# Patient Record
Sex: FEMALE | Race: WHITE | Employment: PART TIME | URBAN - METROPOLITAN AREA
[De-identification: names, ages, dates, MRNs, and addresses within clinical notes are randomized per-mention and may not be internally consistent; named-entity substitution may affect disease eponyms.]

---

## 2017-01-29 ENCOUNTER — APPOINTMENT (OUTPATIENT)
Dept: GENERAL RADIOLOGY | Age: 62
End: 2017-01-29
Attending: EMERGENCY MEDICINE
Payer: MEDICAID

## 2017-01-29 ENCOUNTER — HOSPITAL ENCOUNTER (EMERGENCY)
Age: 62
Discharge: HOME | End: 2017-01-29
Attending: EMERGENCY MEDICINE
Payer: MEDICAID

## 2017-01-29 VITALS
HEART RATE: 77 BPM | TEMPERATURE: 97.8 F | OXYGEN SATURATION: 96 % | BODY MASS INDEX: 19.37 KG/M2 | WEIGHT: 120 LBS | DIASTOLIC BLOOD PRESSURE: 65 MMHG | RESPIRATION RATE: 13 BRPM | SYSTOLIC BLOOD PRESSURE: 124 MMHG

## 2017-01-29 DIAGNOSIS — T78.40XA ALLERGIC REACTION, INITIAL ENCOUNTER: Primary | ICD-10-CM

## 2017-01-29 DIAGNOSIS — R07.9 CHEST PAIN, UNSPECIFIED TYPE: ICD-10-CM

## 2017-01-29 LAB
ANION GAP BLD CALC-SCNC: 9 MMOL/L (ref 3–18)
BASOPHILS # BLD AUTO: 0 K/UL (ref 0–0.06)
BASOPHILS # BLD: 0 % (ref 0–2)
BUN SERPL-MCNC: 20 MG/DL (ref 7–18)
BUN/CREAT SERPL: 20 (ref 12–20)
CALCIUM SERPL-MCNC: 9.2 MG/DL (ref 8.5–10.1)
CHLORIDE SERPL-SCNC: 102 MMOL/L (ref 100–108)
CK MB CFR SERPL CALC: 1.3 % (ref 0–4)
CK MB CFR SERPL CALC: 1.3 % (ref 0–4)
CK MB SERPL-MCNC: 0.7 NG/ML (ref 0.5–3.6)
CK MB SERPL-MCNC: 0.9 NG/ML (ref 0.5–3.6)
CK SERPL-CCNC: 55 U/L (ref 26–192)
CK SERPL-CCNC: 71 U/L (ref 26–192)
CO2 SERPL-SCNC: 26 MMOL/L (ref 21–32)
CREAT SERPL-MCNC: 1.01 MG/DL (ref 0.6–1.3)
DIFFERENTIAL METHOD BLD: ABNORMAL
EOSINOPHIL # BLD: 0.1 K/UL (ref 0–0.4)
EOSINOPHIL NFR BLD: 2 % (ref 0–5)
ERYTHROCYTE [DISTWIDTH] IN BLOOD BY AUTOMATED COUNT: 13.2 % (ref 11.6–14.5)
GLUCOSE SERPL-MCNC: 181 MG/DL (ref 74–99)
HCT VFR BLD AUTO: 46.5 % (ref 35–45)
HGB BLD-MCNC: 15.5 G/DL (ref 12–16)
LYMPHOCYTES # BLD AUTO: 42 % (ref 21–52)
LYMPHOCYTES # BLD: 2.9 K/UL (ref 0.9–3.6)
MCH RBC QN AUTO: 30.5 PG (ref 24–34)
MCHC RBC AUTO-ENTMCNC: 33.3 G/DL (ref 31–37)
MCV RBC AUTO: 91.4 FL (ref 74–97)
MONOCYTES # BLD: 0.4 K/UL (ref 0.05–1.2)
MONOCYTES NFR BLD AUTO: 5 % (ref 3–10)
NEUTS SEG # BLD: 3.6 K/UL (ref 1.8–8)
NEUTS SEG NFR BLD AUTO: 51 % (ref 40–73)
PLATELET # BLD AUTO: 306 K/UL (ref 135–420)
PMV BLD AUTO: 11.2 FL (ref 9.2–11.8)
POTASSIUM SERPL-SCNC: 3.6 MMOL/L (ref 3.5–5.5)
RBC # BLD AUTO: 5.09 M/UL (ref 4.2–5.3)
SODIUM SERPL-SCNC: 137 MMOL/L (ref 136–145)
TROPONIN I SERPL-MCNC: 0.02 NG/ML (ref 0–0.06)
TROPONIN I SERPL-MCNC: <0.02 NG/ML (ref 0–0.06)
WBC # BLD AUTO: 7 K/UL (ref 4.6–13.2)

## 2017-01-29 PROCEDURE — 74011000250 HC RX REV CODE- 250: Performed by: EMERGENCY MEDICINE

## 2017-01-29 PROCEDURE — 99285 EMERGENCY DEPT VISIT HI MDM: CPT

## 2017-01-29 PROCEDURE — 80048 BASIC METABOLIC PNL TOTAL CA: CPT | Performed by: EMERGENCY MEDICINE

## 2017-01-29 PROCEDURE — 74011250636 HC RX REV CODE- 250/636: Performed by: EMERGENCY MEDICINE

## 2017-01-29 PROCEDURE — 94762 N-INVAS EAR/PLS OXIMTRY CONT: CPT

## 2017-01-29 PROCEDURE — 96374 THER/PROPH/DIAG INJ IV PUSH: CPT

## 2017-01-29 PROCEDURE — 85025 COMPLETE CBC W/AUTO DIFF WBC: CPT | Performed by: EMERGENCY MEDICINE

## 2017-01-29 PROCEDURE — 82550 ASSAY OF CK (CPK): CPT | Performed by: EMERGENCY MEDICINE

## 2017-01-29 PROCEDURE — 96361 HYDRATE IV INFUSION ADD-ON: CPT

## 2017-01-29 PROCEDURE — 96375 TX/PRO/DX INJ NEW DRUG ADDON: CPT

## 2017-01-29 PROCEDURE — 93005 ELECTROCARDIOGRAM TRACING: CPT

## 2017-01-29 PROCEDURE — 71010 XR CHEST PORT: CPT

## 2017-01-29 RX ORDER — FAMOTIDINE 20 MG/1
20 TABLET, FILM COATED ORAL DAILY
Qty: 10 TAB | Refills: 0 | Status: SHIPPED | OUTPATIENT
Start: 2017-01-29 | End: 2017-02-08

## 2017-01-29 RX ORDER — ONDANSETRON 4 MG/1
4 TABLET, ORALLY DISINTEGRATING ORAL
Qty: 14 TAB | Refills: 0 | Status: SHIPPED | OUTPATIENT
Start: 2017-01-29 | End: 2017-03-09

## 2017-01-29 RX ORDER — ONDANSETRON 2 MG/ML
4 INJECTION INTRAMUSCULAR; INTRAVENOUS
Status: COMPLETED | OUTPATIENT
Start: 2017-01-29 | End: 2017-01-29

## 2017-01-29 RX ORDER — FAMOTIDINE 10 MG/ML
20 INJECTION INTRAVENOUS
Status: COMPLETED | OUTPATIENT
Start: 2017-01-29 | End: 2017-01-29

## 2017-01-29 RX ORDER — PREDNISONE 20 MG/1
60 TABLET ORAL DAILY
Qty: 12 TAB | Refills: 0 | Status: SHIPPED | OUTPATIENT
Start: 2017-01-29 | End: 2017-02-02

## 2017-01-29 RX ORDER — SODIUM CHLORIDE 9 MG/ML
1000 INJECTION, SOLUTION INTRAVENOUS ONCE
Status: COMPLETED | OUTPATIENT
Start: 2017-01-29 | End: 2017-01-29

## 2017-01-29 RX ADMIN — SODIUM CHLORIDE 1000 ML: 900 INJECTION, SOLUTION INTRAVENOUS at 19:53

## 2017-01-29 RX ADMIN — FAMOTIDINE 20 MG: 10 INJECTION, SOLUTION INTRAVENOUS at 19:53

## 2017-01-29 RX ADMIN — METHYLPREDNISOLONE SODIUM SUCCINATE 125 MG: 125 INJECTION, POWDER, FOR SOLUTION INTRAMUSCULAR; INTRAVENOUS at 19:54

## 2017-01-29 RX ADMIN — ONDANSETRON 4 MG: 2 INJECTION INTRAMUSCULAR; INTRAVENOUS at 19:54

## 2017-01-30 LAB
ATRIAL RATE: 73 BPM
CALCULATED P AXIS, ECG09: 60 DEGREES
CALCULATED R AXIS, ECG10: 80 DEGREES
CALCULATED T AXIS, ECG11: 63 DEGREES
DIAGNOSIS, 93000: NORMAL
P-R INTERVAL, ECG05: 142 MS
Q-T INTERVAL, ECG07: 394 MS
QRS DURATION, ECG06: 80 MS
QTC CALCULATION (BEZET), ECG08: 434 MS
VENTRICULAR RATE, ECG03: 73 BPM

## 2017-01-30 NOTE — DISCHARGE INSTRUCTIONS
Return for pain, any fever, any difficulty swallowing, shortness of breath, vomiting, decreased fluid intake, weakness, numbness, dizziness, or any change or concerns. Take benadryl over the counter as directed for the next 24 hours as we discussed, as needed afterwards. Allergic Reaction: Care Instructions  Your Care Instructions  An allergic reaction is an excessive response from your immune system to a medicine, chemical, food, insect bite, or other substance. A reaction can range from mild to life-threatening. Some people have a mild rash, hives, and itching or stomach cramps. In severe reactions, swelling of your tongue and throat can close up your airway so that you cannot breathe. Follow-up care is a key part of your treatment and safety. Be sure to make and go to all appointments, and call your doctor if you are having problems. It's also a good idea to know your test results and keep a list of the medicines you take. How can you care for yourself at home? · If you know what caused your allergic reaction, be sure to avoid it. Your allergy may become more severe each time you have a reaction. · Take an over-the-counter antihistamine, such as cetirizine (Zyrtec) or loratadine (Claritin), to treat mild symptoms. Read and follow directions on the label. Some antihistamines can make you feel sleepy. Do not give antihistamines to a child unless you have checked with your doctor first. Mild symptoms include sneezing or an itchy or runny nose; an itchy mouth; a few hives or mild itching; and mild nausea or stomach discomfort. · Do not scratch hives or a rash. Put a cold, moist towel on them or take cool baths to relieve itching. Put ice packs on hives, swelling, or insect stings for 10 to 15 minutes at a time. Put a thin cloth between the ice pack and your skin. Do not take hot baths or showers. They will make the itching worse.   · Your doctor may prescribe a shot of epinephrine to carry with you in case you have a severe reaction. Learn how to give yourself the shot and keep it with you at all times. Make sure it is not . · Go to the emergency room every time you have a severe reaction, even if you have used your shot of epinephrine and are feeling better. Symptoms can come back after a shot. · Wear medical alert jewelry that lists your allergies. You can buy this at most drugstores. · If your child has a severe allergy, make sure that his or her teachers, babysitters, coaches, and other caregivers know about the allergy. They should have an epinephrine shot, know how and when to give it, and have a plan to take your child to the hospital.  When should you call for help? Give an epinephrine shot if:  · You think you are having a severe allergic reaction. · You have symptoms in more than one body area, such as mild nausea and an itchy mouth. After giving an epinephrine shot call 911, even if you feel better. Call 911 if:  · You have symptoms of a severe allergic reaction. These may include:  ¨ Sudden raised, red areas (hives) all over your body. ¨ Swelling of the throat, mouth, lips, or tongue. ¨ Trouble breathing. ¨ Passing out (losing consciousness). Or you may feel very lightheaded or suddenly feel weak, confused, or restless. · You have been given an epinephrine shot, even if you feel better. Call your doctor now or seek immediate medical care if:  · You have symptoms of an allergic reaction, such as:  ¨ A rash or hives (raised, red areas on the skin). ¨ Itching. ¨ Swelling. ¨ Belly pain, nausea, or vomiting. Watch closely for changes in your health, and be sure to contact your doctor if:  · You do not get better as expected. Where can you learn more? Go to http://carolyne-fabiana.info/. Enter W675 in the search box to learn more about \"Allergic Reaction: Care Instructions. \"  Current as of: 2016  Content Version: 11.1  © 6905-5262 Healthwise, Incorporated. Care instructions adapted under license by [employer] (which disclaims liability or warranty for this information). If you have questions about a medical condition or this instruction, always ask your healthcare professional. [address] any warranty or liability for your use of this information. Chest Pain: Care Instructions  Your Care Instructions  There are many things that can cause chest pain. Some are not serious and will get better on their own in a few days. But some kinds of chest pain need more testing and treatment. Your doctor may have recommended a follow-up visit in the next 8 to 12 hours. If you are not getting better, you may need more tests or treatment. Even though your doctor has released you, you still need to watch for any problems. The doctor carefully checked you, but sometimes problems can develop later. If you have new symptoms or if your symptoms do not get better, get medical care right away. If you have worse or different chest pain or pressure that lasts more than 5 minutes or you passed out (lost consciousness), call 911 or seek other emergency help right away. A medical visit is only one step in your treatment. Even if you feel better, you still need to do what your doctor recommends, such as going to all suggested follow-up appointments and taking medicines exactly as directed. This will help you recover and help prevent future problems. How can you care for yourself at home? · Rest until you feel better. · Take your medicine exactly as prescribed. Call your doctor if you think you are having a problem with your medicine. · Do not drive after taking a prescription pain medicine. When should you call for help? Call 911 if:  · You passed out (lost consciousness). · You have severe difficulty breathing. · You have symptoms of a heart attack.  These may include:  ¨ Chest pain or pressure, or a strange feeling in your chest.  ¨ Sweating. ¨ Shortness of breath. ¨ Nausea or vomiting. ¨ Pain, pressure, or a strange feeling in your back, neck, jaw, or upper belly or in one or both shoulders or arms. ¨ Lightheadedness or sudden weakness. ¨ A fast or irregular heartbeat. After you call 911, the  may tell you to chew 1 adult-strength or 2 to 4 low-dose aspirin. Wait for an ambulance. Do not try to drive yourself. Call your doctor today if:  · You have any trouble breathing. · Your chest pain gets worse. · You are dizzy or lightheaded, or you feel like you may faint. · You are not getting better as expected. · You are having new or different chest pain. Where can you learn more? Go to http://carolyne-fabiana.info/. Enter A120 in the search box to learn more about \"Chest Pain: Care Instructions. \"  Current as of: May 27, 2016  Content Version: 11.1  © 9958-8865 Healthwise, Incorporated. Care instructions adapted under license by [employer] (which disclaims liability or warranty for this information). If you have questions about a medical condition or this instruction, always ask your healthcare professional. [place] [address] any warranty or liability for your use of this information.

## 2017-01-30 NOTE — ED NOTES
Patient resting well. No complaints at this time. Patient provided with additional warm blanket for comfort. Will continue to monitor.

## 2017-01-30 NOTE — ED PROVIDER NOTES
HPI Comments: 7:40 PM Caitlin Shen is a 64 y.o. female with a h/o HTN, and alcoholism presents to the ED with c/o allergic reaction onset this evening. Pt states she has taken 2 Benadryl tablets PTA. Pt reports hives, mild increasing chest tightness, itching to her arms, abd, and feet. Pt denies any new medication, trouble swallowing, SOB, abd pain, nausea, vomiting, diarrhea, chest pain, or cough. Pt noted that she takes Lisinopril. All other sx denied. No other complaints at this time. Deane Gottron, MD      Patient is a 64 y.o. female presenting with allergic reaction. The history is provided by the patient. Allergic Reaction    Pertinent negatives include no nausea, no vomiting and no shortness of breath. Past Medical History:   Diagnosis Date    Depressive disorder, not elsewhere classified     Esophagitis, unspecified     Essential hypertension, benign     Generalized osteoarthrosis, involving multiple sites     Mixed hyperlipidemia     Personal history of alcoholism ([facility] [address]        Past Surgical History:   Procedure Laterality Date    Hx cholecystectomy      Hx tubal ligation Bilateral     Hx tonsil and adenoidectomy      Hx colonoscopy  9/2010     (Jaun Lesches) diverticuli         Family History:   Problem Relation Age of Onset    Arthritis-osteo Mother     Ulcerative Colitis Mother     Breast Cancer Mother     Heart Disease Father        Social History     Social History    Marital status:      Spouse name: N/A    Number of children: N/A    Years of education: N/A     Occupational History    Not on file. Social History Main Topics    Smoking status: Current Every Day Smoker    Smokeless tobacco: Not on file    Alcohol use No    Drug use: No    Sexual activity: Not on file     Other Topics Concern    Not on file     Social History Narrative         ALLERGIES: Celebrex [celecoxib]    Review of Systems   Constitutional: Negative for fever.    HENT: Negative for congestion and trouble swallowing. Respiratory: Positive for chest tightness. Negative for cough and shortness of breath. Cardiovascular: Negative for chest pain. Gastrointestinal: Negative for abdominal pain, diarrhea, nausea and vomiting. Musculoskeletal: Negative for back pain. Skin: Positive for rash. Neurological: Negative for light-headedness. All other systems reviewed and are negative. Vitals:    01/29/17 2145 01/29/17 2200 01/29/17 2245 01/29/17 2255   BP: 121/59 114/62 124/65    Pulse: 81 84 79 77   Resp: 17 17 19 13   Temp:       SpO2: 93% 93% 93% 96%   Weight:                Physical Exam   Constitutional: She is oriented to person, place, and time. She appears well-developed. HENT:   Head: Normocephalic. Mouth/Throat: Oropharynx is clear and moist.   Eyes: Pupils are equal, round, and reactive to light. Neck: Normal range of motion. Cardiovascular: Normal rate and normal heart sounds. No murmur heard. Pulmonary/Chest: Effort normal. She has no wheezes. She has no rales. Abdominal: Soft. There is no tenderness. Musculoskeletal: Normal range of motion. She exhibits no edema. Neurological: She is alert and oriented to person, place, and time. Skin: Skin is warm and dry. Rash noted. Blanching erythematous rash scattered to torso and extremities. No petechiae, no warmth, and no induration. Nursing note and vitals reviewed. [facility]  ED Course       Procedures  Vitals:  No data found.         Medications ordered:   Medications   0.9% sodium chloride infusion 1,000 mL (0 mL IntraVENous IV Completed 1/29/17 2113)   ondansetron (ZOFRAN) injection 4 mg (4 mg IntraVENous Given 1/29/17 1954)   methylPREDNISolone (PF) (SOLU-MEDROL) injection 125 mg (125 mg IntraVENous Given 1/29/17 1954)   famotidine (PF) (PEPCID) injection 20 mg (20 mg IntraVENous Given 1/29/17 1953)         Lab findings:  No results found for this or any previous visit (from the past 15 hour(s)). X-Ray, CT or other radiology findings or impressions:  XR CHEST PORT   Final Result          Progress notes, Consult notes or additional Procedure notes:   8:05 PM  Pt is feeling better. Decreased rash on the chin.     8:52 PM  Sx's resolved. Pt feels much better. No emc, stable for dc and close f/u. Det ret inst given. Disposition:  Diagnosis:   1. Allergic reaction, initial encounter    2. Chest pain, unspecified type        Disposition: discharge    Follow-up Information     Follow up With Details Comments Kaushik French MD Schedule an appointment as soon as possible for a visit in 1 day  [address] [place] [address] [facility] ([place], [place]) [phone]             Discharge Medication List as of 1/29/2017 11:18 PM      START taking these medications    Details   ondansetron (ZOFRAN ODT) 4 mg disintegrating tablet Take 1 Tab by mouth every eight (8) hours as needed for Nausea. , Print, Disp-14 Tab, R-0      famotidine (PEPCID) 20 mg tablet Take 1 Tab by mouth daily for 10 days. , Print, Disp-10 Tab, R-0      predniSONE (DELTASONE) 20 mg tablet Take 3 Tabs by mouth daily for 4 days. , Print, Disp-12 Tab, R-0         CONTINUE these medications which have NOT CHANGED    Details   DIPHENHYDRAMINE HCL (BENADRYL PO) Take  by mouth., Historical Med      IBUPROFEN (MOTRIN PO) Take  by mouth., Historical Med      lisinopril-hydrochlorothiazide (PRINZIDE, ZESTORETIC) 20-12.5 mg per tablet 1 qd, Normal, Disp-90 Tab, R-3      Omeprazole delayed release (PRILOSEC D/R) 20 mg tablet Take 20 mg by mouth daily as needed., Historical Med      traMADol (ULTRAM) 50 mg tablet 1 or 2 po tid prn pain, Print, Disp-30 Tab, R-1           Scribe Attestation  Jan Awad scribing for and in the presence of Aissatou Guardado MD 7:39 PM, 02/01/17.     Physician Attestation  I personally performed the services described in the documentation, reviewed the documentation, as recorded by the scribe in my presence, and it accurately and completely records my words and actions.     Bhanu Hu MD 7:39 PM 02/01/17        Signed by : Sheree Marsh, 02/01/17 at 7:39 PM

## 2017-01-30 NOTE — ED NOTES
Patient c/o itching and shaking PTA. Patient reports that she has not started any new medication, and does not know of anything she was exposed to that she could have had an allergic reaction to. Patient reports that she took a benadryl PTA without relief. Patient understands plan of care. Will continue to monitor.

## 2017-01-30 NOTE — ED NOTES
Patient states that itching and shaking has resolved. Patient reports that she \"feels much better\". Patient understands plan of care. Will continue to monitor.

## 2017-03-09 ENCOUNTER — OFFICE VISIT (OUTPATIENT)
Dept: INTERNAL MEDICINE CLINIC | Age: 62
End: 2017-03-09

## 2017-03-09 VITALS
BODY MASS INDEX: 20.25 KG/M2 | WEIGHT: 126 LBS | RESPIRATION RATE: 16 BRPM | DIASTOLIC BLOOD PRESSURE: 80 MMHG | HEART RATE: 71 BPM | SYSTOLIC BLOOD PRESSURE: 130 MMHG | HEIGHT: 66 IN | OXYGEN SATURATION: 99 % | TEMPERATURE: 98 F

## 2017-03-09 DIAGNOSIS — I10 ESSENTIAL HYPERTENSION, BENIGN: Primary | ICD-10-CM

## 2017-03-09 DIAGNOSIS — E78.2 MIXED HYPERLIPIDEMIA: ICD-10-CM

## 2017-03-09 RX ORDER — LISINOPRIL AND HYDROCHLOROTHIAZIDE 12.5; 2 MG/1; MG/1
TABLET ORAL
Qty: 90 TAB | Refills: 3 | Status: SHIPPED | OUTPATIENT
Start: 2017-03-09 | End: 2019-03-26 | Stop reason: SDUPTHER

## 2017-03-09 NOTE — MR AVS SNAPSHOT
Visit Information Date & Time Provider Department Dept. Phone Encounter #  
 3/9/2017  9:00 AM Maria Victoria Coles MD [facility] [place] [phone] [id number] Follow-up Instructions Return if symptoms worsen or fail to improve. Follow-up and Disposition History Your Appointments 9/8/2017  9:00 AM  
Follow Up with Maria Victoria Coles MD  
[facility] [place] [facility] Appt Note: 6 mo f/u  
 [address], [address]  
  
   
 [address], [address] [place] [address] Upcoming Health Maintenance Date Due Hepatitis C Screening 1955 Pneumococcal 19-64 Medium Risk (1 of 1 - PPSV23) 4/15/1974 FOBT Q 1 YEAR AGE 50-75 4/15/2005 DTaP/Tdap/Td series (1 - Tdap) 8/16/2006 PAP AKA CERVICAL CYTOLOGY 8/26/2014 ZOSTER VACCINE AGE 60> 4/15/2015 BREAST CANCER SCRN MAMMOGRAM 12/29/2018 Allergies as of 3/9/2017  Review Complete On: 3/9/2017 By: Maria Victoria Coles MD  
  
 Severity Noted Reaction Type Reactions Celebrex [Celecoxib]    Rash Current Immunizations  Never Reviewed Name Date Influenza Vaccine (Quad) PF 9/9/2016 Td 8/15/2006 Not reviewed this visit You Were Diagnosed With   
  
 Codes Comments Essential hypertension, benign    -  Primary ICD-10-CM: I10 
ICD-9-CM: 401.1 Mixed hyperlipidemia     ICD-10-CM: E78.2 ICD-9-CM: 272.2 Vitals BP Pulse Temp Resp Height(growth percentile) Weight(growth percentile) 130/80 (BP 1 Location: Left arm, BP Patient Position: Sitting) 71 98 °F (36.7 °C) (Tympanic) 16 5' 6\" (1.676 m) 126 lb (57.2 kg) SpO2 BMI OB Status Smoking Status 99% 20.34 kg/m2 Postmenopausal Former Smoker Vitals History BMI and BSA Data Body Mass Index Body Surface Area  
 20.34 kg/m 2 1.63 m 2 Preferred Pharmacy Pharmacy Name Phone [facility] [address] [place], [place] [address] Your Updated Medication List  
  
   
This list is accurate as of: 3/9/17  9:21 AM.  Always use your most recent med list.  
  
  
  
  
 lisinopril-hydroCHLOROthiazide 20-12.5 mg per tablet Commonly known as:  PRINZIDE, ZESTORETIC  
1 qd MOTRIN PO Take  by mouth. Omeprazole delayed release 20 mg tablet Commonly known as:  PRILOSEC D/R Take 20 mg by mouth daily as needed. traMADol 50 mg tablet Commonly known as:  ULTRAM  
1 or 2 po tid prn pain Prescriptions Sent to Pharmacy Refills  
 lisinopril-hydroCHLOROthiazide (PRINZIDE, ZESTORETIC) 20-12.5 mg per tablet 3 Si qd Class: Normal  
 Pharmacy: [id number] [place] [address], [address] [place] [address] #: [phone] Follow-up Instructions Return if symptoms worsen or fail to improve. Patient Instructions High Cholesterol: Care Instructions Your Care Instructions Cholesterol is a type of fat in your blood. It is needed for many body functions, such as making new cells. Cholesterol is made by your body. It also comes from food you eat. High cholesterol means that you have too much of the fat in your blood. This raises your risk of a heart attack and stroke. LDL and HDL are part of your total cholesterol. LDL is the \"bad\" cholesterol. High LDL can raise your risk for heart disease, heart attack, and stroke. HDL is the \"good\" cholesterol. It helps clear bad cholesterol from the body. High HDL is linked with a lower risk of heart disease, heart attack, and stroke. Your cholesterol levels help your doctor find out your risk for having a heart attack or stroke. You and your doctor can talk about whether you need to lower your risk and what treatment is best for you.  
A heart-healthy lifestyle along with medicines can help lower your cholesterol and your risk. The way you choose to lower your risk will depend on how high your risk is for heart attack and stroke. It will also depend on how you feel about taking medicines. Follow-up care is a key part of your treatment and safety. Be sure to make and go to all appointments, and call your doctor if you are having problems. It's also a good idea to know your test results and keep a list of the medicines you take. How can you care for yourself at home? · Eat a variety of foods every day. Good choices include fruits, vegetables, whole grains (like oatmeal), dried beans and peas, nuts and seeds, soy products (like tofu), and fat-free or low-fat dairy products. · Replace butter, margarine, and hydrogenated or partially hydrogenated oils with olive and canola oils. (Canola oil margarine without trans fat is fine.) · Replace red meat with fish, poultry, and soy protein (like tofu). · Limit processed and packaged foods like chips, crackers, and cookies. · Bake, broil, or steam foods. Don't gil them. · Be physically active. Get at least 30 minutes of exercise on most days of the week. Walking is a good choice. You also may want to do other activities, such as running, swimming, cycling, or playing tennis or team sports. · Stay at a healthy weight or lose weight by making the changes in eating and physical activity listed above. Losing just a small amount of weight, even 5 to 10 pounds, can reduce your risk for having a heart attack or stroke. · Do not smoke. When should you call for help? Watch closely for changes in your health, and be sure to contact your doctor if: 
· You need help making lifestyle changes. · You have questions about your medicine. Where can you learn more? Go to http://carolyne-fabiana.info/. Enter C241 in the search box to learn more about \"High Cholesterol: Care Instructions. \" Current as of: January 27, 2016 Content Version: 11.1 © 6279-6655 Healthwise, Incorporated. Care instructions adapted under license by [employer] (which disclaims liability or warranty for this information). If you have questions about a medical condition or this instruction, always ask your healthcare professional. [address] any warranty or liability for your use of this information. Introducing [facility]! Manjula Conde introduces [employer] patient portal. Now you can access parts of your medical record, email your doctor's office, and request medication refills online. 1. In your internet browser, go to [email or web address] 2. Click on the First Time User? Click Here link in the Sign In box. You will see the New Member Sign Up page. 3. Enter your [employer] Access Code exactly as it appears below. You will not need to use this code after youve completed the sign-up process. If you do not sign up before the expiration date, you must request a new code. · [employer] Access Code: [email or web address] Expires: 3/19/2017  3:53 PM 
 
4. Enter the last four digits of your Social Security Number (xxxx) and Date of Birth (mm/dd/yyyy) as indicated and click Submit. You will be taken to the next sign-up page. 5. Create a [employer] ID. This will be your [employer] login ID and cannot be changed, so think of one that is secure and easy to remember. 6. Create a [employer] password. You can change your password at any time. 7. Enter your Password Reset Question and Answer. This can be used at a later time if you forget your password. 8. Enter your e-mail address. You will receive e-mail notification when new information is available in [address] 9. Click Sign Up. You can now view and download portions of your medical record. 10. Click the Download Summary menu link to download a portable copy of your medical information.  
 
If you have questions, please visit the Frequently Asked Questions section of the [employer]. Remember, [employer] is NOT to be used for urgent needs. For medical emergencies, dial 911. Now available from your iPhone and Android! Please provide this summary of care documentation to your next provider. Your primary care clinician is listed as Paulette Arias. If you have any questions after today's visit, please call [phone].

## 2017-03-09 NOTE — PROGRESS NOTES
HPI:   Routine f/u of HTN, hyperlipidemia (chol 248 in Sept 2016)  ER visit 1/2017 for hives; resolved in 24 hrs with benadryl (no obvious inciting cause)  w/o chest pain/abd. discomfort; no dyspnea, cough or pedal edema; denies constitutional complaints of fever, night sweats or wt loss; no evidence of GI/ hemorrhage; no polyuria/polydipsia. Activity is age appropriate. ROS is otherwise negative. Past Medical History:   Diagnosis Date    Depressive disorder, not elsewhere classified     Esophagitis, unspecified     Essential hypertension, benign     Generalized osteoarthrosis, involving multiple sites     Mixed hyperlipidemia     Personal history of alcoholism ([facility] [address]        Past Surgical History:   Procedure Laterality Date    HX CHOLECYSTECTOMY      HX COLONOSCOPY  9/2010    (Ariane Kumar) diverticuli    HX TONSIL AND ADENOIDECTOMY      HX TUBAL LIGATION Bilateral        Social History     Social History    Marital status:      Spouse name: N/A    Number of children: N/A    Years of education: N/A     Occupational History    Not on file. Social History Main Topics    Smoking status: Current Every Day Smoker    Smokeless tobacco: Not on file    Alcohol use No    Drug use: No    Sexual activity: Not on file     Other Topics Concern    Not on file     Social History Narrative       Allergies   Allergen Reactions    Celebrex [Celecoxib] Rash       Family History   Problem Relation Age of Onset    Arthritis-osteo Mother     Ulcerative Colitis Mother     Breast Cancer Mother     Heart Disease Father        Current Outpatient Prescriptions   Medication Sig Dispense Refill    IBUPROFEN (MOTRIN PO) Take  by mouth.  lisinopril-hydrochlorothiazide (PRINZIDE, ZESTORETIC) 20-12.5 mg per tablet 1 qd 90 Tab 3    Omeprazole delayed release (PRILOSEC D/R) 20 mg tablet Take 20 mg by mouth daily as needed.       traMADol (ULTRAM) 50 mg tablet 1 or 2 po tid prn pain 30 Tab 1 Visit Vitals    /80 (BP 1 Location: Left arm, BP Patient Position: Sitting)    Pulse 71    Temp 98 °F (36.7 °C) (Tympanic)    Resp 16    Ht 5' 6\" (1.676 m)    Wt 126 lb (57.2 kg)    SpO2 99%    BMI 20.34 kg/m2       PE  Well nourished in NAD  HEENT: PERRLA, EOMI;  OP: clear. Neck: supple w/o mass or bruits. Chest: clear. CV: RRR w/o m,r,g; pulses intact. Abd: soft, NT, w/o HSM or mass. Ext: w/o edema. Neuro: NF. Assessment and Plan    Encounter Diagnoses   Name Primary?     Essential hypertension, benign Yes    Mixed hyperlipidemia    HTN - controlled  Hyperlipidemia - continue dietary/exercise efforts  No change in rx  OV 6 mos or prn  I have explained plan to patient and the patient verbalizes understanding

## 2017-03-09 NOTE — PROGRESS NOTES
1. Have you been to the ER, urgent care clinic since your last visit? Hospitalized since your last visit? No    2. Have you seen or consulted any other health care providers outside of the [address] [place] [address] since your last visit? Include any pap smears or colon screening.  No

## 2017-03-09 NOTE — PATIENT INSTRUCTIONS

## 2017-03-17 ENCOUNTER — TELEPHONE (OUTPATIENT)
Dept: INTERNAL MEDICINE CLINIC | Age: 62
End: 2017-03-17

## 2017-03-20 NOTE — TELEPHONE ENCOUNTER
Returned call to patient and notified her that a bone scan is not recommended until age 72.  Patient voices understanding

## 2017-09-18 ENCOUNTER — OFFICE VISIT (OUTPATIENT)
Dept: INTERNAL MEDICINE CLINIC | Age: 62
End: 2017-09-18

## 2017-09-18 VITALS
WEIGHT: 125 LBS | BODY MASS INDEX: 24.54 KG/M2 | TEMPERATURE: 97.8 F | SYSTOLIC BLOOD PRESSURE: 124 MMHG | RESPIRATION RATE: 16 BRPM | HEIGHT: 60 IN | OXYGEN SATURATION: 96 % | HEART RATE: 75 BPM | DIASTOLIC BLOOD PRESSURE: 78 MMHG

## 2017-09-18 DIAGNOSIS — E78.2 MIXED HYPERLIPIDEMIA: ICD-10-CM

## 2017-09-18 DIAGNOSIS — Z23 ENCOUNTER FOR IMMUNIZATION: ICD-10-CM

## 2017-09-18 DIAGNOSIS — Z00.00 ROUTINE GENERAL MEDICAL EXAMINATION AT A HEALTH CARE FACILITY: Primary | ICD-10-CM

## 2017-09-18 DIAGNOSIS — I10 ESSENTIAL HYPERTENSION, BENIGN: ICD-10-CM

## 2017-09-18 NOTE — PROGRESS NOTES
HPI:   Routine check up  HX is notable for HTN, hyperlipidemia  w/o chest pain/abd. discomfort; no dyspnea, cough or pedal edema; denies constitutional complaints of fever, night sweats or wt loss; no evidence of GI/ hemorrhage; no polyuria/polydipsia. Activity is age appropriate. ROS is otherwise negative. Past Medical History:   Diagnosis Date    Depressive disorder, not elsewhere classified     Esophagitis, unspecified     Essential hypertension, benign     Generalized osteoarthrosis, involving multiple sites     Mixed hyperlipidemia     Personal history of alcoholism ([facility] [address]        Past Surgical History:   Procedure Laterality Date    HX CHOLECYSTECTOMY      HX COLONOSCOPY  9/2010    (Jesus Almonte) diverticuli    HX TONSIL AND ADENOIDECTOMY      HX TUBAL LIGATION Bilateral        Social History     Social History    Marital status:      Spouse name: N/A    Number of children: N/A    Years of education: N/A     Occupational History    Not on file. Social History Main Topics    Smoking status: Former Smoker    Smokeless tobacco: Former User     Quit date: 1/9/2016    Alcohol use No    Drug use: No    Sexual activity: Not on file     Other Topics Concern    Not on file     Social History Narrative       Allergies   Allergen Reactions    Celebrex [Celecoxib] Rash       Family History   Problem Relation Age of Onset    Arthritis-osteo Mother     Ulcerative Colitis Mother     Breast Cancer Mother     Heart Disease Father        Current Outpatient Prescriptions   Medication Sig Dispense Refill    lisinopril-hydroCHLOROthiazide (PRINZIDE, ZESTORETIC) 20-12.5 mg per tablet 1 qd 90 Tab 3    IBUPROFEN (MOTRIN PO) Take  by mouth.  Omeprazole delayed release (PRILOSEC D/R) 20 mg tablet Take 20 mg by mouth daily as needed.       traMADol (ULTRAM) 50 mg tablet 1 or 2 po tid prn pain 30 Tab 1           Visit Vitals    /78 (BP 1 Location: Right arm, BP Patient Position: Sitting)    Pulse 75    Temp 97.8 °F (36.6 °C) (Tympanic)    Resp 16    Ht 5' (1.524 m)    Wt 125 lb (56.7 kg)    SpO2 96%    BMI 24.41 kg/m2       PE  Well nourished in NAD  HEENT:  OP: clear. Neck: supple w/o mass or bruits. Chest: clear. CV: RRR w/o m,r,g; pulses intact. Abd: soft, NT, w/o HSM or mass. Ext: w/o edema. Neuro: NF. Assessment and Plan    Encounter Diagnoses   Name Primary?     Routine general medical examination at a health care facility Yes    Essential hypertension, benign     Mixed hyperlipidemia    HTN - controlled  Hyperlipidemia - labs soon to assess  Continue dietary/exercise efforts  Flu/Tdap given  Mammogram neg 12/2016; colo 9/2010 (diverticula)  No change in rx  OV 6 mos or prn  I have explained plan to patient and the patient verbalizes understanding

## 2017-09-18 NOTE — PATIENT INSTRUCTIONS

## 2017-09-21 LAB
BUN SERPL-MCNC: 15 MG/DL (ref 8–27)
BUN/CREAT SERPL: 24 (ref 12–28)
CALCIUM SERPL-MCNC: 9.2 MG/DL (ref 8.7–10.3)
CHLORIDE SERPL-SCNC: 101 MMOL/L (ref 96–106)
CHOLEST SERPL-MCNC: 224 MG/DL (ref 100–199)
CO2 SERPL-SCNC: 23 MMOL/L (ref 18–29)
CREAT SERPL-MCNC: 0.62 MG/DL (ref 0.57–1)
GLUCOSE SERPL-MCNC: 101 MG/DL (ref 65–99)
HDLC SERPL-MCNC: 43 MG/DL
LDLC SERPL CALC-MCNC: 160 MG/DL (ref 0–99)
POTASSIUM SERPL-SCNC: 4.2 MMOL/L (ref 3.5–5.2)
SODIUM SERPL-SCNC: 143 MMOL/L (ref 134–144)
TRIGL SERPL-MCNC: 106 MG/DL (ref 0–149)
VLDLC SERPL CALC-MCNC: 21 MG/DL (ref 5–40)

## 2018-01-23 ENCOUNTER — OFFICE VISIT (OUTPATIENT)
Dept: INTERNAL MEDICINE CLINIC | Age: 63
End: 2018-01-23

## 2018-01-23 VITALS
TEMPERATURE: 97.8 F | HEIGHT: 60 IN | OXYGEN SATURATION: 97 % | WEIGHT: 123 LBS | RESPIRATION RATE: 16 BRPM | DIASTOLIC BLOOD PRESSURE: 78 MMHG | HEART RATE: 70 BPM | SYSTOLIC BLOOD PRESSURE: 120 MMHG | BODY MASS INDEX: 24.15 KG/M2

## 2018-01-23 DIAGNOSIS — J06.9 ACUTE URI: Primary | ICD-10-CM

## 2018-01-23 DIAGNOSIS — I10 ESSENTIAL HYPERTENSION, BENIGN: ICD-10-CM

## 2018-01-23 RX ORDER — AMOXICILLIN 500 MG/1
CAPSULE ORAL
Qty: 30 CAP | Refills: 0 | Status: SHIPPED | OUTPATIENT
Start: 2018-01-23 | End: 2018-11-08

## 2018-01-23 NOTE — PROGRESS NOTES
1. Have you been to the ER, urgent care clinic since your last visit? Hospitalized since your last visit? No    2. Have you seen or consulted any other health care providers outside of the [address] [place] [address] since your last visit? Include any pap smears or colon screening.  No

## 2018-01-23 NOTE — PATIENT INSTRUCTIONS

## 2018-01-23 NOTE — PROGRESS NOTES
HPI:   14 days of head congestion, facial discomfort, NP cough w/o fever, dyspnea, CP, or N  Hx is notable for HTN    ROS is otherwise negative. Past Medical History:   Diagnosis Date    Depressive disorder, not elsewhere classified     Esophagitis, unspecified     Essential hypertension, benign     Generalized osteoarthrosis, involving multiple sites     Mixed hyperlipidemia     Personal history of alcoholism ([facility] [address]        Past Surgical History:   Procedure Laterality Date    HX CHOLECYSTECTOMY      HX COLONOSCOPY  9/2010    (Laloamisha Rico) diverticuli    HX TONSIL AND ADENOIDECTOMY      HX TUBAL LIGATION Bilateral        Social History     Social History    Marital status:      Spouse name: N/A    Number of children: N/A    Years of education: N/A     Occupational History    Not on file. Social History Main Topics    Smoking status: Former Smoker    Smokeless tobacco: Former User     Quit date: 1/9/2016    Alcohol use No    Drug use: No    Sexual activity: Not on file     Other Topics Concern    Not on file     Social History Narrative       Allergies   Allergen Reactions    Celebrex [Celecoxib] Rash       Family History   Problem Relation Age of Onset    Arthritis-osteo Mother     Ulcerative Colitis Mother     Breast Cancer Mother     Heart Disease Father        Current Outpatient Prescriptions   Medication Sig Dispense Refill    lisinopril-hydroCHLOROthiazide (PRINZIDE, ZESTORETIC) 20-12.5 mg per tablet 1 qd 90 Tab 3    Omeprazole delayed release (PRILOSEC D/R) 20 mg tablet Take 20 mg by mouth daily as needed. Visit Vitals    /78 (BP 1 Location: Right arm, BP Patient Position: Sitting)    Pulse 70    Temp 97.8 °F (36.6 °C) (Tympanic)    Resp 16    Ht 5' (1.524 m)    Wt 123 lb (55.8 kg)    SpO2 97%    BMI 24.02 kg/m2       PE  Well nourished in NAD  HEENT:  OP: clear. TMS: clear  Neck: supple w/o mass or bruits. Chest: clear.   CV: RRR w/o m,r,g; pulses intact. Abd: soft, NT, w/o HSM or mass. Ext: w/o edema. Neuro: NF. Assessment and Plan    Encounter Diagnoses   Name Primary?     Acute URI Yes    Essential hypertension, benign    amoxicillin 500 mg tid for 10 days  Claritin/robitussin DM prn  F/U worsening or unimproved 7 days  HTN - controlled  OV 6 mos or prn  I have explained plan to patient and the patient verbalizes understanding

## 2018-02-15 ENCOUNTER — HOSPITAL ENCOUNTER (OUTPATIENT)
Dept: MAMMOGRAPHY | Age: 63
Discharge: HOME | End: 2018-02-15
Attending: INTERNAL MEDICINE
Payer: COMMERCIAL

## 2018-02-15 DIAGNOSIS — Z12.31 VISIT FOR SCREENING MAMMOGRAM: ICD-10-CM

## 2018-02-15 PROCEDURE — 77063 BREAST TOMOSYNTHESIS BI: CPT

## 2018-11-07 ENCOUNTER — DOCUMENTATION ONLY (OUTPATIENT)
Dept: INTERNAL MEDICINE CLINIC | Age: 63
End: 2018-11-07

## 2018-11-07 NOTE — PROGRESS NOTES
Pharmacy Note: Immunization Update    Patient: Kylie Crowley (2 y.o., 1955)     Patient's immunization history was updated to reflect information contained in the [place] and/or outside immunization/pharmacy records were reconciled within [facility]. Health Maintenance schedule updated when appropriate.     Current immunizations now reflect:       Immunization History   Administered Date(s) Administered    Influenza Vaccine (Quad) PF 09/09/2016, 09/18/2017    Td 08/15/2006    Tdap 09/18/2017    Zoster Vaccine, Live 10/02/2017       Annabella Covington, PharmD, BCACP

## 2018-11-08 ENCOUNTER — OFFICE VISIT (OUTPATIENT)
Dept: INTERNAL MEDICINE CLINIC | Age: 63
End: 2018-11-08

## 2018-11-08 VITALS
TEMPERATURE: 98.4 F | BODY MASS INDEX: 23.95 KG/M2 | OXYGEN SATURATION: 98 % | HEART RATE: 79 BPM | DIASTOLIC BLOOD PRESSURE: 72 MMHG | SYSTOLIC BLOOD PRESSURE: 122 MMHG | HEIGHT: 60 IN | WEIGHT: 122 LBS

## 2018-11-08 DIAGNOSIS — I10 ESSENTIAL HYPERTENSION, BENIGN: ICD-10-CM

## 2018-11-08 DIAGNOSIS — M25.552 PAIN OF BOTH HIP JOINTS: ICD-10-CM

## 2018-11-08 DIAGNOSIS — G89.29 CHRONIC BILATERAL LOW BACK PAIN WITHOUT SCIATICA: Primary | ICD-10-CM

## 2018-11-08 DIAGNOSIS — M54.50 CHRONIC BILATERAL LOW BACK PAIN WITHOUT SCIATICA: Primary | ICD-10-CM

## 2018-11-08 DIAGNOSIS — Z23 ENCOUNTER FOR IMMUNIZATION: ICD-10-CM

## 2018-11-08 DIAGNOSIS — M25.551 PAIN OF BOTH HIP JOINTS: ICD-10-CM

## 2018-11-08 RX ORDER — BUDESONIDE 3 MG/1
3 CAPSULE, COATED PELLETS ORAL
COMMUNITY
End: 2019-10-08 | Stop reason: ALTCHOICE

## 2018-11-08 RX ORDER — GABAPENTIN 300 MG/1
CAPSULE ORAL
Qty: 30 CAP | Refills: 5 | Status: SHIPPED | OUTPATIENT
Start: 2018-11-08 | End: 2019-03-26 | Stop reason: SDUPTHER

## 2018-11-08 NOTE — PROGRESS NOTES
No chief complaint on file. Depression Screening:  No flowsheet data found. Learning Assessment:  Learning Assessment 9/24/2015   PRIMARY LEARNER Patient   HIGHEST LEVEL OF EDUCATION - PRIMARY LEARNER  SOME COLLEGE   BARRIERS PRIMARY LEARNER NONE   CO-LEARNER CAREGIVER No   PRIMARY LANGUAGE ENGLISH   LEARNER PREFERENCE PRIMARY DEMONSTRATION   ANSWERED BY patient   RELATIONSHIP SELF       Abuse Screening:  Abuse Screening Questionnaire 9/18/2017   Do you ever feel afraid of your partner? N   Are you in a relationship with someone who physically or mentally threatens you? N   Is it safe for you to go home? Y       Fall Risk  Fall Risk Assessment, last 12 mths 9/18/2017   Able to walk? Yes   Fall in past 12 months? No           1. Have you been to the ER, urgent care clinic since your last visit? Hospitalized since your last visit? No    2. Have you seen or consulted any other health care providers outside of the [address] [place] [address] since your last visit? Include any pap smears or colon screening.  No

## 2018-11-08 NOTE — PROGRESS NOTES
HPI:   Pt with HTN presents post eval by ortho to discuss having months of intermittent (B) hip pain with mild back pain; no radiculopathy  Ortho did XRAYs showing mild OA changes of hips  Unable to sleep well d/t pain  Pain increased as began exercise program 4/2018 at [address] is otherwise negative.     Past Medical History:   Diagnosis Date    Depressive disorder, not elsewhere classified     Esophagitis, unspecified     Essential hypertension, benign     Generalized osteoarthrosis, involving multiple sites     Mixed hyperlipidemia     Personal history of [facility]ism ([facility] [address]        Past Surgical History:   Procedure Laterality Date    HX CHOLECYSTECTOMY      HX COLONOSCOPY  9/2010; 4/2018    (Jeanette Walters) diverticuli; 2nd colo showed polyp/lymphocytic colitis    HX TONSIL AND ADENOIDECTOMY      HX TUBAL LIGATION Bilateral        Social History     Socioeconomic History    Marital status:      Spouse name: Not on file    Number of children: Not on file    Years of education: Not on file    Highest education level: Not on file   Social Needs    Financial resource strain: Not on file    Food insecurity - worry: Not on file    Food insecurity - inability: Not on file   A4 Data needs - medical: Not on file   A4 Data needs - non-medical: Not on file   Occupational History    Not on file   Tobacco Use    Smoking status: Former Smoker    Smokeless tobacco: Former User     Quit date: 1/9/2016   Substance and Sexual Activity    Alcohol use: No     Alcohol/week: 0.0 oz    Drug use: No    Sexual activity: Not on file   Other Topics Concern    Not on file   Social History Narrative    Not on file       Allergies   Allergen Reactions    Celebrex [Celecoxib] Rash       Family History   Problem Relation Age of Onset    Arthritis-osteo Mother     Ulcerative Colitis Mother     Breast Cancer Mother     Heart Disease Father        Current Outpatient Medications   Medication Sig Dispense Refill    budesonide (ENTOCORT EC) 3 mg capsule Take 3 mg by mouth every morning.  lisinopril-hydroCHLOROthiazide (PRINZIDE, ZESTORETIC) 20-12.5 mg per tablet 1 qd 90 Tab 3    Omeprazole delayed release (PRILOSEC D/R) 20 mg tablet Take 20 mg by mouth daily as needed. Visit Vitals  /72 (BP 1 Location: Left arm, BP Patient Position: Sitting)   Pulse 79   Temp 98.4 °F (36.9 °C) (Tympanic)   Ht 5' (1.524 m)   Wt 122 lb (55.3 kg)   SpO2 98%   BMI 23.83 kg/m²       PE  Well nourished in NAD  HEENT:  OP: clear. Neck: supple w/o mass or bruits. Chest: clear. CV: RRR w/o m,r,g; pulses intact. Abd: soft, NT, w/o HSM or mass. Ext: w/o edema. MSK: no lumbar tenderness; neg SLR; FROM of hips with minimal pain  Neuro: NF. Assessment and Plan    Encounter Diagnoses   Name Primary?     Chronic bilateral low back pain without sciatica Yes    Pain of both hip joints     Essential hypertension, benign     Encounter for immunization        MSK  pain primarily hips- has recently seen ortho; advised conservative rx measures with prn OTC NSAIDS  With trouble sleeping - trial of neurontin 300 mg qhs  HTN - controlled  Flu vaccine given  OV 6 mos or prn  I have explained plan to patient and the patient verbalizes understanding

## 2018-11-08 NOTE — PATIENT INSTRUCTIONS
Hip Pain: Care Instructions  Your Care Instructions    Hip pain may be caused by many things, including overuse, a fall, or a twisting movement. Another cause of hip pain is arthritis. Your pain may increase when you stand up, walk, or squat. The pain may come and go or may be constant. Home treatment can help relieve hip pain, swelling, and stiffness. If your pain is ongoing, you may need more tests and treatment. Follow-up care is a key part of your treatment and safety. Be sure to make and go to all appointments, and call your doctor if you are having problems. It's also a good idea to know your test results and keep a list of the medicines you take. How can you care for yourself at home? · Take pain medicines exactly as directed. ? If the doctor gave you a prescription medicine for pain, take it as prescribed. ? If you are not taking a prescription pain medicine, ask your doctor if you can take an over-the-counter medicine. · Rest and protect your hip. Take a break from any activity, including standing or walking, that may cause pain. · Put ice or a cold pack against your hip for 10 to 20 minutes at a time. Try to do this every 1 to 2 hours for the next 3 days (when you are awake) or until the swelling goes down. Put a thin cloth between the ice and your skin. · Sleep on your healthy side with a pillow between your knees, or sleep on your back with pillows under your knees. · If there is no swelling, you can put moist heat, a heating pad, or a warm cloth on your hip. Do gentle stretching exercises to help keep your hip flexible. · Learn how to prevent falls. Have your vision and hearing checked regularly. Wear slippers or shoes with a nonskid sole. · Stay at a healthy weight. · Wear comfortable shoes. When should you call for help? Call 911 anytime you think you may need emergency care.  For example, call if:    · You have sudden chest pain and shortness of breath, or you cough up blood.     · You are not able to stand or walk or bear weight.     · Your buttocks, legs, or feet feel numb or tingly.     · Your leg or foot is cool or pale or changes color.     · You have severe pain.    Call your doctor now or seek immediate medical care if:    · You have signs of infection, such as:  ? Increased pain, swelling, warmth, or redness in the hip area. ? Red streaks leading from the hip area. ? Pus draining from the hip area. ? A fever.     · You have signs of a blood clot, such as:  ? Pain in your calf, back of the knee, thigh, or groin. ? Redness and swelling in your leg or groin.     · You are not able to bend, straighten, or move your leg normally.     · You have trouble urinating or having bowel movements.    Watch closely for changes in your health, and be sure to contact your doctor if:    · You do not get better as expected. Where can you learn more? Go to http://carolyne-fabiana.info/. Enter Z233 in the search box to learn more about \"Hip Pain: Care Instructions. \"  Current as of: November 20, 2017  Content Version: 11.8  © 7548-0639 [employer]. Care instructions adapted under license by [employer] (which disclaims liability or warranty for this information). If you have questions about a medical condition or this instruction, always ask your healthcare professional. [place] [address] any warranty or liability for your use of this information.

## 2019-03-26 ENCOUNTER — HOSPITAL ENCOUNTER (OUTPATIENT)
Dept: LAB | Age: 64
Discharge: HOME | End: 2019-03-26
Payer: COMMERCIAL

## 2019-03-26 ENCOUNTER — HOSPITAL ENCOUNTER (OUTPATIENT)
Dept: GENERAL RADIOLOGY | Age: 64
Discharge: HOME | End: 2019-03-26
Payer: COMMERCIAL

## 2019-03-26 ENCOUNTER — OFFICE VISIT (OUTPATIENT)
Dept: INTERNAL MEDICINE CLINIC | Age: 64
End: 2019-03-26

## 2019-03-26 VITALS
WEIGHT: 120 LBS | HEART RATE: 87 BPM | TEMPERATURE: 98.3 F | SYSTOLIC BLOOD PRESSURE: 140 MMHG | BODY MASS INDEX: 23.56 KG/M2 | DIASTOLIC BLOOD PRESSURE: 70 MMHG | RESPIRATION RATE: 16 BRPM | OXYGEN SATURATION: 97 % | HEIGHT: 60 IN

## 2019-03-26 DIAGNOSIS — M79.2 CHRONIC PERIPHERAL NEUROPATHIC PAIN: ICD-10-CM

## 2019-03-26 DIAGNOSIS — Z11.59 NEED FOR HEPATITIS C SCREENING TEST: ICD-10-CM

## 2019-03-26 DIAGNOSIS — G89.29 CHRONIC PERIPHERAL NEUROPATHIC PAIN: ICD-10-CM

## 2019-03-26 DIAGNOSIS — Z91.89 AT RISK FOR DECREASED BONE DENSITY: ICD-10-CM

## 2019-03-26 DIAGNOSIS — I10 ESSENTIAL HYPERTENSION, BENIGN: ICD-10-CM

## 2019-03-26 DIAGNOSIS — M15.9 GENERALIZED OSTEOARTHROSIS, INVOLVING MULTIPLE SITES: ICD-10-CM

## 2019-03-26 DIAGNOSIS — Z76.89 ENCOUNTER TO ESTABLISH CARE: ICD-10-CM

## 2019-03-26 DIAGNOSIS — Z00.00 ROUTINE GENERAL MEDICAL EXAMINATION AT HEALTH CARE FACILITY: ICD-10-CM

## 2019-03-26 DIAGNOSIS — Z12.39 SCREENING BREAST EXAMINATION: ICD-10-CM

## 2019-03-26 DIAGNOSIS — E78.2 MIXED HYPERLIPIDEMIA: Primary | ICD-10-CM

## 2019-03-26 DIAGNOSIS — E78.2 MIXED HYPERLIPIDEMIA: ICD-10-CM

## 2019-03-26 DIAGNOSIS — Z23 NEED FOR SHINGLES VACCINE: ICD-10-CM

## 2019-03-26 LAB
ALBUMIN SERPL-MCNC: 4.2 G/DL (ref 3.4–5)
ALBUMIN/GLOB SERPL: 1.1 {RATIO} (ref 0.8–1.7)
ALP SERPL-CCNC: 107 U/L (ref 45–117)
ALT SERPL-CCNC: 24 U/L (ref 13–56)
ANION GAP SERPL CALC-SCNC: 8 MMOL/L (ref 3–18)
APPEARANCE UR: CLEAR
AST SERPL-CCNC: 22 U/L (ref 15–37)
BACTERIA URNS QL MICRO: NEGATIVE /HPF
BASOPHILS # BLD: 0 K/UL (ref 0–0.1)
BASOPHILS NFR BLD: 0 % (ref 0–2)
BILIRUB SERPL-MCNC: 0.3 MG/DL (ref 0.2–1)
BILIRUB UR QL: NEGATIVE
BUN SERPL-MCNC: 15 MG/DL (ref 7–18)
BUN/CREAT SERPL: 22 (ref 12–20)
CALCIUM SERPL-MCNC: 8.8 MG/DL (ref 8.5–10.1)
CHLORIDE SERPL-SCNC: 104 MMOL/L (ref 100–108)
CHOLEST SERPL-MCNC: 230 MG/DL
CO2 SERPL-SCNC: 27 MMOL/L (ref 21–32)
COLOR UR: YELLOW
CREAT SERPL-MCNC: 0.68 MG/DL (ref 0.6–1.3)
DIFFERENTIAL METHOD BLD: ABNORMAL
EOSINOPHIL # BLD: 0.1 K/UL (ref 0–0.4)
EOSINOPHIL NFR BLD: 1 % (ref 0–5)
EPITH CASTS URNS QL MICRO: NORMAL /LPF (ref 0–5)
ERYTHROCYTE [DISTWIDTH] IN BLOOD BY AUTOMATED COUNT: 13.8 % (ref 11.6–14.5)
EST. AVERAGE GLUCOSE BLD GHB EST-MCNC: 128 MG/DL
GLOBULIN SER CALC-MCNC: 3.7 G/DL (ref 2–4)
GLUCOSE SERPL-MCNC: 92 MG/DL (ref 74–99)
GLUCOSE UR STRIP.AUTO-MCNC: NEGATIVE MG/DL
HBA1C MFR BLD: 6.1 % (ref 4.2–5.6)
HCT VFR BLD AUTO: 45.6 % (ref 35–45)
HDLC SERPL-MCNC: 42 MG/DL (ref 40–60)
HDLC SERPL: 5.5 {RATIO} (ref 0–5)
HGB BLD-MCNC: 15 G/DL (ref 12–16)
HGB UR QL STRIP: NEGATIVE
KETONES UR QL STRIP.AUTO: NEGATIVE MG/DL
LDLC SERPL CALC-MCNC: 156.8 MG/DL (ref 0–100)
LEUKOCYTE ESTERASE UR QL STRIP.AUTO: ABNORMAL
LIPID PROFILE,FLP: ABNORMAL
LYMPHOCYTES # BLD: 1.8 K/UL (ref 0.9–3.6)
LYMPHOCYTES NFR BLD: 24 % (ref 21–52)
MAGNESIUM SERPL-MCNC: 2 MG/DL (ref 1.6–2.6)
MCH RBC QN AUTO: 30.9 PG (ref 24–34)
MCHC RBC AUTO-ENTMCNC: 32.9 G/DL (ref 31–37)
MCV RBC AUTO: 93.8 FL (ref 74–97)
MONOCYTES # BLD: 0.3 K/UL (ref 0.05–1.2)
MONOCYTES NFR BLD: 4 % (ref 3–10)
NEUTS SEG # BLD: 5.6 K/UL (ref 1.8–8)
NEUTS SEG NFR BLD: 71 % (ref 40–73)
NITRITE UR QL STRIP.AUTO: NEGATIVE
PH UR STRIP: 6.5 [PH] (ref 5–8)
PLATELET # BLD AUTO: 324 K/UL (ref 135–420)
PMV BLD AUTO: 12 FL (ref 9.2–11.8)
POTASSIUM SERPL-SCNC: 4.2 MMOL/L (ref 3.5–5.5)
PROT SERPL-MCNC: 7.9 G/DL (ref 6.4–8.2)
PROT UR STRIP-MCNC: NEGATIVE MG/DL
RBC # BLD AUTO: 4.86 M/UL (ref 4.2–5.3)
RBC #/AREA URNS HPF: 0 /HPF (ref 0–5)
SODIUM SERPL-SCNC: 139 MMOL/L (ref 136–145)
SP GR UR REFRACTOMETRY: 1.01 (ref 1–1.03)
T4 FREE SERPL-MCNC: 1 NG/DL (ref 0.7–1.5)
TRIGL SERPL-MCNC: 156 MG/DL (ref ?–150)
TSH SERPL DL<=0.05 MIU/L-ACNC: 1.5 UIU/ML (ref 0.36–3.74)
UROBILINOGEN UR QL STRIP.AUTO: 0.2 EU/DL (ref 0.2–1)
VIT B12 SERPL-MCNC: 501 PG/ML (ref 211–911)
VLDLC SERPL CALC-MCNC: 31.2 MG/DL
WBC # BLD AUTO: 7.8 K/UL (ref 4.6–13.2)
WBC URNS QL MICRO: NORMAL /HPF (ref 0–4)

## 2019-03-26 PROCEDURE — 86617 LYME DISEASE ANTIBODY: CPT

## 2019-03-26 PROCEDURE — 84443 ASSAY THYROID STIM HORMONE: CPT

## 2019-03-26 PROCEDURE — 86038 ANTINUCLEAR ANTIBODIES: CPT

## 2019-03-26 PROCEDURE — 84165 PROTEIN E-PHORESIS SERUM: CPT

## 2019-03-26 PROCEDURE — 73502 X-RAY EXAM HIP UNI 2-3 VIEWS: CPT

## 2019-03-26 PROCEDURE — 82306 VITAMIN D 25 HYDROXY: CPT

## 2019-03-26 PROCEDURE — 72100 X-RAY EXAM L-S SPINE 2/3 VWS: CPT

## 2019-03-26 PROCEDURE — 83735 ASSAY OF MAGNESIUM: CPT

## 2019-03-26 PROCEDURE — 82607 VITAMIN B-12: CPT

## 2019-03-26 PROCEDURE — 86803 HEPATITIS C AB TEST: CPT

## 2019-03-26 PROCEDURE — 80053 COMPREHEN METABOLIC PANEL: CPT

## 2019-03-26 PROCEDURE — 85025 COMPLETE CBC W/AUTO DIFF WBC: CPT

## 2019-03-26 PROCEDURE — 81001 URINALYSIS AUTO W/SCOPE: CPT

## 2019-03-26 PROCEDURE — 83036 HEMOGLOBIN GLYCOSYLATED A1C: CPT

## 2019-03-26 PROCEDURE — 84439 ASSAY OF FREE THYROXINE: CPT

## 2019-03-26 PROCEDURE — 80061 LIPID PANEL: CPT

## 2019-03-26 RX ORDER — MELATONIN 10 MG
10 CAPSULE ORAL
COMMUNITY

## 2019-03-26 RX ORDER — LISINOPRIL AND HYDROCHLOROTHIAZIDE 12.5; 2 MG/1; MG/1
TABLET ORAL
Qty: 90 TAB | Refills: 3 | Status: SHIPPED | OUTPATIENT
Start: 2019-03-26 | End: 2019-04-10 | Stop reason: DRUGHIGH

## 2019-03-26 RX ORDER — GABAPENTIN 300 MG/1
CAPSULE ORAL
Qty: 60 CAP | Refills: 3 | Status: SHIPPED | OUTPATIENT
Start: 2019-03-26 | End: 2019-08-06

## 2019-03-26 NOTE — PROGRESS NOTES
Dana Ch is a 61 y.o.  female and presents with    Chief Complaint   Patient presents with    New Patient    Hypertension    Medication Refill    Back Pain     Chronic low back pain. Patient seen at [facility]        Subjective:  HPI  Mrs. Chandra presents to establish care. Mrs. Christina Little is single, living in family home. She is anticipating a possible move, unsure her next employment. 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     Mrs. Christina Little has a history of alcoholism 1988, esophagitis, hyperlipidemia, hypertension, depression, and osteoarthrosis of multiple sites, peripheral neuropathy. She is a former smoker of cigarettes and use of smokeless tobacco quit 2016. +[facility] breast cancer-mother    She is with hypertension and hyperlipidemia. She is controlled on Lisinopril-HCTZ 20 -12.5mg.     BP is elevated in office. She has a history of GERD, not needing Prilosec currently. She reports started smoking cigarettes again started last jan 2018. She quit prior to this, quit \"cold turkey\", failed Chantix, Nicotine patches rash, Wellbutrin \"made me crazy\". She is with history of OA at multiple sites. Chronic bilateral low back pain with sciatica, bilateral hip pain. Xray showed OA. She was seen by [facility] 11/2018 and recommended conservative treatment with PrN followup. She reports using Ibuprofen 300 mg BID, ice/heat, yoga 3 times a week x 3 months. She reports purchased a foam topper for bed. Knee to waist if lays on her sides she gets an ache. If lays on her back she reports a pain \"deep inside her butt\". She reports can localize the pain, she can pin point. Joined the [employer]. Pain worse if walking uphill, if walks from one side of [facility] to the other starts to exacerbate. 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 She was lifting her mother. Took care of their yard. She reports they have passed away. She reports increased stress. She has a history of alcoholism and depression and insomnia. She was given script to trial Gabapentin 300 mg QHS which she reported failed x 6 month. She is using  Melatonin for insomnia. She was using Budesonide for microscopic colitis. This medication is PRN use only and has not need x 2 months. She is followed by Dr. Evelyn Martinez. Mammogram completed 2/2018 negative for malignancy. Pap overdue. Colonoscopy up to date. Tdap up to date. Last Pap 2018, normal, with Dr. Nica Penn. Additional Concerns: none     ROS   Review of Systems   Constitutional: Negative. HENT: Negative. Eyes: Negative. Respiratory: Negative. Cardiovascular: Negative. Gastrointestinal: Negative. Genitourinary: Negative. Musculoskeletal: Positive for back pain and joint pain. Skin: Negative. Neurological: Positive for tingling. Hx of peripheral neuropathy   Endo/Heme/Allergies: Negative. Psychiatric/Behavioral: Negative. All other systems reviewed and are negative. Allergies   Allergen Reactions    Celebrex [Celecoxib] Rash       Current Outpatient Medications   Medication Sig Dispense Refill    varicella-zoster recombinant, PF, (SHINGRIX, PF,) 50 mcg/0.5 mL susr injection Inject first dose IM then second dose 4 to 6 months from the first injection 0.5 mL 1    melatonin 10 mg cap Take 10 mg by mouth.  lisinopril-hydroCHLOROthiazide (PRINZIDE, ZESTORETIC) 20-12.5 mg per tablet 1 qd 90 Tab 3    gabapentin (NEURONTIN) 300 mg capsule Take 2 tablets by mouth at bedtime as needed for pain 60 Cap 3    budesonide (ENTOCORT EC) 3 mg capsule Take 3 mg by mouth every morning.          Social History     Socioeconomic History    Marital status:      Spouse name: Not on file    Number of children: Not on file    Years of education: Not on file    Highest education level: Not on file   Occupational History    Not on file   Social Needs    Financial resource strain: Not on file   Griselda Food insecurity:     Worry: Not on file     Inability: Not on file    Transportation needs:     Medical: Not on file     Non-medical: Not on file   Tobacco Use    Smoking status: Former Smoker    Smokeless tobacco: Former User     Quit date: 1/9/2016   Substance and Sexual Activity    Alcohol use: No     Alcohol/week: 0.0 oz     Comment: quit 1988    Drug use: No    Sexual activity: Not Currently     Birth control/protection: Surgical     Comment: tubal ligation   Lifestyle    Physical activity:     Days per week: Not on file     Minutes per session: Not on file    Stress: Not on file   Relationships    Social connections:     Talks on phone: Not on file     Gets together: Not on file     Attends Advent service: Not on file     Active member of club or organization: Not on file     Attends meetings of clubs or organizations: Not on file     Relationship status: Not on file    Intimate partner violence:     Fear of current or ex partner: Not on file     Emotionally abused: Not on file     Physically abused: Not on file     Forced sexual activity: Not on file   Other Topics Concern    Not on file   Social History Narrative    Not on file       Past Medical History:   Diagnosis Date    Depressive disorder, not elsewhere classified     Esophagitis, unspecified     Essential hypertension, benign     Generalized osteoarthrosis, involving multiple sites     Mixed hyperlipidemia     Personal history of alcoholism ([facility] [address]        Past Surgical History:   Procedure Laterality Date    HX CHOLECYSTECTOMY      HX COLONOSCOPY  9/2010; 4/2018    ([facility]) diverticuli; 2nd colo showed polyp/lymphocytic colitis    HX TONSIL AND ADENOIDECTOMY      HX TUBAL LIGATION Bilateral        Family History   Problem Relation Age of Onset    Arthritis-osteo Mother     Ulcerative Colitis Mother     Breast Cancer Mother         breast, age70s    Heart Disease Father         chf    Hypertension Brother     Diabetes Brother     Elevated Lipids Brother     No Known Problems Brother     No Known Problems Brother        Objective:  Vitals:    03/26/19 1041   BP: 140/70   Pulse: 87   Resp: 16   Temp: 98.3 °F (36.8 °C)   TempSrc: Oral   SpO2: 97%   Weight: 120 lb (54.4 kg)   Height: 5' (1.524 m)   PainSc:   8   PainLoc: Back       LABS   Results for orders placed or performed in visit on 16/35/83   METABOLIC PANEL, BASIC   Result Value Ref Range    Glucose 101 (H) 65 - 99 mg/dL    BUN 15 8 - 27 mg/dL    Creatinine 0.62 0.57 - 1.00 mg/dL    GFR est non-AA 97 >59 mL/min/1.73    GFR est  >59 mL/min/1.73    BUN/Creatinine ratio 24 12 - 28    Sodium 143 134 - 144 mmol/L    Potassium 4.2 3.5 - 5.2 mmol/L    Chloride 101 96 - 106 mmol/L    CO2 23 18 - 29 mmol/L    Calcium 9.2 8.7 - 10.3 mg/dL   LIPID PANEL   Result Value Ref Range    Cholesterol, total 224 (H) 100 - 199 mg/dL    Triglyceride 106 0 - 149 mg/dL    HDL Cholesterol 43 >39 mg/dL    VLDL, calculated 21 5 - 40 mg/dL    LDL, calculated 160 (H) 0 - 99 mg/dL       TESTS  [facility] Results (most recent):  Results from Hospital Encounter encounter on 02/15/18   [facility] 3D [place] W MAMMO BI SCREENING INCL CAD    Narrative Digital Screening Mammogram with Tomosynthesis    History: Screening. Comparison: 2012, 2013, 2015, 2016    Technique: Digital mammography (2D and 3D Tomosynthesis) with CAD was performed. Routine views obtained. Findings: There are scattered, morphologically benign calcifications bilaterally which  demonstrate multiyear stability. No developing masses or suspicious  calcifications are seen. Impression Impression:    No evidence for malignancy. Suggest routine follow-up with annual mammographic screening. BIRADS 2 : Benign    The breasts are heterogenously dense, which may obscure small masses. Thank you for this referral.            PE  Physical Exam   Constitutional: She is oriented to person, place, and time.  She appears well-developed and well-nourished. No distress. HENT:   Head: Normocephalic and atraumatic. Right Ear: External ear normal.   Left Ear: External ear normal.   Nose: Nose normal.   Mouth/Throat: Oropharynx is clear and moist. No oropharyngeal exudate. Eyes: Pupils are equal, round, and reactive to light. Conjunctivae and EOM are normal. Right eye exhibits no discharge. Left eye exhibits no discharge. Neck: Normal range of motion. Cardiovascular: Normal rate, regular rhythm, normal heart sounds and intact distal pulses. Pulmonary/Chest: Effort normal and breath sounds normal.   Abdominal: Soft. Bowel sounds are normal. She exhibits no distension. There is no tenderness. Musculoskeletal: Normal range of motion. Lymphadenopathy:     She has no cervical adenopathy. Neurological: She is alert and oriented to person, place, and time. She has normal strength. No cranial nerve deficit or sensory deficit. Coordination normal.   Reflex Scores:       Patellar reflexes are 3+ on the right side and 2+ on the left side. Left Foot:   Visual Exam: normal    Pulse DP: 2+ (normal)   Filament test: normal sensation    Vibratory sensation: normal      Right Foot:   Visual Exam: normal    Pulse DP: 2+ (normal)   Filament test: normal sensation    Vibratory sensation: normal    Hearing and vision not checked   Skin: Skin is warm and dry. She is not diaphoretic. Psychiatric: She has a normal mood and affect. Her behavior is normal. Judgment and thought content normal.   Vitals reviewed. Assessment/Plan:    1. Establish care- CPE without pap today, labs ordered. I need records for pap with Dr. Sherry Ferreira and she thinks they also performed the bone scan. 2. Chronic peripheral neuropathy BLE to feet > 20 years- labs ordered. She is unsure if had EMG in the past. Possible relationship to back pain which is also chronic. Increase Gabapentin to 600 mg QHS.      3. Chronic low back pain midline with sciatica that is reproducible, Bilateral mid gluteal pain that is reproducible and localized with radiation to sciatica-  Xrays ordered, labs ordered to include lymes as she is a hiker with hx of multiple tick bites. I feel the mid gluteal pain is Piriformis Syndrome, handouts provided, encouraged to continue Yoga, water therapy- she attends the [employer], [employer], [employer] etc, massage, ice. Increased Gabapentin to 600 mg QHS, plan of care to adjust depending on results. Lab review: orders written for new lab studies as appropriate; see orders    Today's Visit:   Diagnoses and all orders for this visit:    1. Mixed hyperlipidemia  -     CBC WITH AUTOMATED DIFF; Future  -     METABOLIC PANEL, COMPREHENSIVE; Future  -     LIPID PANEL; Future  -     HEMOGLOBIN A1C WITH EAG; Future  -     URINALYSIS W/ RFLX MICROSCOPIC; Future    2. Need for shingles vaccine  -     varicella-zoster recombinant, PF, (SHINGRIX, PF,) 50 mcg/0.5 mL susr injection; Inject first dose IM then second dose 4 to 6 months from the first injection    3. Screening breast examination  -     LATRICIA MAMMO BI SCREENING INCL CAD; Future    4. Need for hepatitis C screening test  -     HEPATITIS C AB; Future    5. At risk for decreased bone density  -     VITAMIN D, 25 HYDROXY; Future    6. Generalized osteoarthrosis, involving multiple sites  -     VITAMIN D, 25 HYDROXY; Future    7. Essential hypertension, benign  -     CBC WITH AUTOMATED DIFF; Future  -     METABOLIC PANEL, COMPREHENSIVE; Future  -     LIPID PANEL; Future  -     HEMOGLOBIN A1C WITH EAG; Future  -     URINALYSIS W/ RFLX MICROSCOPIC; Future    8. Chronic peripheral neuropathic pain  -     METABOLIC PANEL, COMPREHENSIVE; Future  -     HEMOGLOBIN A1C WITH EAG; Future  -     VITAMIN B12; Future  -     MAGNESIUM; Future  -     TSH 3RD GENERATION; Future  -     T4, FREE;  Future  -     XR SPINE LUMB 2 OR 3 V; Future  -     XR HIP RT W OR WO PELV 2-3 VWS; Future  -     XR HIP LT W OR WO PELV 2-3 VWS; Future  -     gabapentin (NEURONTIN) 300 mg capsule; Take 2 tablets by mouth at bedtime as needed for pain  -     LYME AB, IGG & IGM BY WB; Future  -     ANTINUCLEAR ANTIBODIES, IFA; Future  -     PROTEIN ELECTROPHORESIS; Future  -     SED RATE (ESR); Future    9. Routine general medical examination at health care facility    10. Encounter to establish care    Other orders  -     lisinopril-hydroCHLOROthiazide (PRINZIDE, ZESTORETIC) 20-12.5 mg per tablet; 1 qd      Health Maintenance: Mammogram ordered. Shingrix script sent. [place] up to date. Pap up to date. I have discussed the diagnosis with the patient and the intended plan as seen in the above orders. The patient has received an after-visit summary and questions were answered concerning future plans. I have discussed medication side effects and warnings with the patient as well. I have reviewed the plan of care with the patient, accepted their input and they are in agreement with the treatment goals. Follow-up and Dispositions    · Return in about 2 weeks (around 4/9/2019) for blood pressure, neuropathy. More than 1/2 of this 60 minute visit was spent in counseling and coordination of care, as described above. SAGAR Caldwell  Internist of [address], [address] [place] [address].  Phone: [phone]  Fax: [phone]        -------------------------------------------------------------------------------------------------------------------------------------------------  Vinod Perales is a 61 y.o.  female and presents for a preventive health care visit   Subjective:  Health Maintenance History  Immunizations reviewed, Shingrix overdue. Mammogram :up to date, dexascan: up to date- need records,pap smear : up to date need records,colonoscopy: up to date, Eye exam: up to date,  Chest CT:N/A,    ROS   Negative except for back/joint pain/muscle pain.     General: negative for - chills, fatigue, fever, weight change  Psych: negative for - anxiety, depression, irritability or mood swings  ENT: negative for - headaches, hearing change, nasal congestion, oral lesions, sneezing or sore throat  Heme/ Lymph: negative for - bleeding problems, bruising, pallor or swollen lymph nodes  Endo: negative for - hot flashes, polydipsia/polyuria or temperature intolerance  Resp: negative for - cough, shortness of breath or wheezing  CV: negative for - chest pain, edema or palpitations  GI: negative for - abdominal pain, change in bowel habits, constipation, diarrhea or nausea/vomiting  : negative for - dysuria, hematuria, incontinence, pelvic pain or vulvar/vaginal symptoms  MSK: negative for -  joint swelling   Neuro: negative for - confusion, headaches, seizures or weakness  Derm: negative for - dry skin, hair changes, rash or skin lesion changes      Objective:  Vitals:    03/26/19 1041   BP: 140/70   Pulse: 87   Resp: 16   Temp: 98.3 °F (36.8 °C)   TempSrc: Oral   SpO2: 97%   Weight: 120 lb (54.4 kg)   Height: 5' (1.524 m)   PainSc:   8   PainLoc: Back       alert, well appearing, and in no distress, oriented to person, place, and time and normal appearing weight    Visit Vitals  /70   Pulse 87   Temp 98.3 °F (36.8 °C) (Oral)   Resp 16   Ht 5' (1.524 m)   Wt 120 lb (54.4 kg)   SpO2 97%   BMI 23.44 kg/m²       General appearance  alert, cooperative, no distress, appears stated age   Head  Normocephalic, without obvious abnormality, atraumatic   Eyes  conjunctivae/corneas clear. PERRL, EOM's intact. Fundi benign   Ears  normal TM's and external ear canals AU   Nose Nares normal. Septum midline. Mucosa normal. No drainage or sinus tenderness. Throat Lips, mucosa, and tongue normal. Teeth and gums normal   Neck supple, symmetrical, trachea midline, no adenopathy, thyroid: not enlarged, symmetric, no tenderness/mass/nodules, no carotid bruit and no JVD   Back   symmetric, no curvature.  ROM normal. No CVA tenderness   Lungs   clear to auscultation bilaterally   Breasts  no masses, tenderness   Heart  regular rate and rhythm, S1, S2 normal, no murmur, click, rub or gallop   Abdomen   soft, non-tender. Bowel sounds normal. No masses,  No organomegaly   Pelvic Deferred   Extremities extremities normal, atraumatic, no cyanosis or edema   Pulses 2+ and symmetric   Skin Skin color, texture, turgor normal. No rashes or lesions   Lymph nodes Cervical, supraclavicular, and axillary nodes normal.   Neurologic Normal         LABS  none  TESTS  none    Assessment/Plan:    Health Maintenance up to date. Recommend f/u physical 1 year. Routine screening labs/tests recommended prior to next physical.      I have discussed the diagnosis with the patient and the intended plan as seen in the above orders. The patient has received an after-visit summary and questions were answered concerning future plans. I have discussed medication side effects and warnings with the patient as well. I have reviewed the plan of care with the patient, accepted their input and they are in agreement with the treatment goals. Follow-up and Dispositions    · Return in about 2 weeks (around 4/9/2019) for blood pressure, neuropathy.

## 2019-03-26 NOTE — PROGRESS NOTES
Angel Milton presents today for   Chief Complaint   Patient presents with    New Patient    Hypertension    Medication Refill    Back Pain     Chronic low back pain. Patient seen at [facility]                 Depression Screening:  3 most recent [address] 3/26/2019   Little interest or pleasure in doing things Not at all   Feeling down, depressed, irritable, or hopeless Not at all   Total Score PHQ 2 0       Learning Assessment:  Learning Assessment 9/24/2015   PRIMARY LEARNER Patient   HIGHEST LEVEL OF EDUCATION - PRIMARY LEARNER  SOME COLLEGE   BARRIERS PRIMARY LEARNER NONE   CO-LEARNER CAREGIVER No   PRIMARY LANGUAGE ENGLISH   LEARNER PREFERENCE PRIMARY DEMONSTRATION   ANSWERED BY patient   RELATIONSHIP SELF       Abuse Screening:  Abuse Screening Questionnaire 3/26/2019   Do you ever feel afraid of your partner? N   Are you in a relationship with someone who physically or mentally threatens you? N   Is it safe for you to go home? Y       Fall Risk  Fall Risk Assessment, last 12 mths 3/26/2019   Able to walk? Yes   Fall in past 12 months? No           Coordination of Care:  1. Have you been to the ER, urgent care clinic since your last visit? Hospitalized since your last visit? New patient    2. Have you seen or consulted any other health care providers outside of the [address] [place] [address] since your last visit? Include any pap smears or colon screening.  yes

## 2019-03-26 NOTE — PATIENT INSTRUCTIONS
Piriformis Syndrome: Exercises  Your Care Instructions  Here are some examples of typical rehabilitation exercises for your condition. Start each exercise slowly. Ease off the exercise if you start to have pain. Your doctor or physical therapist will tell you when you can start these exercises and which ones will work best for you. How to do the exercises  Hip rotator stretch    1. Lie on your back with both knees bent and your feet flat on the floor. 2. Put the ankle of your affected leg on your opposite thigh near your knee. 3. Use your hand to gently push your knee (on your affected leg) away from your body until you feel a gentle stretch around your hip. 4. Hold the stretch for 15 to 30 seconds. 5. Repeat 2 to 4 times. 6. Switch legs and repeat steps 1 through 5. Piriformis stretch    1. Lie on your back with your legs straight. 2. Lift your affected leg and bend your knee. With your opposite hand, reach across your body, and then gently pull your knee toward your opposite shoulder. 3. Hold the stretch for 15 to 30 seconds. 4. Repeat with your other leg. 5. Repeat 2 to 4 times on each side. Lower abdominal strengthening    1. Lie on your back with your knees bent and your feet flat on the floor. 2. Tighten your belly muscles by pulling your belly button in toward your spine. 3. Lift one foot off the floor and bring your knee toward your chest, so that your knee is straight above your hip and your leg is bent like the letter \"L. \"  4. Lift the other knee up to the same position. 5. Lower one leg at a time to the starting position. 6. Keep alternating legs until you have lifted each leg 8 to 12 times. 7. Be sure to keep your belly muscles tight and your back still as you are moving your legs. Be sure to breathe normally. Follow-up care is a key part of your treatment and safety. Be sure to make and go to all appointments, and call your doctor if you are having problems.  It's also a good idea to know your test results and keep a list of the medicines you take. Current as of: September 20, 2018  Content Version: 11.9  © 2006-2018 [employer]. Care instructions adapted under license by [employer] (which disclaims liability or warranty for this information). If you have questions about a medical condition or this instruction, always ask your healthcare professional. [address] any warranty or liability for your use of this information. Learning About Peripheral Neuropathy  What is peripheral neuropathy? Peripheral neuropathy is a problem that affects the peripheral nerves. These nerves lead from the spinal cord to other parts of the body. They control your sense of touch, how you feel pain and temperature, and your muscle strength. Most of the time the problem starts in the fingers and toes. As it gets worse, it moves into the limbs. It can cause pain and loss of feeling in the feet, legs, and hands. What causes it? There are several causes:  · Diabetes. This is the most common cause. If your blood sugar is too high for too long, it can damage the nerves. · Kidney problems. These can lead to toxic substances in the blood that damage nerves. · Low levels of thyroid hormone (hypothyroidism). This may cause swelling of the tissues around the nerves, which can put pressure on them. · Infectious or inflammatory diseases. Examples are HIV and Guillain-Barré syndrome. These diseases can damage the nerves. · Peripheral nerve injury. A physical injury can damage the nerves. Injuries can be from things like falls, car crashes, or playing sports. · Overusing alcohol and not eating a healthy diet. These can lead to your body not having enough of certain vitamins, such as vitamin B-12. This can damage nerves. · Being exposed to toxic substances. These include lead, mercury, and certain medicines, such as those used for chemotherapy.   Sometimes the cause isn't known. What are the symptoms? Symptoms of peripheral neuropathy can occur slowly over time. The most common ones are:  · Numbness, tightness, and tingling, especially in the legs, hands, and feet. · Loss of feeling. · Burning, shooting, or stabbing pain in the legs, hands, and feet. Often the pain is worse at night. · Weakness and loss of balance. What can happen if you have it? If peripheral neuropathy gets worse, it can lead to a complete lack of feeling in your hands or feet. This can make you more likely to injure them. It may lead to calluses and blisters. It can also lead to bone and joint problems, infection, and ulcers. For instance, small, repeated injuries to the foot may lead to bigger problems. This can happen because you can't feel the injuries. Reduced feeling in the feet can also change your step, leading to bone or joint problems. If untreated, foot problems can become so severe that the foot or lower leg may have to be amputated. But treatment can slow down peripheral neuropathy. And it's a good idea to take care to avoid injury. How is it diagnosed? To diagnose peripheral neuropathy, your doctor will ask you about:  · Your symptoms. · Your medical history. This may include your use of alcohol, risk of HIV infection, or exposure to toxic substances. · Your family's medical history, including nerve disease. Your doctor may test how well you can feel touch, temperature, and pain. You may also have blood tests. These tests will help the doctor find out if you have conditions that can cause neuropathy. Examples are diabetes, vitamin deficiencies, thyroid disease, and kidney problems. How is it treated? Treatment for peripheral neuropathy can relieve symptoms. This is done by treating the health problem that's causing it. For example, if you have diabetes, keeping your blood sugar within your target range may help.  Or maybe your body lacks certain vitamins caused by drinking too much alcohol. In that case, treatment may include eating a healthy diet, taking vitamins, and stopping alcohol use. You may have physical therapy. This can increase muscle strength and help build muscle control. Over-the-counter medicine can relieve mild nerve pain. Your doctor may also prescribe medicine to help with severe pain, numbness, tingling, and weakness. If you have neuropathy in your feet, it's a good idea to have them checked during each office visit. This can help prevent problems. Some people find that physical therapy, acupuncture, or transcutaneous electrical nerve stimulation (TENS) helps relieve pain. Follow-up care is a key part of your treatment and safety. Be sure to make and go to all appointments, and call your doctor if you are having problems. It's also a good idea to know your test results and keep a list of the medicines you take. Where can you learn more? Go to http://carolyne[employer] Service[employer]iana.info/. Enter P130 in the search box to learn more about \"Learning About Peripheral Neuropathy. \"  Current as of: July 25, 2018  Content Version: 11.9  © 0932-2332 [employer]. Care instructions adapted under license by [employer] (which disclaims liability or warranty for this information). If you have questions about a medical condition or this instruction, always ask your healthcare professional. [address] any warranty or liability for your use of this information. Learning About Relief for Back Pain  What is back tension and strain? Back strain happens when you overstretch, or pull, a muscle in your back. You may hurt your back in an accident or when you exercise or lift something. Most back pain will get better with rest and time. You can take care of yourself at home to help your back heal.  What can you do first to relieve back pain? When you first feel back pain, try these steps:  · Walk.  Take a short walk (10 to 20 minutes) on a level surface (no slopes, hills, or stairs) every 2 to 3 hours. Walk only distances you can manage without pain, especially leg pain. · Relax. Find a comfortable position for rest. Some people are comfortable on the floor or a medium-firm bed with a small pillow under their head and another under their knees. Some people prefer to lie on their side with a pillow between their knees. Don't stay in one position for too long. · Try heat or ice. Try using a heating pad on a low or medium setting, or take a warm shower, for 15 to 20 minutes every 2 to 3 hours. Or you can buy single-use heat wraps that last up to 8 hours. You can also try an ice pack for 10 to 15 minutes every 2 to 3 hours. You can use an ice pack or a bag of frozen vegetables wrapped in a thin towel. There is not strong evidence that either heat or ice will help, but you can try them to see if they help. You may also want to try switching between heat and cold. · Take pain medicine exactly as directed. ? If the doctor gave you a prescription medicine for pain, take it as prescribed. ? If you are not taking a prescription pain medicine, ask your doctor if you can take an over-the-counter medicine. What else can you do? · Stretch and exercise. Exercises that increase flexibility may relieve your pain and make it easier for your muscles to keep your spine in a good, neutral position. And don't forget to keep walking. · Do self-massage. You can use self-massage to unwind after work or school or to energize yourself in the morning. You can easily massage your feet, hands, or neck. Self-massage works best if you are in comfortable clothes and are sitting or lying in a comfortable position. Use oil or lotion to massage bare skin. · Reduce stress. Back pain can lead to a vicious [place]: Distress about the pain tenses the muscles in your back, which in turn causes more pain.  Learn how to relax your mind and your muscles to lower your stress. Where can you learn more? Go to http://carolyne-fabiana.info/. Enter U226 in the search box to learn more about \"Learning About Relief for Back Pain. \"  Current as of: September 20, 2018  Content Version: 11.9  © 4220-3179 [employer]. Care instructions adapted under license by [employer] (which disclaims liability or warranty for this information). If you have questions about a medical condition or this instruction, always ask your healthcare professional. [place] [address] any warranty or liability for your use of this information. Low Back Pain: Exercises  Your Care Instructions  Here are some examples of typical rehabilitation exercises for your condition. Start each exercise slowly. Ease off the exercise if you start to have pain. Your doctor or physical therapist will tell you when you can start these exercises and which ones will work best for you. How to do the exercises  Press-up    1. Lie on your stomach, supporting your body with your forearms. 2. Press your elbows down into the floor to raise your upper back. As you do this, relax your stomach muscles and allow your back to arch without using your back muscles. As your press up, do not let your hips or pelvis come off the floor. 3. Hold for 15 to 30 seconds, then relax. 4. Repeat 2 to 4 times. Alternate arm and leg (bird dog) exercise    1. Start on the floor, on your hands and knees. 2. Tighten your belly muscles. 3. Raise one leg off the floor, and hold it straight out behind you. Be careful not to let your hip drop down, because that will twist your trunk. 4. Hold for about 6 seconds, then lower your leg and switch to the other leg. 5. Repeat 8 to 12 times on each leg. 6. Over time, work up to holding for 10 to 30 seconds each time.   7. If you feel stable and secure with your leg raised, try raising the opposite arm straight out in front of you at the same time.    Knee-to-chest exercise    1. Lie on your back with your knees bent and your feet flat on the floor. 2. Bring one knee to your chest, keeping the other foot flat on the floor (or keeping the other leg straight, whichever feels better on your lower back). 3. Keep your lower back pressed to the floor. Hold for at least 15 to 30 seconds. 4. Relax, and lower the knee to the starting position. 5. Repeat with the other leg. Repeat 2 to 4 times with each leg. 6. To get more stretch, put your other leg flat on the floor while pulling your knee to your chest.    Curl-ups    1. Lie on the floor on your back with your knees bent at a 90-degree angle. Your feet should be flat on the floor, about 12 inches from your buttocks. 2. Cross your arms over your chest. If this bothers your neck, try putting your hands behind your neck (not your head), with your elbows spread apart. 3. Slowly tighten your belly muscles and raise your shoulder blades off the floor. 4. Keep your head in line with your body, and do not press your chin to your chest.  5. Hold this position for 1 or 2 seconds, then slowly lower yourself back down to the floor. 6. Repeat 8 to 12 times. Pelvic tilt exercise    1. Lie on your back with your knees bent. 2. \"Brace\" your stomach. This means to tighten your muscles by pulling in and imagining your belly button moving toward your spine. You should feel like your back is pressing to the floor and your hips and pelvis are rocking back. 3. Hold for about 6 seconds while you breathe smoothly. 4. Repeat 8 to 12 times. Heel dig bridging    1. Lie on your back with both knees bent and your ankles bent so that only your heels are digging into the floor. Your knees should be bent about 90 degrees. 2. Then push your heels into the floor, squeeze your buttocks, and lift your hips off the floor until your shoulders, hips, and knees are all in a straight line.   3. Hold for about 6 seconds as you continue to breathe normally, and then slowly lower your hips back down to the floor and rest for up to 10 seconds. 4. Do 8 to 12 repetitions. Hamstring stretch in doorway    1. Lie on your back in a doorway, with one leg through the open door. 2. Slide your leg up the wall to straighten your knee. You should feel a gentle stretch down the back of your leg. 3. Hold the stretch for at least 15 to 30 seconds. Do not arch your back, point your toes, or bend either knee. Keep one heel touching the floor and the other heel touching the wall. 4. Repeat with your other leg. 5. Do 2 to 4 times for each leg. Hip flexor stretch    1. Kneel on the floor with one knee bent and one leg behind you. Place your forward knee over your foot. Keep your other knee touching the floor. 2. Slowly push your hips forward until you feel a stretch in the upper thigh of your rear leg. 3. Hold the stretch for at least 15 to 30 seconds. Repeat with your other leg. 4. Do 2 to 4 times on each side. Wall sit    1. Stand with your back 10 to 12 inches away from a wall. 2. Lean into the wall until your back is flat against it. 3. Slowly slide down until your knees are slightly bent, pressing your lower back into the wall. 4. Hold for about 6 seconds, then slide back up the wall. 5. Repeat 8 to 12 times. Follow-up care is a key part of your treatment and safety. Be sure to make and go to all appointments, and call your doctor if you are having problems. It's also a good idea to know your test results and keep a list of the medicines you take. Where can you learn more? Go to http://carolyne-fabiana.info/. Enter V920 in the search box to learn more about \"Low Back Pain: Exercises. \"  Current as of: September 20, 2018  Content Version: 11.9  © 4859-7491 [employer]. Care instructions adapted under license by [employer] (which disclaims liability or warranty for this information).  If you have questions about a medical condition or this instruction, always ask your healthcare professional. [address] any warranty or liability for your use of this information. Therapeutic Ball: Back Exercises  Your Care Instructions  Here are some examples of typical exercises for your condition. Start each exercise slowly. Ease off the exercise if you start to have pain. Your doctor or physical therapist will tell you when you can start these exercises and which ones will work best for you. To prepare, make sure that your ball is the right size for you. When inflated and firm, it should allow you to sit with your hips and knees bent at about a 90-degree angle (like the letter L). How to do the exercises  Seated position on ball    1. Use this exercise to get used to moving on the ball and to find your best sitting position. 2. Sit comfortably on the ball with your feet about hip-width apart. If you feel unsteady, rest your hands on the ball near your hips. 3. As you do this exercise, try to keep your shoulders and upper body relaxed and still. 4. Using your stomach and back muscles to move your pelvis, roll the ball forward. This will round your back. 5. Still using your stomach and back muscles, roll the ball back. You will arch your back. 6. Repeat this rounding-arching motion a few times. 7. Stop in between the two positions, where your back is not rounded or arched. This is called your neutral position. Pelvic rotation    1. Sit tall on the ball. 2. Slowly rotate your hips in a [place]well pattern. Keep the movement focused at your hips. 3. Repeat, but [place] in the other direction. 4. Repeat 8 to 12 times. Postural sitting    1. Use this position to find a stable, relaxed posture on the ball. You can use this position as your starting point for other ball exercises.  If you feel unsteady on the ball, start on a chair first.  2. Sit on a ball or chair, with your feet planted straight in front of you. 3. Imagine that a string at the top of your head is pulling you straight up. Think of yourself as 2 inches taller than you are.  4. Slightly tuck your chin. 5. Keep your shoulders back and relaxed. Knee extension    1. Sit tall on the ball with your feet planted in front of you, hip-width apart. As you do this exercise, avoid slumping your shoulders and arching your back. 2. Rest your hands on the ball near your hip or a steady object next to you. (If you feel very stable on the ball, rest your hands in your lap or at your side.)  3. Slowly straighten one leg at the knee. Slowly lower it back down. Repeat with the other leg. 4. Repeat this exercise 8 to 12 times. Roll-ups    1. Lie on your back with your knees bent, feet resting on the floor. 2. Lay the ball on your thighs. Rest your hands up high on the ball. 3. Raising your head and shoulder blades, roll the ball up your thighs. Exhale as you roll up. 4. If this is hard on your neck, gently support your lower head and upper neck with one hand. Don't use that hand to pull your head up. 5. Repeat 8 to 12 times. Ball curls    1. Lie on your back with your ankles resting on the ball, knees straight. 2. Use your legs to roll the exercise ball toward you. Allow your knees to bend and move closer to your chest.  3. Pause briefly, and then roll the ball to the starting position. Try to keep the ball rolling straight. You will feel the muscles in your lower belly working. 4. Repeat 8 to 12 times. Bridge with ball under legs    1. Lie on your back with your legs up, calves resting on the ball. For more challenge, rest your heels on the ball. 2. Look up at the ceiling, and keep your chin relaxed. You can place a small pillow under your head or neck for comfort. 3. With your arms by your side, press your hands onto the floor for stability.   4. Tighten your belly muscles by pulling in your belly button toward your spine.  5. Push your heels down toward the floor, squeeze your buttocks, and lift your hips off the floor until your shoulders, hips, and knees are all in a straight line. 6. Try to keep the ball steady. Hold for about 6 seconds as you continue to breathe normally. 7. Slowly lower your hips back down to the floor. 8. Repeat 8 to 12 times. Ball curls with bridge    1. Start flat on your back with your ankles resting on the ball. 2. Look up at the ceiling, and keep your chin relaxed. You can place a small pillow under your head or neck for comfort. 3. With your arms by your side, press your hands onto the floor for stability. 4. Tighten your belly muscles by pulling in your belly button toward your spine. 5. Push your heels down toward the floor, squeeze your buttocks, and lift your hips off the floor until your shoulders, hips, and knees are all in a straight line. 6. While holding the bridge position, roll the ball toward you with your heels. Keep your hips as level as you can. 7. Pause briefly, and then roll the ball back out. Try to keep the ball rolling straight. You will feel the muscles in your lower belly working as you straighten your legs. 8. Lower your hips, and return to your starting position. 9. Repeat 8 to 12 times. 10. When you can keep your body and the ball steady throughout this exercise, you're ready for more challenge. Try keeping your hips raised while rolling the ball out, holding the bridge, and rolling back, a few times in a row. Praying cherrie    1. Kneel upright with the ball in front of you. 2. To start, clasp your hands together. Rest them on the ball in front of you. 3. As you do this exercise, keep your back and hips straight and tighten your belly and buttocks muscles. Keep your knees in place. 4. Press on the ball with your arms. Lean forward from the knees. This rolls the ball forward. You will bear most of your weight on your arms.   5. If your back starts to ache, you've gone too far. Pull back a bit. 6. Roll back to the start position. 7. Repeat 8 to 12 times. Walk-out plank on ball    1. Kneel over the ball. Place your hands on the floor in front of you. 2. Walk your hands forward until your legs are straight on the ball. This is the plank position. 3. When in plank position, hold your body straight and tighten your belly and buttocks muscles. Keep your chin slightly tucked. 4. Roll as far forward as you can without losing your balance or letting your hips drop. You may stop with the ball under your thighs, or even under your knees or shins. 5. Hold a few seconds, then walk your hands back and return to the start position. 6. Repeat 8 to 12 times. Push-up with thighs on ball    1. Kneel over the ball. Place your hands on the floor in front of you. 2. Walk your hands forward until your legs are straight on the ball. This is the plank position. 3. When in plank position, hold your body straight and tighten your belly and buttocks muscles. Keep your chin slightly tucked. 4. Roll as far forward as you can without losing your balance or letting your hips drop. You may stop with the ball under your thighs, or even under your knees or shins. 5. Bend your elbows. Slowly lower your body toward the ground as far as you can without losing your balance. 6. If your wrists hurt, try moving your hands a little farther apart so they're not right under your shoulders. 7. Slowly straighten your arms. 8. Do 8 to 12 of these push-ups. Wall squat with ball    1. Stand facing away from a wall. Place your feet about shoulder-width apart. 2. Place the ball between your middle back and the wall. Move your feet out in front of you so they are about a foot in front of your hips. 3. Keep your arms at your sides, or put your hands on your hips. 4. Slowly squat down as if you are going to sit in a chair, rolling your back over the ball as you squat.  The ball should move with you but stay pressed into the wall. 5. Be sure that your knees do not go in front of your toes as you squat. 6. Hold for 6 seconds. 7. Slowly rise to your standing position. 8. Repeat 8 to 12 times. Child's pose with ball    1. Kneeling upright with your back straight, rest your hands on the ball in front of you. 2. Breathe out as you bend at the hips, and roll the ball forward. Lower your chest toward the ground, and drop your hips back toward your heels. 3. To stretch your upper back and shoulders, hold this position for 15 to 30 seconds. 4. Repeat 2 to 4 times. Follow-up care is a key part of your treatment and safety. Be sure to make and go to all appointments, and call your doctor if you are having problems. It's also a good idea to know your test results and keep a list of the medicines you take. Where can you learn more? Go to http://carolyne-fabiana.info/. Enter E110 in the search box to learn more about \"Therapeutic Ball: Back Exercises. \"  Current as of: September 20, 2018  Content Version: 11.9  © 0255-5133 [employer]. Care instructions adapted under license by [employer] (which disclaims liability or warranty for this information). If you have questions about a medical condition or this instruction, always ask your healthcare professional. [address] any warranty or liability for your use of this information.         Recombinant Zoster (Shingles) Vaccine, RZV: What you need to know  Why get vaccinated? Shingles (also called herpes zoster, or just zoster) is a painful skin rash, often with blisters. Shingles is caused by the varicella zoster virus, the same virus that causes chickenpox. After you have chickenpox, the virus stays in your body and can cause shingles later in life. You can't catch shingles from another person.  However, a person who has never had chickenpox (or chickenpox vaccine) could get chickenpox from someone with shingles. A shingles rash usually appears on one side of the face or body and heals within 2 to 4 weeks. Its main symptom is pain, which can be severe. Other symptoms can include fever, headache, chills, and upset stomach. Very rarely, a shingles infection can lead to pneumonia, hearing problems, blindness, brain inflammation (encephalitis), or death. For about 1 person in 5, severe pain can continue even long after the rash has cleared up. This long-lasting pain is called post-herpetic neuralgia (PHN). Shingles is far more common in people 48years of age and older than in younger people, and the risk increases with age. It is also more common in people whose immune system is weakened because of a disease such as cancer, or by drugs such as steroids or chemotherapy. At least 1 million people a year in the [facility] get shingles. Shingles vaccine (recombinant)  Recombinant shingles vaccine was approved by FDA in 2017 for the prevention of shingles. In clinical trials, it was more than 90% effective in preventing shingles. It can also reduce the likelihood of PHN. Two doses, 2 to 6 months apart, are recommended for adults 48 and older. This vaccine is also recommended for people who have already gotten the live shingles vaccine (Zostavax). There is no live virus in this vaccine. Some people should not get this vaccine  Tell your vaccine provider if you:  · Have any severe, life-threatening allergies. A person who has ever had a life-threatening allergic reaction after a dose of recombinant shingles vaccine, or has a severe allergy to any component of this vaccine, may be advised not to be vaccinated. Ask your health care provider if you want information about vaccine components. · Are pregnant or breastfeeding. There is not much information about use of recombinant shingles vaccine in pregnant or nursing women. Your healthcare provider might recommend delaying vaccination.   · Are not feeling well. If you have a mild illness, such as a cold, you can probably get the vaccine today. If you are moderately or severely ill, you should probably wait until you recover. Your doctor can advise you. Risks of a vaccine reaction  With any medicine, including vaccines, there is a chance of reactions. After recombinant shingles vaccination, a person might experience:  · Pain, redness, soreness, or swelling at the site of the injection  · Headache, muscle aches, fever, shivering, fatigue  In clinical trials, most people got a sore arm with mild or moderate pain after vaccination, and some also had redness and swelling where they got the shot. Some people felt tired, had muscle pain, a headache, shivering, fever, stomach pain, or nausea. About 1 out of 6 people who got recombinant zoster vaccine experienced side effects that prevented them from doing regular activities. Symptoms went away on their own in about 2 to 3 days. Side effects were more common in younger people. You should still get the second dose of recombinant zoster vaccine even if you had one of these reactions after the first dose. Other things that could happen after this vaccine:  · People sometimes faint after medical procedures, including vaccination. Sitting or lying down for about 15 minutes can help prevent fainting and injuries caused by a fall. Tell your provider if you feel dizzy or have vision changes or ringing in the ears. · Some people get shoulder pain that can be more severe and longer-lasting than routine soreness that can follow injections. This happens very rarely. · Any medication can cause a severe allergic reaction. Such reactions to a vaccine are estimated at about 1 in a million doses, and would happen within a few minutes to a few hours after the vaccination. As with any medicine, there is a very remote chance of a vaccine causing a serious injury or death. The safety of vaccines is always being monitored.  For more information, visit: www.cdc.gov/vaccinesafety/  What if there is a serious problem? What should I look for? · Look for anything that concerns you, such as signs of a severe allergic reaction, very high fever, or unusual behavior. Signs of a severe allergic reaction can include hives, swelling of the face and throat, difficulty breathing, a fast heartbeat, dizziness, and weakness. These would usually start a few minutes to a few hours after the vaccination. What should I do? · If you think it is a severe allergic reaction or other emergency that can't wait, call 9-1-1 or get to the nearest hospital. Otherwise, call your health care provider. · Afterward, the reaction should be reported to the Vaccine Adverse Event Reporting System (VAERS). Your doctor should file this report, or you can do it yourself through the VAERS website at www.vaers. [employer].gov, or by calling 2-773.477.9327. VAERS does not give medical advice. .  How can I learn more? · Ask your health care provider. He or she can give you the vaccine package insert or suggest other sources of information. · Call your local or state health department. · Contact the Centers for Disease Control and Prevention (CDC):  ? Call 6-566.414.2739 (1-800-CDC-INFO) or  ? Visit CDC's website at www.cdc.gov/vaccines  Vaccine Information Statement (Interim)  Recombinant Zoster Vaccine  2/12/2018  [facility] and [facility] for Disease Control and Prevention  Many Vaccine Information Statements are available in Yoruba and other languages. See www.immunize.org/vis. Hojas de Información Sobre Vacunas están disponibles en Español y en muchos otros idiomas. Visite Rosi.si   Care instructions adapted under license by [employer] (which disclaims liability or warranty for this information).  If you have questions about a medical condition or this instruction, always ask your healthcare professional. Ania Smith disclaims any warranty or liability for your use of this information.

## 2019-03-27 LAB
25(OH)D3 SERPL-MCNC: 29.6 NG/ML (ref 30–100)
ALBUMIN SERPL ELPH-MCNC: 3.9 G/DL (ref 2.9–4.4)
ALBUMIN/GLOB SERPL: 1 {RATIO} (ref 0.7–1.7)
ALPHA1 GLOB SERPL ELPH-MCNC: 0.2 G/DL (ref 0–0.4)
ALPHA2 GLOB SERPL ELPH-MCNC: 0.9 G/DL (ref 0.4–1)
B-GLOBULIN SERPL ELPH-MCNC: 1.5 G/DL (ref 0.7–1.3)
GAMMA GLOB SERPL ELPH-MCNC: 1.1 G/DL (ref 0.4–1.8)
GLOBULIN SER CALC-MCNC: 3.8 G/DL (ref 2.2–3.9)
HCV AB SER IA-ACNC: <0.02 INDEX
HCV AB SERPL QL IA: NEGATIVE
HCV COMMENT,HCGAC: NORMAL
M PROTEIN SERPL ELPH-MCNC: ABNORMAL G/DL
PROT SERPL-MCNC: 7.7 G/DL (ref 6–8.5)

## 2019-03-29 DIAGNOSIS — M85.89 OSTEOPENIA OF MULTIPLE SITES: Primary | ICD-10-CM

## 2019-03-29 PROBLEM — M85.80 OSTEOPENIA: Status: ACTIVE | Noted: 2019-03-29

## 2019-03-29 LAB
ANA TITR SER IF: NEGATIVE {TITER}
B BURGDOR IGG PATRN SER IB-IMP: NEGATIVE
B BURGDOR IGM PATRN SER IB-IMP: NEGATIVE
B BURGDOR18KD IGG SER QL IB: NORMAL
B BURGDOR23KD IGG SER QL IB: NORMAL
B BURGDOR23KD IGM SER QL IB: NORMAL
B BURGDOR28KD IGG SER QL IB: NORMAL
B BURGDOR30KD IGG SER QL IB: NORMAL
B BURGDOR39KD IGG SER QL IB: NORMAL
B BURGDOR39KD IGM SER QL IB: NORMAL
B BURGDOR41KD IGG SER QL IB: NORMAL
B BURGDOR41KD IGM SER QL IB: NORMAL
B BURGDOR45KD IGG SER QL IB: NORMAL
B BURGDOR58KD IGG SER QL IB: NORMAL
B BURGDOR66KD IGG SER QL IB: NORMAL
B BURGDOR93KD IGG SER QL IB: NORMAL

## 2019-03-29 RX ORDER — PSYLLIUM HUSK 0.4 G
1 CAPSULE ORAL DAILY
Qty: 90 TAB | Refills: 3 | Status: SHIPPED | OUTPATIENT
Start: 2019-03-29

## 2019-03-29 NOTE — PROGRESS NOTES
Xray of hips normal, xray spine shows osteopenia, ordering DEXA scan and need to start on Calcium and Vitamin d supplementation, prescribed and sent into pharmacy, we will check these labs in future as well.

## 2019-03-31 ENCOUNTER — TELEPHONE (OUTPATIENT)
Dept: INTERNAL MEDICINE CLINIC | Age: 64
End: 2019-03-31

## 2019-03-31 DIAGNOSIS — M79.2 CHRONIC PERIPHERAL NEUROPATHIC PAIN: Primary | ICD-10-CM

## 2019-03-31 DIAGNOSIS — G89.29 CHRONIC PERIPHERAL NEUROPATHIC PAIN: Primary | ICD-10-CM

## 2019-03-31 DIAGNOSIS — R77.8 ABNORMAL SERUM PROTEIN ELECTROPHORESIS: ICD-10-CM

## 2019-03-31 DIAGNOSIS — M15.9 GENERALIZED OSTEOARTHROSIS, INVOLVING MULTIPLE SITES: ICD-10-CM

## 2019-03-31 DIAGNOSIS — M85.80 OSTEOPENIA AFTER MENOPAUSE: Primary | ICD-10-CM

## 2019-03-31 DIAGNOSIS — Z78.0 OSTEOPENIA AFTER MENOPAUSE: Primary | ICD-10-CM

## 2019-03-31 NOTE — TELEPHONE ENCOUNTER
I received prior records and looks like a dexa scan was completed 7/2017 showing osteopenia- I do not remember us talking about her taking calcium and vitamin D supplementation so if she is not already then I recommend taking at least 1200 of calcium and 800 vitamin d daily.

## 2019-04-01 NOTE — TELEPHONE ENCOUNTER
Patient contacted, patient identified with two identifiers (Name & ). Patient aware of results per NP.MOISES and verbalizes understanding to start taking the recommended vitamins.

## 2019-04-01 NOTE — TELEPHONE ENCOUNTER
Labs negative for lyme, lupus, thyroid disease. B12, magnesium good. Vitamin d mild decrease take OtC therapy of 400 units daily, labs do show prediabetes, and elevated bad cholesterol and triglycerides so limit carbs, sweets. I was investigating your peripheral neuropathy and there is some elevation in one to the labs in the panel but all others are negative so I will refer to heme/onc for further investigation for a complete assessment.

## 2019-04-01 NOTE — TELEPHONE ENCOUNTER
Spoke with patient, she was given message below. Bone density ordered, it was not ordered prior to call.   She verbalized understanding

## 2019-04-01 NOTE — TELEPHONE ENCOUNTER
Patient contacted, patient identified with two identifiers (Name & ). Patient aware of results per NP.MOISES and verbalizes understanding.

## 2019-04-01 NOTE — TELEPHONE ENCOUNTER
----- Message from Roger Vega NP sent at 3/29/2019  5:03 PM EDT -----  Xray of hips normal, xray spine shows osteopenia, ordering DEXA scan and need to start on Calcium and Vitamin d supplementation, prescribed and sent into pharmacy, we will check these labs in future as well.

## 2019-04-10 ENCOUNTER — HOSPITAL ENCOUNTER (OUTPATIENT)
Dept: LAB | Age: 64
Discharge: HOME | End: 2019-04-10
Payer: COMMERCIAL

## 2019-04-10 ENCOUNTER — OFFICE VISIT (OUTPATIENT)
Dept: INTERNAL MEDICINE CLINIC | Age: 64
End: 2019-04-10

## 2019-04-10 VITALS
HEART RATE: 70 BPM | OXYGEN SATURATION: 99 % | SYSTOLIC BLOOD PRESSURE: 147 MMHG | RESPIRATION RATE: 16 BRPM | TEMPERATURE: 98.4 F | WEIGHT: 119 LBS | DIASTOLIC BLOOD PRESSURE: 73 MMHG | BODY MASS INDEX: 23.36 KG/M2 | HEIGHT: 60 IN

## 2019-04-10 DIAGNOSIS — M79.2 CHRONIC PERIPHERAL NEUROPATHIC PAIN: ICD-10-CM

## 2019-04-10 DIAGNOSIS — G89.29 CHRONIC PERIPHERAL NEUROPATHIC PAIN: ICD-10-CM

## 2019-04-10 DIAGNOSIS — R73.03 PREDIABETES: ICD-10-CM

## 2019-04-10 DIAGNOSIS — E78.5 HYPERLIPIDEMIA LDL GOAL <100: Primary | ICD-10-CM

## 2019-04-10 DIAGNOSIS — M25.50 POLYARTHRALGIA: ICD-10-CM

## 2019-04-10 LAB — ERYTHROCYTE [SEDIMENTATION RATE] IN BLOOD: 40 MM/HR (ref 0–30)

## 2019-04-10 PROCEDURE — 86141 C-REACTIVE PROTEIN HS: CPT

## 2019-04-10 PROCEDURE — 85652 RBC SED RATE AUTOMATED: CPT

## 2019-04-10 RX ORDER — ATORVASTATIN CALCIUM 20 MG/1
20 TABLET, FILM COATED ORAL DAILY
Qty: 90 TAB | Refills: 3 | Status: SHIPPED | OUTPATIENT
Start: 2019-04-10 | End: 2019-08-01

## 2019-04-10 RX ORDER — LISINOPRIL AND HYDROCHLOROTHIAZIDE 20; 25 MG/1; MG/1
1 TABLET ORAL DAILY
Qty: 90 TAB | Refills: 3 | Status: SHIPPED | OUTPATIENT
Start: 2019-04-10 | End: 2019-05-21

## 2019-04-10 NOTE — PROGRESS NOTES
German Raygoza is a 61 y.o.  female and presents with    Chief Complaint   Patient presents with    Follow-up     2 week f/u    Hypertension    Neuropathy     Patient reports a pain level today a 5/10       Subjective:  HPI   Ms. Chandra is here to follow up to recent lab results and chronic back pain with sciatica and peripheral neuropathy. We increased the Gabapentin last visit. She reports is sleeping, denies pain waking her, but upon waking she feels pain. Hyperlipidemia unchanged since 9/2017, start on Lipitor. Elevated BP in office, increase HCTZ to 25 mg. Reviewed imaging and labs with patient today. DEXA ordered, Heme/onc scheduled to see tomorrow, and mammogram tomorrow. Ms. Cherri Gamez is single, living in family home. She is anticipating a possible move, unsure her next employment. 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     Mrs. Cherri Gamez has a history of alcoholism 1988, esophagitis, hyperlipidemia, hypertension, situational depression(loss of parents, late  committed suicide), and osteoarthrosis of multiple sites, peripheral neuropathy. She is a former smoker of cigarettes and use of smokeless tobacco quit 2016. +[facility]-mother    She is with hypertension and hyperlipidemia. She is controlled on Lisinopril-HCTZ 20 -12.5mg. She is with prediabetes, 3/2019 A1C 6.1. Hyperlipidemia .8, HDL 42, Trig 156. ASCVD risk is 18.5%. Started on Lipitor 20 mg. She has a history of GERD, not needing Prilosec currently. She reports started smoking cigarettes again started last jan 2018. She quit prior to this, quit \"cold turkey\", failed Chantix, Nicotine patches rash, Wellbutrin \"made me crazy\". She is with history of OA at multiple sites. Chronic bilateral low back pain with sciatica, bilateral hip pain. Xray showed OA. She was seen by [facility] 11/2018 and recommended conservative treatment with PrN followup.  She reports using Ibuprofen 300 mg BID, ice/heat, yoga 3 times a week x 3 months. Prior PCP notes received and shows Dexa Scan complete 7/2017 showing osteopenia. She is not taking Calcium and Vitamin D. 3/2019 Vitamin D 29.6. She is with a history of chronic peripheral neuropathy to BLE. 3/2019- lyme panel negative, Protein electrophoresis normal except for elevation in Beta Globulin level, sent to hem/onc. EMMA negative. Thyroid negative, B12 normal 501, Magnesium normal, A1C 6.1. Hepatitis C negative. Knee to waist if lays on her sides she gets an ache. If lays on her back she reports a pain \"deep inside her butt\". She reports can localize the pain, she can pin point. Joined the [employer]. Pain worse if walking uphill, if walks from one side of [facility] to the other starts to exacerbate. She reports purchased a foam topper for bed. 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 She was lifting her mother. Took care of their yard. She reports they have passed away. She reports increased stress. She has a history of alcoholism and situational depression and insomnia. She has attended AA x 31 years. She was given script to trial Gabapentin 300 mg QHS which she reported failed x 6 month. She is using Melatonin for insomnia. She was using Budesonide for microscopic colitis. This medication is PRN use only and has not need x 2 months. She is followed by Dr. Giuseppe Noel. Mammogram completed 2/2018 negative for malignancy. Pap overdue. Colonoscopy up to date. Tdap up to date. Last Pap 2018, normal, with Dr. Shannan Brown. Additional Concerns: none     ROS   Review of Systems   All other systems reviewed and are negative. Allergies   Allergen Reactions    Celebrex [Celecoxib] Rash       Current Outpatient Medications   Medication Sig Dispense Refill    lisinopril-hydroCHLOROthiazide (PRINZIDE, ZESTORETIC) 20-25 mg per tablet Take 1 Tab by mouth daily. 90 Tab 3    atorvastatin (LIPITOR) 20 mg tablet Take 1 Tab by mouth daily.  90 Tab 3    gabapentin (NEURONTIN) 300 mg capsule Take 2 tablets by mouth at bedtime as needed for pain 60 Cap 3    calcium carbonate-vitamin D3 1,000 mg(2,500 mg)-800 unit tab Take 1 Tab by mouth daily. 90 Tab 3    varicella-zoster recombinant, PF, (SHINGRIX, PF,) 50 mcg/0.5 mL susr injection Inject first dose IM then second dose 4 to 6 months from the first injection 0.5 mL 1    melatonin 10 mg cap Take 10 mg by mouth.  budesonide (ENTOCORT EC) 3 mg capsule Take 3 mg by mouth every morning.          Social History     Socioeconomic History    Marital status:      Spouse name: Not on file    Number of children: Not on file    Years of education: Not on file    Highest education level: Not on file   Occupational History    Not on file   Social Needs    Financial resource strain: Not on file    Food insecurity:     Worry: Not on file     Inability: Not on file    Transportation needs:     Medical: Not on file     Non-medical: Not on file   Tobacco Use    Smoking status: Former Smoker    Smokeless tobacco: Former User     Quit date: 1/9/2016   Substance and Sexual Activity    Alcohol use: No     Alcohol/week: 0.0 oz     Comment: quit 1988    Drug use: No    Sexual activity: Not Currently     Birth control/protection: Surgical     Comment: tubal ligation   Lifestyle    Physical activity:     Days per week: Not on file     Minutes per session: Not on file    Stress: Not on file   Relationships    Social connections:     Talks on phone: Not on file     Gets together: Not on file     Attends Shinto service: Not on file     Active member of club or organization: Not on file     Attends meetings of clubs or organizations: Not on file     Relationship status: Not on file    Intimate partner violence:     Fear of current or ex partner: Not on file     Emotionally abused: Not on file     Physically abused: Not on file     Forced sexual activity: Not on file   Other Topics Concern    Not on file Social History Narrative    Not on file       Past Medical History:   Diagnosis Date    Depressive disorder, not elsewhere classified     Esophagitis, unspecified     Essential hypertension, benign     Generalized osteoarthrosis, involving multiple sites     Mixed hyperlipidemia     Personal history of alcoholism ([facility] [address]        Past Surgical History:   Procedure Laterality Date    HX CHOLECYSTECTOMY      HX COLONOSCOPY  9/2010; 4/2018    (Claudine Garcianolan) diverticuli; 2nd colo showed polyp/lymphocytic colitis    HX TONSIL AND ADENOIDECTOMY      HX TUBAL LIGATION Bilateral        Family History   Problem Relation Age of Onset    Arthritis-osteo Mother     Ulcerative Colitis Mother     Breast Cancer Mother         breast, age70s    Heart Disease Father         chf    Hypertension Brother     Diabetes Brother     Elevated Lipids Brother     No Known Problems Brother     No Known Problems Brother        Objective:  Vitals:    04/10/19 0956   BP: 147/73   Pulse: 70   Resp: 16   Temp: 98.4 °F (36.9 °C)   TempSrc: Oral   SpO2: 99%   Weight: 119 lb (54 kg)   Height: 5' (1.524 m)   PainSc:   5   PainLoc: Generalized       LABS   Results for orders placed or performed during the hospital encounter of 03/26/19   CBC WITH AUTOMATED DIFF   Result Value Ref Range    WBC 7.8 4.6 - 13.2 K/uL    RBC 4.86 4.20 - 5.30 M/uL    HGB 15.0 12.0 - 16.0 g/dL    HCT 45.6 (H) 35.0 - 45.0 %    MCV 93.8 74.0 - 97.0 FL    MCH 30.9 24.0 - 34.0 PG    MCHC 32.9 31.0 - 37.0 g/dL    RDW 13.8 11.6 - 14.5 %    PLATELET 139 995 - 572 K/uL    MPV 12.0 (H) 9.2 - 11.8 FL    NEUTROPHILS 71 40 - 73 %    LYMPHOCYTES 24 21 - 52 %    MONOCYTES 4 3 - 10 %    EOSINOPHILS 1 0 - 5 %    BASOPHILS 0 0 - 2 %    ABS. NEUTROPHILS 5.6 1.8 - 8.0 K/UL    ABS. LYMPHOCYTES 1.8 0.9 - 3.6 K/UL    ABS. MONOCYTES 0.3 0.05 - 1.2 K/UL    ABS. EOSINOPHILS 0.1 0.0 - 0.4 K/UL    ABS.  BASOPHILS 0.0 0.0 - 0.1 K/UL    DF AUTOMATED     METABOLIC PANEL, COMPREHENSIVE   Result Value Ref Range    Sodium 139 136 - 145 mmol/L    Potassium 4.2 3.5 - 5.5 mmol/L    Chloride 104 100 - 108 mmol/L    CO2 27 21 - 32 mmol/L    Anion gap 8 3.0 - 18 mmol/L    Glucose 92 74 - 99 mg/dL    BUN 15 7.0 - 18 MG/DL    Creatinine 0.68 0.6 - 1.3 MG/DL    BUN/Creatinine ratio 22 (H) 12 - 20      GFR est AA >60 >60 ml/min/1.73m2    GFR est non-AA >60 >60 ml/min/1.73m2    Calcium 8.8 8.5 - 10.1 MG/DL    Bilirubin, total 0.3 0.2 - 1.0 MG/DL    ALT (SGPT) 24 13 - 56 U/L    AST (SGOT) 22 15 - 37 U/L    Alk. phosphatase 107 45 - 117 U/L    Protein, total 7.9 6.4 - 8.2 g/dL    Albumin 4.2 3.4 - 5.0 g/dL    Globulin 3.7 2.0 - 4.0 g/dL    A-G Ratio 1.1 0.8 - 1.7     LIPID PANEL   Result Value Ref Range    LIPID PROFILE          Cholesterol, total 230 (H) <200 MG/DL    Triglyceride 156 (H) <150 MG/DL    HDL Cholesterol 42 40 - 60 MG/DL    LDL, calculated 156.8 (H) 0 - 100 MG/DL    VLDL, calculated 31.2 MG/DL    CHOL/HDL Ratio 5.5 (H) 0 - 5.0     HEMOGLOBIN A1C WITH EAG   Result Value Ref Range    Hemoglobin A1c 6.1 (H) 4.2 - 5.6 %    Est. average glucose 128 mg/dL   URINALYSIS W/ RFLX MICROSCOPIC   Result Value Ref Range    Color YELLOW      Appearance CLEAR      Specific gravity 1.009 1.005 - 1.030      pH (UA) 6.5 5.0 - 8.0      Protein NEGATIVE  NEG mg/dL    Glucose NEGATIVE  NEG mg/dL    Ketone NEGATIVE  NEG mg/dL    Bilirubin NEGATIVE  NEG      Blood NEGATIVE  NEG      Urobilinogen 0.2 0.2 - 1.0 EU/dL    Nitrites NEGATIVE  NEG      Leukocyte Esterase SMALL (A) NEG     HEPATITIS C AB   Result Value Ref Range    Hepatitis C virus Ab <0.02 <0.80 Index    Hep C  virus Ab Interp.  NEGATIVE  NEG      Hep C  virus Ab comment         VITAMIN D, 25 HYDROXY   Result Value Ref Range    Vitamin D 25-Hydroxy 29.6 (L) 30 - 100 ng/mL   VITAMIN B12   Result Value Ref Range    Vitamin B12 501 211 - 911 pg/mL   MAGNESIUM   Result Value Ref Range    Magnesium 2.0 1.6 - 2.6 mg/dL   TSH 3RD GENERATION   Result Value Ref Range    TSH 1.50 0.36 - 3.74 uIU/mL   T4, FREE   Result Value Ref Range    T4, Free 1.0 0.7 - 1.5 NG/DL   LYME AB, IGG & IGM BY WB   Result Value Ref Range    IgG P93 Ab Absent     IgG P66 Ab Absent     IgG P58 Ab Absent     IgG P45 Ab Absent     IgG P41 Ab Absent     IgG P39 Ab Absent     IgG P30 Ab Absent     IgG P28 Ab Absent     IgG P23 Ab Absent     IgG P18 Ab Absent     Lyme Ab, IgG WB Interp. NEGATIVE       IgM P41 Ab Absent     IgM P39 Ab Absent     IgM P23 Ab Absent     Lyme Ab, IgM WB Interp. NEGATIVE      ANTINUCLEAR ANTIBODIES, IFA   Result Value Ref Range    Antinuclear Abs, IFA NEGATIVE      PROTEIN ELECTROPHORESIS   Result Value Ref Range    Protein, total 7.7 6.0 - 8.5 g/dL    Albumin 3.9 2.9 - 4.4 g/dL    Alpha-1-globulin 0.2 0.0 - 0.4 g/dL    ALPHA-2 GLOBULIN 0.9 0.4 - 1.0 g/dL    Beta globulin 1.5 (H) 0.7 - 1.3 g/dL    Gamma globulin 1.1 0.4 - 1.8 g/dL    M-Marcell Not Observed Not Observed g/dL    Globulin, total 3.8 2.2 - 3.9 g/dL    A/G ratio 1.0 0.7 - 1.7     URINE MICROSCOPIC ONLY   Result Value Ref Range    WBC 3 to 5 0 - 4 /hpf    RBC 0 0 - 5 /hpf    Epithelial cells FEW 0 - 5 /lpf    Bacteria NEGATIVE  NEG /hpf       TESTS  LATRICIA Results (most recent):  Results from Hospital Encounter encounter on 02/15/18   [facility] 3D [place] W MAMMO BI SCREENING INCL CAD    Narrative Digital Screening Mammogram with Tomosynthesis    History: Screening. Comparison: 2012, 2013, 2015, 2016    Technique: Digital mammography (2D and 3D Tomosynthesis) with CAD was performed. Routine views obtained. Findings: There are scattered, morphologically benign calcifications bilaterally which  demonstrate multiyear stability. No developing masses or suspicious  calcifications are seen. Impression Impression:    No evidence for malignancy. Suggest routine follow-up with annual mammographic screening. BIRADS 2 : Benign    The breasts are heterogenously dense, which may obscure small masses.     Thank you for this referral. PE  Physical Exam   Constitutional: She is oriented to person, place, and time. She appears well-developed and well-nourished. No distress. HENT:   Head: Normocephalic and atraumatic. Cardiovascular: Normal rate, regular rhythm, normal heart sounds and intact distal pulses. Pulmonary/Chest: Effort normal and breath sounds normal.   Musculoskeletal: Normal range of motion. Neurological: She is alert and oriented to person, place, and time. Skin: Skin is warm and dry. She is not diaphoretic. Psychiatric: She has a normal mood and affect. Her behavior is normal. Judgment and thought content normal.   Vitals reviewed. Assessment/Plan:    1. Chronic peripheral neuropathy BLE to feet > 20 years- labs ordered. She is unsure if had EMG in the past. Possible relationship to back pain which is also chronic. Increase Gabapentin to 600 mg QHS-providing improvement. Protein electrophoresis with elevated beta globulin- heme/onc ordered, sched for tomorrow. 2. Chronic low back pain midline with sciatica that is reproducible, Bilateral mid gluteal pain that is reproducible and localized with radiation to sciatica-  Xrays ordered, labs ordered to include lymes as she is a hiker with hx of multiple tick bites. I feel the mid gluteal pain is Piriformis Syndrome, handouts provided, encouraged to continue Yoga, water therapy- she attends the [facility], [facility], [facility] etc, massage, ice. Increased Gabapentin to 600 mg QHS, plan of care to adjust depending on results. DEXA ordered. ESR did not result, CRP ordered, she is worried about PMR- I have low suspicion. She question starting Tramadol however I would prefer to complete workup prior to starting. 3. Hyperlipidemia- start Lipitor 20 mg daily. F/u 3 months. 4. Hypertension- Increase HCTZ to 25 mg, Lisinopril 20 mg/HCTZ 25 mg. F/u 3 months.      Lab review: orders written for new lab studies as appropriate; see orders    Today's Visit:   Diagnoses and all orders for this visit:    1. Hyperlipidemia LDL goal <100    2. Prediabetes    3. Chronic peripheral neuropathic pain  -     CRP, HIGH SENSITIVITY; Future    4. Polyarthralgia  -     CRP, HIGH SENSITIVITY; Future    Other orders  -     lisinopril-hydroCHLOROthiazide (PRINZIDE, ZESTORETIC) 20-25 mg per tablet; Take 1 Tab by mouth daily. -     atorvastatin (LIPITOR) 20 mg tablet; Take 1 Tab by mouth daily. Health Maintenance: Mammogram ordered. Shingrix script sent. Harvey up to date. Pap up to date. I need records for pap with Dr. Felix Og and she thinks they also performed the bone scan. I have discussed the diagnosis with the patient and the intended plan as seen in the above orders. The patient has received an after-visit summary and questions were answered concerning future plans. I have discussed medication side effects and warnings with the patient as well. I have reviewed the plan of care with the patient, accepted their input and they are in agreement with the treatment goals. More than 1/2 of this 30 minute visit was spent in counseling and coordination of care, as described above.     SAGAR Castle  Internist of [address], [address] [place] [address].  Phone: [phone]  Fax: [phone]

## 2019-04-10 NOTE — PROGRESS NOTES
[place] presents today for   Chief Complaint   Patient presents with    Follow-up     2 week f/u    Hypertension    Neuropathy     Patient reports a pain level today a 5/10              Depression Screening:  3 most recent PHQ Screens 3/26/2019   Little interest or pleasure in doing things Not at all   Feeling down, depressed, irritable, or hopeless Not at all   Total Score PHQ 2 0       Learning Assessment:  Learning Assessment 9/24/2015   PRIMARY LEARNER Patient   HIGHEST LEVEL OF EDUCATION - PRIMARY LEARNER  SOME COLLEGE   BARRIERS PRIMARY LEARNER NONE   CO-LEARNER CAREGIVER No   PRIMARY LANGUAGE ENGLISH   LEARNER PREFERENCE PRIMARY DEMONSTRATION   ANSWERED BY patient   RELATIONSHIP SELF       Abuse Screening:  Abuse Screening Questionnaire 3/26/2019   Do you ever feel afraid of your partner? N   Are you in a relationship with someone who physically or mentally threatens you? N   Is it safe for you to go home? Y       Fall Risk  Fall Risk Assessment, last 12 mths 3/26/2019   Able to walk? Yes   Fall in past 12 months? No           Coordination of Care:  1. Have you been to the ER, urgent care clinic since your last visit? Hospitalized since your last visit? no    2. Have you seen or consulted any other health care providers outside of the [address] [place] [address] since your last visit? Include any pap smears or colon screening.  no

## 2019-04-11 ENCOUNTER — TELEPHONE (OUTPATIENT)
Dept: INTERNAL MEDICINE CLINIC | Age: 64
End: 2019-04-11

## 2019-04-11 ENCOUNTER — HOSPITAL ENCOUNTER (OUTPATIENT)
Dept: MAMMOGRAPHY | Age: 64
Discharge: HOME | End: 2019-04-11
Attending: NURSE PRACTITIONER
Payer: COMMERCIAL

## 2019-04-11 DIAGNOSIS — Z12.39 SCREENING BREAST EXAMINATION: ICD-10-CM

## 2019-04-11 LAB — CRP SERPL HS-MCNC: 2.21 MG/L (ref 0–3)

## 2019-04-11 PROCEDURE — 77067 SCR MAMMO BI INCL CAD: CPT

## 2019-04-11 NOTE — TELEPHONE ENCOUNTER
Chief Complaint   Patient presents with    Labs     done 04-10-19 per NP Som Correia     04-11-19 left voice message to return call for results. I did reach the patient, and results given. The patient has no questions at this time, and understands all.

## 2019-04-11 NOTE — TELEPHONE ENCOUNTER
----- Message from Ashwin He NP sent at 4/11/2019  8:59 AM EDT -----  Sed rate was elevated however the C reactive protein marker was not and is more specific so no further indication to proceed with further workup at this time regarding PMR.

## 2019-04-11 NOTE — PROGRESS NOTES
Sed rate was elevated however the C reactive protein marker was not and is more specific so no further indication to proceed with further workup at this time regarding PMR.

## 2019-05-09 ENCOUNTER — TELEPHONE (OUTPATIENT)
Dept: INTERNAL MEDICINE CLINIC | Age: 64
End: 2019-05-09

## 2019-05-09 NOTE — TELEPHONE ENCOUNTER
Called and spoke with patient gave her the message below. She stated she was going to contact Hematology/Oncology and will let our office know if she needs anything further.

## 2019-05-09 NOTE — TELEPHONE ENCOUNTER
Please inform her that I sent her to hematology/oncology because while investigating her peripheral neuropathy one of the labs that I persued returned abnormal so I need specialty to continue work up as it is now beyond my scope of practice.

## 2019-05-09 NOTE — TELEPHONE ENCOUNTER
Pt calling asking a nurse to call her. Says she was sent to oncology by [facility]. Says the tests they are ordering are being denied by insurance saying she needed a chest xray first. Says she is confused on why we sent her to oncology in the first place. Please call.

## 2019-05-20 ENCOUNTER — TELEPHONE (OUTPATIENT)
Dept: INTERNAL MEDICINE CLINIC | Age: 64
End: 2019-05-20

## 2019-05-20 NOTE — TELEPHONE ENCOUNTER
BP med was changed about 1 month ago    bp  1115/71  110/70  101/70    Been feeling dizzy, not feeling good in general, lightheaded

## 2019-05-21 ENCOUNTER — OFFICE VISIT (OUTPATIENT)
Dept: INTERNAL MEDICINE CLINIC | Age: 64
End: 2019-05-21

## 2019-05-21 VITALS
BODY MASS INDEX: 23.16 KG/M2 | RESPIRATION RATE: 14 BRPM | TEMPERATURE: 98.3 F | HEART RATE: 70 BPM | WEIGHT: 118 LBS | DIASTOLIC BLOOD PRESSURE: 68 MMHG | OXYGEN SATURATION: 98 % | SYSTOLIC BLOOD PRESSURE: 124 MMHG | HEIGHT: 60 IN

## 2019-05-21 DIAGNOSIS — I10 ESSENTIAL HYPERTENSION, BENIGN: ICD-10-CM

## 2019-05-21 DIAGNOSIS — Z72.0 TOBACCO ABUSE: ICD-10-CM

## 2019-05-21 DIAGNOSIS — R06.02 SHORTNESS OF BREATH: Primary | ICD-10-CM

## 2019-05-21 RX ORDER — LISINOPRIL AND HYDROCHLOROTHIAZIDE 12.5; 2 MG/1; MG/1
1 TABLET ORAL DAILY
Qty: 90 TAB | Refills: 3 | Status: SHIPPED | OUTPATIENT
Start: 2019-05-21 | End: 2020-05-06 | Stop reason: SDUPTHER

## 2019-05-21 RX ORDER — LISINOPRIL AND HYDROCHLOROTHIAZIDE 12.5; 2 MG/1; MG/1
TABLET ORAL DAILY
COMMUNITY
End: 2019-05-21

## 2019-05-21 RX ORDER — ALBUTEROL SULFATE 90 UG/1
1-2 AEROSOL, METERED RESPIRATORY (INHALATION)
Qty: 1 INHALER | Refills: 1 | Status: SHIPPED | OUTPATIENT
Start: 2019-05-21

## 2019-05-21 NOTE — PROGRESS NOTES
Sendy Mendez is a 59 y.o.  female and presents with    Chief Complaint   Patient presents with    Hypertension     patient reports feeling as if she is in a fog since last appointment, reports lisinopril-HCTZ was increased at last visit and she was started on lipitor       Subjective:  HPI  Ms. Chandra is here to follow up on hypertension. She reports went back to taking Lisinopril/HCTZ 12.5 mg, as we did increase the HCTZ to 25 mg. Has been to lower dose since last Thursday. Home 101/53, 118/60. Added Lipitor 20 mg daily. We also increased Gabapentin from 300 to 600 mg 3/2019. Follow up to Gabapentin increase was without symptoms in the past.    She reports mental fog, fatigue, dizziness with positional changes. Denies joint pain, muscle aches and cramping. But did state a pain to left shin that was new. Ms. Tia Chavez is single, living in family home. She is anticipating a possible move, unsure her next employment. 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     Mrs. Tia Chavez has a history of alcoholism 1988, esophagitis, hyperlipidemia, hypertension, situational depression(loss of parents, late  committed suicide), and osteoarthrosis of multiple sites, peripheral neuropathy. She is a former smoker of cigarettes and use of smokeless tobacco quit 2016. +[facility] breast cancer-mother    She is with hypertension and hyperlipidemia. She is controlled on Lisinopril-HCTZ 20 -12.5mg. She is with prediabetes, 3/2019 A1C 6.1. Hyperlipidemia .8, HDL 42, Trig 156. ASCVD risk is 18.5%. Started on Lipitor 20 mg. She has a history of GERD, not needing Prilosec currently. She reports started smoking cigarettes again started last jan 2018. She quit prior to this, quit \"cold turkey\", failed Chantix, Nicotine patches rash, Wellbutrin \"made me crazy\". She is with history of OA at multiple sites. Chronic bilateral low back pain with sciatica, bilateral hip pain. Xray showed OA. She was seen by [facility] 11/2018 and recommended conservative treatment with PrN followup. She reports using Ibuprofen 300 mg BID, ice/heat, yoga 3 times a week x 3 months. Prior PCP notes received and shows Dexa Scan complete 7/2017 showing osteopenia. She is not taking Calcium and Vitamin D. 3/2019 Vitamin D 29.6. She is with a history of chronic peripheral neuropathy to BLE. 3/2019- lyme panel negative, Protein electrophoresis normal except for elevation in Beta Globulin level, sent to hem/onc. EMMA negative. Thyroid negative, B12 normal 501, Magnesium normal, A1C 6.1. Hepatitis C negative. Knee to waist if lays on her sides she gets an ache. If lays on her back she reports a pain \"deep inside her butt\". She reports can localize the pain, she can pin point. Joined the [employer]. Pain worse if walking uphill, if walks from one side of [facility] to the other starts to exacerbate. She reports purchased a foam topper for bed. 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 She was lifting her mother. Took care of their yard. She reports they have passed away. She reports increased stress. She has a history of alcoholism and situational depression and insomnia. She has attended AA x 31 years. She was given script to trial Gabapentin 300 mg QHS which she reported failed x 6 month. She is using Melatonin for insomnia. She was using Budesonide for microscopic colitis. This medication is PRN use only and has not need x 2 months. She is followed by Dr. Vincent Gabriel. Mammogram completed 2/2018 negative for malignancy. Pap overdue. Colonoscopy up to date. Tdap up to date. Last Pap 2018, normal, with Dr. Paty Arreguin. Additional Concerns: none     ROS   Review of Systems   All other systems reviewed and are negative.       Allergies   Allergen Reactions    Celebrex [Celecoxib] Rash       Current Outpatient Medications   Medication Sig Dispense Refill    albuterol (PROVENTIL HFA, VENTOLIN HFA, PROAIR HFA) 90 mcg/actuation inhaler Take 1-2 Puffs by inhalation every four (4) hours as needed for Wheezing. 1 Inhaler 1    lisinopril-hydroCHLOROthiazide (PRINZIDE, ZESTORETIC) 20-12.5 mg per tablet Take 1 Tab by mouth daily. 90 Tab 3    atorvastatin (LIPITOR) 20 mg tablet Take 1 Tab by mouth daily. 90 Tab 3    calcium carbonate-vitamin D3 1,000 mg(2,500 mg)-800 unit tab Take 1 Tab by mouth daily. 90 Tab 3    varicella-zoster recombinant, PF, (SHINGRIX, PF,) 50 mcg/0.5 mL susr injection Inject first dose IM then second dose 4 to 6 months from the first injection 0.5 mL 1    melatonin 10 mg cap Take 10 mg by mouth.  gabapentin (NEURONTIN) 300 mg capsule Take 2 tablets by mouth at bedtime as needed for pain 60 Cap 3    budesonide (ENTOCORT EC) 3 mg capsule Take 3 mg by mouth every morning.          Social History     Socioeconomic History    Marital status:      Spouse name: Not on file    Number of children: Not on file    Years of education: Not on file    Highest education level: Not on file   Occupational History    Not on file   Social Needs    Financial resource strain: Not on file    Food insecurity:     Worry: Not on file     Inability: Not on file    Transportation needs:     Medical: Not on file     Non-medical: Not on file   Tobacco Use    Smoking status: Former Smoker    Smokeless tobacco: Former User     Quit date: 1/9/2016   Substance and Sexual Activity    Alcohol use: No     Alcohol/week: 0.0 oz     Comment: quit 1988    Drug use: No    Sexual activity: Not Currently     Birth control/protection: Surgical     Comment: tubal ligation   Lifestyle    Physical activity:     Days per week: Not on file     Minutes per session: Not on file    Stress: Not on file   Relationships    Social connections:     Talks on phone: Not on file     Gets together: Not on file     Attends Holiness service: Not on file     Active member of club or organization: Not on file     Attends meetings of clubs or organizations: Not on file     Relationship status: Not on file    Intimate partner violence:     Fear of current or ex partner: Not on file     Emotionally abused: Not on file     Physically abused: Not on file     Forced sexual activity: Not on file   Other Topics Concern    Not on file   Social History Narrative    Not on file       Past Medical History:   Diagnosis Date    Depressive disorder, not elsewhere classified     Esophagitis, unspecified     Essential hypertension, benign     Generalized osteoarthrosis, involving multiple sites     Mixed hyperlipidemia     Personal history of alcoholism ([facility] [address]        Past Surgical History:   Procedure Laterality Date    HX CHOLECYSTECTOMY      HX COLONOSCOPY  9/2010; 4/2018    (Hernandez Due) diverticuli; 2nd colo showed polyp/lymphocytic colitis    HX TONSIL AND ADENOIDECTOMY      HX TUBAL LIGATION Bilateral        Family History   Problem Relation Age of Onset    Arthritis-osteo Mother     Ulcerative Colitis Mother     Breast Cancer Mother         breast, age70s    Heart Disease Father         chf    Hypertension Brother     Diabetes Brother     Elevated Lipids Brother     No Known Problems Brother     No Known Problems Brother        Objective:  Vitals:    05/21/19 1207   BP: 124/68   Pulse: 70   Resp: 14   Temp: 98.3 °F (36.8 °C)   TempSrc: Oral   SpO2: 98%   Weight: 118 lb (53.5 kg)   Height: 5' (1.524 m)   PainSc:   5   PainLoc: Buttocks       LABS   Results for orders placed or performed during the hospital encounter of 04/10/19   CRP, HIGH SENSITIVITY   Result Value Ref Range    C-Reactive Protein, Cardiac 2.21 0.00 - 3.00 mg/L   SED RATE (ESR)   Result Value Ref Range    Sed rate, automated 40 (H) 0 - 30 mm/hr       TESTS  none    PE  Physical Exam   Constitutional: She is oriented to person, place, and time. She appears well-developed and well-nourished. No distress. HENT:   Head: Normocephalic and atraumatic.    Cardiovascular: Normal rate, regular rhythm, normal heart sounds and intact distal pulses. Pulmonary/Chest: Effort normal and breath sounds normal. No respiratory distress. She has no wheezes. She has no rales. She exhibits no tenderness. Musculoskeletal: Normal range of motion. Neurological: She is alert and oriented to person, place, and time. Skin: Skin is warm and dry. She is not diaphoretic. Psychiatric: She has a normal mood and affect. Her behavior is normal. Judgment and thought content normal.   Vitals reviewed. Assessment/Plan:    1. Htn-- Lisinopril 20 mg/HCTZ 12.5 mg continue, refilled. 2. Sciatica chronic- continue Gabapentin at 600 mg.    3. Hyperlipidemia- Hold Lipitor x 2 weeks and report symptoms if mental fog and pain resolves. Lab review: no lab studies available for review at time of visit    Today's Visit:   Diagnoses and all orders for this visit:    1. Shortness of breath  -     albuterol (PROVENTIL HFA, VENTOLIN HFA, PROAIR HFA) 90 mcg/actuation inhaler; Take 1-2 Puffs by inhalation every four (4) hours as needed for Wheezing. 2. Tobacco abuse  -     albuterol (PROVENTIL HFA, VENTOLIN HFA, PROAIR HFA) 90 mcg/actuation inhaler; Take 1-2 Puffs by inhalation every four (4) hours as needed for Wheezing. 3. Essential hypertension, benign  -     lisinopril-hydroCHLOROthiazide (PRINZIDE, ZESTORETIC) 20-12.5 mg per tablet; Take 1 Tab by mouth daily. Health Maintenance: To be addressed in future. I have discussed the diagnosis with the patient and the intended plan as seen in the above orders. The patient has received an after-visit summary and questions were answered concerning future plans. I have discussed medication side effects and warnings with the patient as well. I have reviewed the plan of care with the patient, accepted their input and they are in agreement with the treatment goals.           More than 1/2 of this 15 minute visit was spent in counseling and coordination of care, as described above.     SAGAR Gomez  Internist of [address] [place] [address], [facility] [address]  Phone: [phone]  Fax: [phone]

## 2019-05-21 NOTE — PROGRESS NOTES
Liza Hernández presents today for   Chief Complaint   Patient presents with    Hypertension     patient reports feeling as if she is in a fog since last appointment, reports lisinopril-HCTZ was increased at last visit and she was started on lipitor              Depression Screening:  3 most recent PHQ Screens 3/26/2019   Little interest or pleasure in doing things Not at all   Feeling down, depressed, irritable, or hopeless Not at all   Total Score PHQ 2 0       Learning Assessment:  Learning Assessment 9/24/2015   PRIMARY LEARNER Patient   HIGHEST LEVEL OF EDUCATION - PRIMARY LEARNER  SOME COLLEGE   BARRIERS PRIMARY LEARNER NONE   CO-LEARNER CAREGIVER No   PRIMARY LANGUAGE ENGLISH   LEARNER PREFERENCE PRIMARY DEMONSTRATION   ANSWERED BY patient   RELATIONSHIP SELF       Abuse Screening:  Abuse Screening Questionnaire 3/26/2019   Do you ever feel afraid of your partner? N   Are you in a relationship with someone who physically or mentally threatens you? N   Is it safe for you to go home? Y       Fall Risk  Fall Risk Assessment, last 12 mths 3/26/2019   Able to walk? Yes   Fall in past 12 months? No           Coordination of Care:  1. Have you been to the ER, urgent care clinic since your last visit? no   Hospitalized since your last visit? no    2. Have you seen or consulted any other health care providers outside of the [address] [place] [address] since your last visit? Include any pap smears or colon screening.  no

## 2019-06-14 ENCOUNTER — TELEPHONE (OUTPATIENT)
Dept: INTERNAL MEDICINE CLINIC | Age: 64
End: 2019-06-14

## 2019-06-14 NOTE — TELEPHONE ENCOUNTER
----- Message from Roshni Tamez sent at 6/14/2019  2:11 PM EDT -----  Regarding: FW: Patient requesting to transfer to Dr. Quinones Farrah: [phone]  Please call Ms. Chandra and schedule her for a future appointment with Dr. William Christianson. Thanks,  P      ----- Message -----  From: Olga Stahl MD  Sent: 6/14/2019  12:17 PM  To: Roshni Tamez, [facility]  Subject: RE: Patient requesting to transfer to Dr. Tanja Kim    I spoke with Apryl Dc and would be willing to accept this patient. Please schedule. Thank you!    ----- Message -----  From: Chrissy Wilson  Sent: 6/11/2019  11:06 AM  To: Gautam Garcia MD, [facility]  Subject: Patient requesting to transfer to Dr. William Christianson     Patient called because she received letter stating that Ms. Romaine France is leaving the practice. Patient says Dr. William Christianson came highly recommended by a current patient and would like to know if Mukesh Hatch is willing to take her on as a patient. Patient is aware Dr. William Christianson is out of the office until Friday and that we will reach back out to her once we hear back from her.      Thanks,   Joyce Pinedas

## 2019-07-19 ENCOUNTER — TELEPHONE (OUTPATIENT)
Dept: INTERNAL MEDICINE CLINIC | Age: 64
End: 2019-07-19

## 2019-07-19 NOTE — TELEPHONE ENCOUNTER
Pt is a former pt of Aleyda's she is sched to see Dr Susanne Gutierrez 10/01- she is having issues with her back, and says she needs to be seen before that date .

## 2019-08-01 ENCOUNTER — OFFICE VISIT (OUTPATIENT)
Dept: INTERNAL MEDICINE CLINIC | Age: 64
End: 2019-08-01

## 2019-08-01 ENCOUNTER — HOSPITAL ENCOUNTER (OUTPATIENT)
Dept: LAB | Age: 64
Discharge: HOME | End: 2019-08-01
Payer: MEDICAID

## 2019-08-01 DIAGNOSIS — I10 ESSENTIAL HYPERTENSION: Primary | ICD-10-CM

## 2019-08-01 DIAGNOSIS — M79.2 CHRONIC PERIPHERAL NEUROPATHIC PAIN: ICD-10-CM

## 2019-08-01 DIAGNOSIS — E78.5 HYPERLIPIDEMIA, UNSPECIFIED HYPERLIPIDEMIA TYPE: ICD-10-CM

## 2019-08-01 DIAGNOSIS — K52.832 LYMPHOCYTIC COLITIS: ICD-10-CM

## 2019-08-01 DIAGNOSIS — F10.21 PERSONAL HISTORY OF ALCOHOLISM (HCC): ICD-10-CM

## 2019-08-01 DIAGNOSIS — R73.03 PREDIABETES: ICD-10-CM

## 2019-08-01 DIAGNOSIS — M15.9 GENERALIZED OSTEOARTHROSIS, INVOLVING MULTIPLE SITES: ICD-10-CM

## 2019-08-01 DIAGNOSIS — G89.29 CHRONIC BILATERAL LOW BACK PAIN WITH BILATERAL SCIATICA: ICD-10-CM

## 2019-08-01 DIAGNOSIS — K21.9 GASTROESOPHAGEAL REFLUX DISEASE, ESOPHAGITIS PRESENCE NOT SPECIFIED: ICD-10-CM

## 2019-08-01 DIAGNOSIS — E55.9 VITAMIN D DEFICIENCY: ICD-10-CM

## 2019-08-01 DIAGNOSIS — G89.29 CHRONIC PERIPHERAL NEUROPATHIC PAIN: ICD-10-CM

## 2019-08-01 DIAGNOSIS — M54.42 CHRONIC BILATERAL LOW BACK PAIN WITH BILATERAL SCIATICA: ICD-10-CM

## 2019-08-01 DIAGNOSIS — G62.9 PERIPHERAL POLYNEUROPATHY: ICD-10-CM

## 2019-08-01 DIAGNOSIS — M85.89 OSTEOPENIA OF MULTIPLE SITES: ICD-10-CM

## 2019-08-01 DIAGNOSIS — I10 ESSENTIAL HYPERTENSION, BENIGN: ICD-10-CM

## 2019-08-01 DIAGNOSIS — F17.200 CURRENT EVERY DAY SMOKER: ICD-10-CM

## 2019-08-01 DIAGNOSIS — M54.41 CHRONIC BILATERAL LOW BACK PAIN WITH BILATERAL SCIATICA: ICD-10-CM

## 2019-08-01 LAB
ALBUMIN SERPL-MCNC: 4.5 G/DL (ref 3.4–5)
ALBUMIN/GLOB SERPL: 1.5 {RATIO} (ref 0.8–1.7)
ALP SERPL-CCNC: 91 U/L (ref 45–117)
ALT SERPL-CCNC: 22 U/L (ref 13–56)
ANION GAP SERPL CALC-SCNC: 5 MMOL/L (ref 3–18)
AST SERPL-CCNC: 18 U/L (ref 10–38)
BILIRUB SERPL-MCNC: 0.3 MG/DL (ref 0.2–1)
BUN SERPL-MCNC: 19 MG/DL (ref 7–18)
BUN/CREAT SERPL: 26 (ref 12–20)
CALCIUM SERPL-MCNC: 9.3 MG/DL (ref 8.5–10.1)
CHLORIDE SERPL-SCNC: 106 MMOL/L (ref 100–111)
CHOLEST SERPL-MCNC: 229 MG/DL
CO2 SERPL-SCNC: 28 MMOL/L (ref 21–32)
CREAT SERPL-MCNC: 0.74 MG/DL (ref 0.6–1.3)
EST. AVERAGE GLUCOSE BLD GHB EST-MCNC: 120 MG/DL
GLOBULIN SER CALC-MCNC: 3 G/DL (ref 2–4)
GLUCOSE SERPL-MCNC: 105 MG/DL (ref 74–99)
HBA1C MFR BLD: 5.8 % (ref 4.2–5.6)
HDLC SERPL-MCNC: 41 MG/DL (ref 40–60)
HDLC SERPL: 5.6 {RATIO} (ref 0–5)
LDLC SERPL CALC-MCNC: 158.2 MG/DL (ref 0–100)
LIPID PROFILE,FLP: ABNORMAL
MAGNESIUM SERPL-MCNC: 2.2 MG/DL (ref 1.6–2.6)
POTASSIUM SERPL-SCNC: 4.1 MMOL/L (ref 3.5–5.5)
PROT SERPL-MCNC: 7.5 G/DL (ref 6.4–8.2)
SODIUM SERPL-SCNC: 139 MMOL/L (ref 136–145)
TRIGL SERPL-MCNC: 149 MG/DL (ref ?–150)
VLDLC SERPL CALC-MCNC: 29.8 MG/DL

## 2019-08-01 PROCEDURE — 83735 ASSAY OF MAGNESIUM: CPT

## 2019-08-01 PROCEDURE — 83036 HEMOGLOBIN GLYCOSYLATED A1C: CPT

## 2019-08-01 PROCEDURE — 80053 COMPREHEN METABOLIC PANEL: CPT

## 2019-08-01 PROCEDURE — 82306 VITAMIN D 25 HYDROXY: CPT

## 2019-08-01 PROCEDURE — 80061 LIPID PANEL: CPT

## 2019-08-01 RX ORDER — ATORVASTATIN CALCIUM 10 MG/1
10 TABLET, FILM COATED ORAL DAILY
Qty: 90 TAB | Refills: 2 | Status: SHIPPED | OUTPATIENT
Start: 2019-08-01 | End: 2020-04-21 | Stop reason: SDUPTHER

## 2019-08-01 RX ORDER — DICLOFENAC SODIUM 50 MG/1
50 TABLET, DELAYED RELEASE ORAL 3 TIMES DAILY
Qty: 90 TAB | Refills: 3 | Status: SHIPPED | OUTPATIENT
Start: 2019-08-01 | End: 2019-10-08

## 2019-08-01 NOTE — PROGRESS NOTES
Chief Complaint   Patient presents with   [address] [place] [address] patient present to establish care with a new PCP, Dr. Nima Young. Health Maintenance Due   Topic Date Due    Shingrix Vaccine Age 49> (2 of 2) 05/21/2019    Influenza Age 5 to Adult  08/01/2019     1. Have you been to the ER, urgent care clinic or hospitalized since your last visit? NO.     2. Have you seen or consulted any other health care providers outside of the [address] [place] [address] since your last visit (Include any pap smears or colon screening)? NO      Do you have an Advanced Directive? YES, patient states she will bring it in soon. .    Learning Assessment 8/1/2019   PRIMARY LEARNER Patient   HIGHEST LEVEL OF EDUCATION - PRIMARY LEARNER  SOME COLLEGE   BARRIERS PRIMARY LEARNER NONE   CO-LEARNER CAREGIVER No   PRIMARY LANGUAGE ENGLISH   LEARNER PREFERENCE PRIMARY DEMONSTRATION   ANSWERED BY patient   RELATIONSHIP SELF     Abuse Screening Questionnaire 8/1/2019   Do you ever feel afraid of your partner? N   Are you in a relationship with someone who physically or mentally threatens you? N   Is it safe for you to go home? Y     3 most recent PHQ Screens 8/1/2019   Little interest or pleasure in doing things Not at all   Feeling down, depressed, irritable, or hopeless Not at all   Total Score PHQ 2 0     Fall Risk Assessment, last 12 mths 8/1/2019   Able to walk? Yes   Fall in past 12 months?  No

## 2019-08-01 NOTE — PATIENT INSTRUCTIONS
Please find out if you received Pneumonia vaccine. Back Pain: Care Instructions Your Care Instructions Back pain has many possible causes. It is often related to problems with muscles and ligaments of the back. It may also be related to problems with the nerves, discs, or bones of the back. Moving, lifting, standing, sitting, or sleeping in an awkward way can strain the back. Sometimes you don't notice the injury until later. Arthritis is another common cause of back pain. Although it may hurt a lot, back pain usually improves on its own within several weeks. Most people recover in 12 weeks or less. Using good home treatment and being careful not to stress your back can help you feel better sooner. Follow-up care is a key part of your treatment and safety. Be sure to make and go to all appointments, and call your doctor if you are having problems. It's also a good idea to know your test results and keep a list of the medicines you take. How can you care for yourself at home? · Sit or lie in positions that are most comfortable and reduce your pain. Try one of these positions when you lie down: ? Lie on your back with your knees bent and supported by large pillows. ? Lie on the floor with your legs on the seat of a sofa or chair. ? Lie on your side with your knees and hips bent and a pillow between your legs. ? Lie on your stomach if it does not make pain worse. · Do not sit up in bed, and avoid soft couches and twisted positions. Bed rest can help relieve pain at first, but it delays healing. Avoid bed rest after the first day of back pain. · Change positions every 30 minutes. If you must sit for long periods of time, take breaks from sitting. Get up and walk around, or lie in a comfortable position. · Try using a heating pad on a low or medium setting for 15 to 20 minutes every 2 or 3 hours. Try a warm shower in place of one session with the heating pad. · You can also try an ice pack for 10 to 15 minutes every 2 to 3 hours. Put a thin cloth between the ice pack and your skin. · Take pain medicines exactly as directed. ? If the doctor gave you a prescription medicine for pain, take it as prescribed. ? If you are not taking a prescription pain medicine, ask your doctor if you can take an over-the-counter medicine. · Take short walks several times a day. You can start with 5 to 10 minutes, 3 or 4 times a day, and work up to longer walks. Walk on level surfaces and avoid hills and stairs until your back is better. · Return to work and other activities as soon as you can. Continued rest without activity is usually not good for your back. · To prevent future back pain, do exercises to stretch and strengthen your back and stomach. Learn how to use good posture, safe lifting techniques, and proper body mechanics. When should you call for help? Call your doctor now or seek immediate medical care if: 
  · You have new or worsening numbness in your legs.  
  · You have new or worsening weakness in your legs. (This could make it hard to stand up.)  
  · You lose control of your bladder or bowels.  
 Watch closely for changes in your health, and be sure to contact your doctor if: 
  · You have a fever, lose weight, or don't feel well.  
  · You do not get better as expected. Where can you learn more? Go to http://carolyne-fabiana.info/. Enter S505 in the search box to learn more about \"Back Pain: Care Instructions. \" Current as of: September 20, 2018 Content Version: 12.1 © 7081-5858 Healthwise, Incorporated. Care instructions adapted under license by [employer] (which disclaims liability or warranty for this information). If you have questions about a medical condition or this instruction, always ask your healthcare professional. [address] any warranty or liability for your use of this information. Hyperlipidemia: After Your Visit Your Care Instructions Hyperlipidemia is too much fat in your blood. The body has several kinds of fat, including cholesterol and triglycerides. Your body needs fat for many things, such as making new cells. But too much fat in your blood increases your chances of having a heart attack or stroke. You may be able to lower your cholesterol and triglycerides with a heart-healthy diet, exercise, and if needed, medicine. Your doctor may want you to try lifestyle changes first to see whether they lower the fat in your blood. You may need to take medicine if lifestyle changes do not lower the fat in your blood enough. Follow-up care is a key part of your treatment and safety. Be sure to make and go to all appointments, and call your doctor if you are having problems. Its also a good idea to know your test results and keep a list of the medicines you take. How can you care for yourself at home? Take your medicines · Take your medicines exactly as prescribed. Call your doctor if you think you are having a problem with your medicine. · If you take medicine to lower your cholesterol, go to follow-up visits. You will need to have blood tests. · Do not take large doses of niacin, which is a B vitamin, while taking medicine called statins. It may increase the chance of muscle pain and liver problems. · Talk to your doctor about avoiding grapefruit juice if you are taking statins. Grapefruit juice can raise the level of this medicine in your blood. This could increase side effects. Eat more fruits, vegetables, and fiber · Fruits and vegetables have lots of nutrients that help protect against heart disease, and they have littleif anyfat. Try to eat at least five servings a day. Dark green, deep orange, or yellow fruits and vegetables are healthy choices. · Keep carrots, celery, and other veggies handy for snacks.  Buy fruit that is in season and store it where you can see it so that you will be tempted to eat it. Cook dishes that have a lot of veggies in them, such as stir-fries and soups. · Foods high in fiber may reduce your cholesterol and provide important vitamins and minerals. High-fiber foods include whole-grain cereals and breads, oatmeal, beans, brown rice, citrus fruits, and apples. · Buy whole-grain breads and cereals instead of white bread and pastries. Limit saturated fat · Read food labels and try to avoid saturated fat and trans fat. They increase your risk of heart disease. · Use olive or canola oil when you cook. Try cholesterol-lowering spreads, such as Benecol or Take Control. · Bake, broil, grill, or steam foods instead of frying them. · Limit the amount of high-fat meats you eat, including hot dogs and sausages. Cut out all visible fat when you prepare meat. · Eat fish, skinless poultry, and soy products such as tofu instead of high-fat meats. Soybeans may be especially good for your heart. Eat at least two servings of fish a week. Certain fish, such as salmon, contain omega-3 fatty acids, which may help reduce your risk of heart attack. · Choose low-fat or fat-free milk and dairy products. Get exercise, limit alcohol, and quit smoking · Get more exercise. Work with your doctor to set up an exercise program. Even if you can do only a small amount, exercise will help you get stronger, have more energy, and manage your weight and your stress. Walking is an easy way to get exercise. Gradually increase the amount you walk every day. Aim for at least 30 minutes on most days of the week. You also may want to swim, bike, or do other activities. · Limit alcohol to no more than 2 drinks a day for men and 1 drink a day for women. · Do not smoke. If you need help quitting, talk to your doctor about stop-smoking programs and medicines. These can increase your chances of quitting for good. When should you call for help? Call 911 anytime you think you may need emergency care. For example, call if: 
· You have symptoms of a heart attack. These may include: ¨ Chest pain or pressure, or a strange feeling in the chest. 
¨ Sweating. ¨ Shortness of breath. ¨ Nausea or vomiting. ¨ Pain, pressure, or a strange feeling in the back, neck, jaw, or upper belly or in one or both shoulders or arms. ¨ Lightheadedness or sudden weakness. ¨ A fast or irregular heartbeat. After you call 911, the  may tell you to chew 1 adult-strength or 2 to 4 low-dose aspirin. Wait for an ambulance. Do not try to drive yourself. · You have signs of a stroke. These may include: 
¨ Sudden numbness, paralysis, or weakness in your face, arm, or leg, especially on only one side of your body. ¨ New problems with walking or balance. ¨ Sudden vision changes. ¨ Drooling or slurred speech. ¨ New problems speaking or understanding simple statements, or feeling confused. ¨ A sudden, severe headache that is different from past headaches. · You passed out (lost consciousness). Call your doctor now or seek immediate medical care if: 
· You have muscle pain or weakness. Watch closely for changes in your health, and be sure to contact your doctor if: 
· You are very tired. · You have an upset stomach, gas, constipation, or belly pain or cramps. Where can you learn more? Go to Crowdvance.be Enter C406 in the search box to learn more about \"Hyperlipidemia: After Your Visit. \"  
© 3968-6786 Healthwise, Incorporated. Care instructions adapted under license by [facility] [employer] [facility] (which disclaims liability or warranty for this information).  This care instruction is for use with your licensed healthcare professional. If you have questions about a medical condition or this instruction, always ask your healthcare professional. [place] [address] any warranty or liability for your use of this information. Content Version: 0.6.726937; Last Revised: October 13, 2011 Prediabetes: Care Instructions Your Care Instructions Prediabetes is a warning sign that you are at risk for getting type 2 diabetes. It means that your blood sugar is higher than it should be. The food you eat turns into sugar, which your body uses for energy. Normally, an organ called the pancreas makes insulin, which allows the sugar in your blood to get into your body's cells. But when your body can't use insulin the right way, the sugar doesn't move into cells. It stays in your blood instead. This is called insulin resistance. The buildup of sugar in the blood causes prediabetes. The good news is that lifestyle changes may help you get your blood sugar back to normal and help you avoid or delay diabetes. Follow-up care is a key part of your treatment and safety. Be sure to make and go to all appointments, and call your doctor if you are having problems. It's also a good idea to know your test results and keep a list of the medicines you take. How can you care for yourself at home? · Watch your weight. A healthy weight helps your body use insulin properly. · Limit the amount of calories, sweets, and unhealthy fat you eat. Ask your doctor if you should see a dietitian. A registered dietitian can help you create meal plans that fit your lifestyle. · Get at least 30 minutes of exercise on most days of the week. Exercise helps control your blood sugar. It also helps you maintain a healthy weight. Walking is a good choice. You also may want to do other activities, such as running, swimming, cycling, or playing tennis or team sports. · Do not smoke. Smoking can make prediabetes worse. If you need help quitting, talk to your doctor about stop-smoking programs and medicines. These can increase your chances of quitting for good. · If your doctor prescribed medicines, take them exactly as prescribed. Call your doctor if you think you are having a problem with your medicine. You will get more details on the specific medicines your doctor prescribes. When should you call for help? Watch closely for changes in your health, and be sure to contact your doctor if: 
  · You have any symptoms of diabetes. These may include: 
? Being thirsty more often. ? Urinating more. ? Being hungrier. ? Losing weight. ? Being very tired. ? Having blurry vision.  
  · You have a wound that will not heal.  
  · You have an infection that will not go away.  
  · You have problems with your blood pressure.  
  · You want more information about diabetes and how you can keep from getting it. Where can you learn more? Go to http://carolyne-fabiana.info/. Enter I222 in the search box to learn more about \"Prediabetes: Care Instructions. \" Current as of: July 25, 2018 Content Version: 12.1 © 0263-4043 Healthwise, Incorporated. Care instructions adapted under license by [employer] (which disclaims liability or warranty for this information). If you have questions about a medical condition or this instruction, always ask your healthcare professional. [address] any warranty or liability for your use of this information.

## 2019-08-02 ENCOUNTER — TELEPHONE (OUTPATIENT)
Dept: INTERNAL MEDICINE CLINIC | Age: 64
End: 2019-08-02

## 2019-08-02 LAB — 25(OH)D3 SERPL-MCNC: 27.6 NG/ML (ref 30–100)

## 2019-08-02 NOTE — TELEPHONE ENCOUNTER
Hospital Outpatient Visit on 08/01/2019   Component Date Value Ref Range Status    Hemoglobin A1c 08/01/2019 5.8* 4.2 - 5.6 % Final    Est. average glucose 08/01/2019 120  mg/dL Final    LIPID PROFILE 08/01/2019        Final    Cholesterol, total 08/01/2019 229* <200 MG/DL Final    Triglyceride 08/01/2019 149  <150 MG/DL Final    HDL Cholesterol 08/01/2019 41  40 - 60 MG/DL Final    LDL, calculated 08/01/2019 158.2* 0 - 100 MG/DL Final    VLDL, calculated 08/01/2019 29.8  MG/DL Final    CHOL/HDL Ratio 08/01/2019 5.6* 0 - 5.0   Final    Magnesium 08/01/2019 2.2  1.6 - 2.6 mg/dL Final    Sodium 08/01/2019 139  136 - 145 mmol/L Final    Potassium 08/01/2019 4.1  3.5 - 5.5 mmol/L Final    Chloride 08/01/2019 106  100 - 111 mmol/L Final    CO2 08/01/2019 28  21 - 32 mmol/L Final    Anion gap 08/01/2019 5  3.0 - 18 mmol/L Final    Glucose 08/01/2019 105* 74 - 99 mg/dL Final    BUN 08/01/2019 19* 7.0 - 18 MG/DL Final    Creatinine 08/01/2019 0.74  0.6 - 1.3 MG/DL Final    BUN/Creatinine ratio 08/01/2019 26* 12 - 20   Final    GFR est AA 08/01/2019 >60  >60 ml/min/1.73m2 Final    GFR est non-AA 08/01/2019 >60  >60 ml/min/1.73m2 Final    Calcium 08/01/2019 9.3  8.5 - 10.1 MG/DL Final    Bilirubin, total 08/01/2019 0.3  0.2 - 1.0 MG/DL Final    ALT (SGPT) 08/01/2019 22  13 - 56 U/L Final    AST (SGOT) 08/01/2019 18  10 - 38 U/L Final    Alk. phosphatase 08/01/2019 91  45 - 117 U/L Final    Protein, total 08/01/2019 7.5  6.4 - 8.2 g/dL Final    Albumin 08/01/2019 4.5  3.4 - 5.0 g/dL Final    Globulin 08/01/2019 3.0  2.0 - 4.0 g/dL Final    A-G Ratio 08/01/2019 1.5  0.8 - 1.7   Final    Vitamin D 25-Hydroxy 08/01/2019 27.6* 30 - 100 ng/mL Final     Reviewed labs from visit. Please let the patient know the following:    Renal function is normal with creatinine 0.74/ eGFR >60. However, evidence of dehydration with BUN/creatinine ratio 26. Would recommend increasing fluid intake.      Liver function is normal.     Vitamin D level is low at 27.6. Would recommend beginning an over the counter Vitamin D3 (cholecalciferol) supplement at 1000 U daily in addition to the calcium-Vitamin D supplement she is currently taking. Lipid panel remains very elevated with total chol 229, HDL 41, and . Will recheck at next visit to see the response to initiating atorvastatin as discussed. HbA1c has improved to 5.8. Was 6.1 four months ago. Would continue to recommend avoiding concentrated sweets, and limiting carbohydrates, and also recommend increasing exercise and weight loss.

## 2019-08-02 NOTE — TELEPHONE ENCOUNTER
Called and spoke to patient about below message. Patient verbalized understanding and didn't have any additional questions.

## 2019-08-05 ENCOUNTER — HOSPITAL ENCOUNTER (OUTPATIENT)
Dept: PHYSICAL THERAPY | Age: 64
Discharge: HOME | End: 2019-08-05
Payer: MEDICAID

## 2019-08-05 VITALS
RESPIRATION RATE: 14 BRPM | BODY MASS INDEX: 23.29 KG/M2 | DIASTOLIC BLOOD PRESSURE: 70 MMHG | TEMPERATURE: 98.8 F | HEART RATE: 71 BPM | SYSTOLIC BLOOD PRESSURE: 118 MMHG | OXYGEN SATURATION: 96 % | WEIGHT: 118.6 LBS | HEIGHT: 60 IN

## 2019-08-05 PROBLEM — M54.42 CHRONIC BILATERAL LOW BACK PAIN WITH BILATERAL SCIATICA: Status: ACTIVE | Noted: 2019-08-05

## 2019-08-05 PROBLEM — Z87.891 FORMER SMOKER: Status: ACTIVE | Noted: 2019-08-05

## 2019-08-05 PROBLEM — G62.9 PERIPHERAL POLYNEUROPATHY: Status: ACTIVE | Noted: 2019-08-05

## 2019-08-05 PROBLEM — E55.9 VITAMIN D DEFICIENCY: Status: ACTIVE | Noted: 2019-08-05

## 2019-08-05 PROBLEM — K52.839 MICROSCOPIC COLITIS: Status: ACTIVE | Noted: 2019-08-05

## 2019-08-05 PROBLEM — M85.89 OSTEOPENIA OF MULTIPLE SITES: Status: ACTIVE | Noted: 2019-03-29

## 2019-08-05 PROBLEM — F17.200 CURRENT EVERY DAY SMOKER: Status: ACTIVE | Noted: 2019-08-05

## 2019-08-05 PROBLEM — G89.29 CHRONIC BILATERAL LOW BACK PAIN WITH BILATERAL SCIATICA: Status: ACTIVE | Noted: 2019-08-05

## 2019-08-05 PROBLEM — M54.41 CHRONIC BILATERAL LOW BACK PAIN WITH BILATERAL SCIATICA: Status: ACTIVE | Noted: 2019-08-05

## 2019-08-05 PROCEDURE — 97162 PT EVAL MOD COMPLEX 30 MIN: CPT | Performed by: PHYSICAL THERAPIST

## 2019-08-05 PROCEDURE — 97530 THERAPEUTIC ACTIVITIES: CPT | Performed by: PHYSICAL THERAPIST

## 2019-08-05 PROCEDURE — 97112 NEUROMUSCULAR REEDUCATION: CPT | Performed by: PHYSICAL THERAPIST

## 2019-08-05 PROCEDURE — 97110 THERAPEUTIC EXERCISES: CPT | Performed by: PHYSICAL THERAPIST

## 2019-08-05 NOTE — PROGRESS NOTES
[facility]  [address] [facility], [address] [place] [address], [facility] [address]  [phone] fax      Plan of Care/ Statement of Necessity for Physical Therapy Services    Patient name: Amari Marie Start of Care: 2019   Referral source: Cliff Simon MD : [id number]    Medical Diagnosis: Low back pain [M54.5]  Payor: [facility] MEDICAID / Plan: [address] [place] [address] CCCP / Product Type: Managed Care Medicaid /  Onset Date:19    Treatment Diagnosis: Low back pain   Prior Hospitalization: see medical history Provider#: [id number]   Medications: Verified on Patient summary List    Comorbidities: patient reports: tobacco use, back pain, GI issues, HBP, Sleep dysfunction   Prior Level of Function: 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 The Plan of Care and following information is based on the information from the initial evaluation. Assessment/ key information: Patient is a pleasant older adult female with progressive exacerbation of persistent low back pain. Objective measures and key findings suggest lumbar derangement with lateral shift to the R vs. Lumbar dysfunction; presenting with strong directional preference and centralization with extension. Her case is complicated by poor lifestyle factors (such as smoking and poor hydration) as well as elevated fear avoidance belief factor. Treatment will focus on pain and lifestyle education, graded activity exposure, progressive return to lifting moderated to heavy items with little to no difficulty. Treatment will focus on extension based exercises with specific strengthening of lumbar musculature.    Evaluation Complexity History MEDIUM  Complexity : 1-2 comorbidities / personal factors will impact the outcome/ POC ; Examination LOW Complexity : 1-2 Standardized tests and measures addressing body structure, function, activity limitation and / or participation in recreation  ;Presentation LOW Complexity : Stable, uncomplicated  ;Clinical Decision Making MEDIUM Complexity : FOTO score of 26-74  Overall Complexity Rating: MEDIUM  Problem List: pain affecting function, decrease ROM, decrease strength, edema affecting function, impaired gait/ balance, decrease ADL/ functional abilitiies, decrease activity tolerance and decrease flexibility/ joint mobility   Treatment Plan may include any combination of the following: Therapeutic exercise, Therapeutic activities, Neuromuscular re-education, Gait/balance training, Manual therapy, Patient education, Self Care training, Functional mobility training and Other: home exercise program  Patient / Family readiness to learn indicated by: trying to perform skills  Persons(s) to be included in education: patient (P)  Barriers to Learning/Limitations: None  Patient Goal (s): I want to lessen the pain  Patient Self Reported Health Status: good  Rehabilitation Potential: good  Short Term Goals: To be accomplished in 3 weeks:  1. Patient will decrease pain during aggravating activities by 2 points on Verbal Analog Scale (Eval: 10/10) to increase participation on Activities of Daily Living such as bending and stooping. 2. Patient will demonstrate increased strength by ½ grade on MMT (Eval: 3) throughout core and lumbar spine to improve functional participation in such tasks as prolonged standing and sitting. 3.   Patient will improve sitting tolerance time by 10 min (Eval: 30 min) without increased pain. Long Term Goals: To be accomplished in 6 weeks:  1. Patient will decrease pain during aggravating activities by 4 points on Verbal Analog Scale (Eval: 10/10) to increase participation on Activities of Daily Living such as squatting, lifting and carrying moderate to heavy items.    2. Patient will demonstrate increased strength by 1 grade on MMT (Eval: 3+) to improve functional participation in such tasks as standing and sitting for greater than 30 min. 3. Patient will achieve predicted FOTO scores (57, eval 50) to demonstrate decreased functional impairment to facilitate improved participation in activities of daily living, improve overall quality of life. Frequency / Duration: Patient to be seen 2-3 times per week for 16 treatments. Patient/ Caregiver education and instruction: Diagnosis, prognosis, self care, activity modification, exercises and other home exercise program   [x]  Plan of care has been reviewed with JEREMY Pham, PT 8/5/2019 9:54 AM  ________________________________________________________________________    I certify that the above Therapy Services are being furnished while the patient is under my care. I agree with the treatment plan and certify that this therapy is necessary.     Physician's Signature:____________Date:_________TIME:________    [employer], Date and Time must be completed for valid certification **      Please sign and return to In . Tevin [place]luis [address] [place] [address], [address] [place] [address],[facility] [address]  [phone] fax

## 2019-08-05 NOTE — PROGRESS NOTES
PT DAILY TREATMENT NOTE/LUMBAR EVAL 10-18    Patient Name: Eleazar Echeverria  Date:2019  : 1955  [x]  Patient  Verified  Payor: [facility] MEDICAID / Plan: [facility] [address] [facility] [employer] / Product Type: Managed Care Medicaid /    In time:9:43A  Out time:10:58A  Total Treatment Time (min): 75min  Visit #: 1 of 16    Medicare/BCBS Only   Total Timed Codes (min):   1:1 Treatment Time:       Treatment Area: Low back pain [M54.5]  SUBJECTIVE  Pain Level (0-10 scale): 7/10  []constant [x]intermittent []improving [x]worsening []no change since onset    Any medication changes, allergies to medications, adverse drug reactions, diagnosis change, or new procedure performed?: [x] No    [] Yes (see summary sheet for update)  Subjective functional status/changes:     PLOF: 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  Substance use: []Alcohol []Tobacco []other:   FABQ Score: []low [x]elevate  Cognition: A & O x 4    Other:    OBJECTIVE/EXAMINATION  Domestic Life: see above  Activity/Recreational Limitations: Good Mobility: see gait assessment below  Self Care: I with ADLs    20 min [x]Eval                  []Re-Eval       10 min Therapeutic Exercise:  [x] See flow sheet :   Rationale: increase ROM and increase strength to improve the patients ability to A/ROM and decrease pain with movement. 22 min Therapeutic Activity:  [x]  See flow sheet :   Rationale: increase strength and improve coordination  to improve the patients ability to Tolerate basic ADLs and job-related tasks without pain. 13 min Neuromuscular Re-education:  [x]  See flow sheet :   Rationale: improve coordination and increase proprioception  to improve the patients ability to perform activities with good form and proprioception with tactile and verbal cuing appropriately. 10 min Manual Therapy:  Grade 5 mobs to lumbar and sacral spine, long axis distraction, MET Shotgun technique. Rationale: decrease pain, increase tissue extensibility and increase postural awareness to improve activity tolerance          With   [x] TE   [x] TA   [x] neuro   [] other: Patient Education: [x] Review HEP    [x] Progressed/Changed HEP based on:   [x] positioning   [] body mechanics   [] transfers   [] heat/ice application    [] other:      Other Objective/Functional Measures:     30 sec Timed Sit to Stand Test: 11 reps  HR: 74bpm  SpO2: 97%  BP: 128/72    Physical Therapy Evaluation - Lumbar Spine ([facility])    SUBJECTIVE  Symptoms:  Aggravated by:   [x] Bending [x] Sitting [] Standing [x] Walking   [] Moving [] Cough [] Sneeze [] Valsalva   [] AM  [] PM  Lying:  [] sup   [] pro   [] sidelying   [] Other:     Eased by:    [] Bending [] Sitting [x] Standing [] Walking   [] Moving [x] AM  [] PM  Lying: [] sup  [] pro  [] sidelying   [] Other:     General Health:  Red Flags Indicated? [] Yes    [x] No  [] Yes [] No Recent weight change (If yes, due to dieting?  [] Yes  [] No)   [] Yes [] No Weakness in legs during walking  [] Yes [] No Unremitting pain at night  [] Yes [] No Abdominal pain or problems  [] Yes [] No Rectal bleeding  [] Yes [] No Feet more cold or painful in cold weather  [] Yes [] No Menstrual irregularities  [] Yes [] No Blood or pain with urination  [] Yes [] No Dysfunction of bowel or bladder  [] Yes [] No Recent illness within past 3 weeks (i.e, cold, flu)  [] Yes [] No Numbness/tingling in buttock/genitalia region    Past History/Treatments:     Diagnostic Tests: [] Lab work [x] X-rays    [] CT [] MRI     [] Other:  Results:degenerative changes    Functional Status  What position do you sleep in?: on Stomach    OBJECTIVE  Posture:  Lateral Shift: [] R    [x] L     [] +  [] -  Kyphosis: [] Increased [] Decreased   [x]  WNL  Lordosis:  [] Increased [x] Decreased   [] WNL  Pelvic symmetry: [] WNL    [x] Other: L ASIS may be higher than R    Gait:  [] Normal     [x] Abnormal: constant weight shift, lateral shift to the l    Active Movements: [] N/A   [] Too acute   [] Other:  ROM % AROM % PROM Comments:pain, area   Forward flexion 40-60 50     Extension 20-30 10     SB right 20-30 10     SB left 20-30 10     Rotation right 5-10 6     Rotation left 5-10 6       Repeated Movements   Effects on present pain: produces (OH), abolishes (A), increases (incr), decreases (decr), centralizes (C), peripheral (PH), no effect (NE)   Pre-Test Sx Flexion Repeated Flexion Extension Repeated Extension Repeated SBL Repeated SBR   Sitting  NE NE NE      Standing   PH Dec Bonnie     Lying    Dec Bonnie N/A N/A   Comments:  Side Glide:  Sustained passive positioning test:    Neuro Screen [x] WNL  Myotome/Dermatome/Reflexes: Hyperactive at 2+ bilaterally patellar reflexes, 1+ bilateral achilles reflex.   Comments:    Dural Mobility:  SLR Sitting: [] R    [] L    [] +    [x] -  @ (degrees):           Supine: [] R    [] L    [] +    [x] -  @ (degrees):   Slump Test: [] R    [] L    [] +    [x] -  @ (degrees):   Prone Knee Bend: [] R    [x] L    [x] +    [] -     Palpation  [] Min  [x] Mod [] Severe    Location: lumbar paraspinals  [] Min  [] Mod  [] Severe    Location:  [] Min  [] Mod  [] Severe    Location:    Strength   L(0-5) R (0-5) N/T   Hip Flexion (L1,2) 3+ 3+ []   Knee Extension (L3,4) 3+ 3+ []   Ankle Dorsiflexion (L4) 3+ 3+ []   Great Toe Extension (L5) 4- 4- []   Ankle Plantarflexion (S1) 3+ 3+ []   Knee Flexion (S1,2) 3+ 3+ []   Upper Abdominals 3 3 []   Lower Abdominals 3 3 []   Paraspinals 3 3 []   Back Rotators 3 3 []   Gluteus Derek 3 3 []   Other   []     Special Tests  Lumbar:  Lumb. Compression: [] Pos  [x] Neg               Lumbar Distraction:   [x] Pos  [] Neg    Quadrant:  [] Pos  [] Neg   [] Flex  [] Ext    Sacroilliac:  Gaenslen's: [] R    [] L    [] +    [x] -     Compression: [] +    [] -     Gapping:  [x] +    [] -     Thigh Thrust: [] R    [x] L    [] +    [] -     Leg Length: [] +    [] -   Position:           Hip: Huma Cowden:  [] R    [] L    [x] +    [] -     Scour:  [] R    [] L    [] +    [] -     Piriformis: [] R    [] L    [] +    [] -          Deficits: Gus's: [] R    [] L    [] +    [x] -     Luis Armando: [] R    [] L    [] +    [x] -     Hamstrings 90/90: neg    Gastrocsoleus (to neutral): Right: Left:       Global Muscular Weakness:  Abdominals: 3+  Quadratus Lumborum: 3+  Paraspinals: 3+  Other:    Other tests/comments:     Pain Level (0-10 scale) post treatment: 3/10    ASSESSMENT/Changes in Function: Patient is a pleasant older adult female with progressive exacerbation of persistent low back pain. Objective measures and key findings suggest lumbar derangement with lateral shift to the R vs. Lumbar dysfunction; presenting with strong directional preference and centralization with extension. Her case is complicated by poor lifestyle factors (such as smoking and poor hydration) as well as elevated fear avoidance belief factor.  Treatment will focus on pain and lifestyle education, graded activity exposure, progressive return to lifting moderated to heavy items with little to no difficulty. Treatment will focus on extension based exercises with specific strengthening of lumbar musculature. Patient will continue to benefit from skilled PT services to modify and progress therapeutic interventions, address functional mobility deficits, address ROM deficits, address strength deficits, analyze and address soft tissue restrictions and analyze and cue movement patterns to attain remaining goals. [x]  See Plan of Care  []  See progress note/recertification  []  See Discharge Summary         Progress towards goals / Updated goals:  Short Term Goals: To be accomplished in 3 weeks:  1. Patient will decrease pain during aggravating activities by 2 points on Verbal Analog Scale (Eval: 10/10) to increase participation on Activities of Daily Living such as bending and stooping. 2. Patient will demonstrate increased strength by ½ grade on MMT (Eval: 3) throughout core and lumbar spine to improve functional participation in such tasks as prolonged standing and sitting.         3.   Patient will improve sitting tolerance time by 10 min (Eval: 30 min) without increased pain.       Long Term Goals: To be accomplished in 6 weeks:  1. Patient will decrease pain during aggravating activities by 4 points on Verbal Analog Scale (Eval: 10/10) to increase participation on Activities of Daily Living such as squatting, lifting and carrying moderate to heavy items. 2. Patient will demonstrate increased strength by 1 grade on MMT (Eval: 3+) to improve functional participation in such tasks as standing and sitting for greater than 30 min.    3. Patient will achieve predicted FOTO scores (57, eval 50) to demonstrate decreased functional impairment to facilitate improved participation in activities of daily living, improve overall quality of life.      PLAN  [x]  Upgrade activities as tolerated     []  Continue plan of care  []  Update interventions per flow sheet       []  Discharge due to:_  [] Other:Rigo Castrejon, PT 8/5/2019  9:55 AM

## 2019-08-06 NOTE — PROGRESS NOTES
HPI:   Álvaro Nunes is a 59y.o. year old female who presents today to [facility]. She was a previous patient of [facility]. She has a history of hypertension, hyperlipidemia, prediabetes, microscopic colitis, GERD, osteopenia, peripheral neuropathy, and depression. She also has a prior history of alcoholism, but has been sober since 1988. She presents today and reports that she is doing reasonably well. She reports difficulty with low back pain for the last 2 years, which she states first started while hiking the [employer] She states that she experiences aching pain if sitting and ambulating, and reports that she can no longer play tennis due to the discomfort. She denies radiation of the pain into her legs, but she does report bilateral buttock pain. She states that she underwent evaluation by Dr. Ramya Logan in 8/2018. At that time, her pain was mainly localized to her bilateral hips, and hip x-rays were essentially negative. He stated that her pain was likely musculoskeletal and advised conservative management. She states that her pain worsened while she was caring for her elderly parents, who have since passed away. Since that time, she has tried massage, yoga, and chiropractor therapy without significant relief. She was prescribed gabapentin at bedtime by a prior physician, but has not found it to be of benefit. She does not take anything else for pain, and has not had physical therapy. She underwent lumbar x-rays (3/2019) which showed mild disc space narrowing L4-L5 and L5-S1; and bilateral hip and pelvis x-rays (3/2019) which were normal. She denies any fevers, chills, weight change, saddle paresthesia, neurogenic bowel or bladder symptoms, or recent trauma. She has a history of hypertension, treated with lisinopril and hydrochlorothiazide. She does not monitor her blood pressure at home.  She remains very active, and denies any chest pain, shortness of breath at rest or with exertion, palpitations, lightheadedness, or edema. She has a history of hyperlipidemia, and was prescribed moderate intensity dose atorvastatin in 4/2019, but did not yet start taking it. She has a history of prediabetes, with elevated fasting blood sugar evident since at least 2016. HbA1c was first measured in 3/2019 and was elevated at 6.1. She denies any polyuria, polydipsia, nocturia, or blurry vision, and has no history of retinopathy or nephropathy. She has a long history of peripheral neuropathy, which may be painful at times. She states that it started over 30 years ago, but has remained stable without worsening. In 3/2019, evaluation included a normal B12 level. SPEP was also ordered, and showed a mild elevation of beta globulins, but no M-spike was observed. She has a history of GERD, which has been treated in the past with omeprazole. In 4/2018, she underwent colonoscopy by Dr. Lilo Abreu for colon cancer screening as well as to evaluate diarrhea. It showed moderate sigmoid diverticulosis, a 5 mm sessile rectal polyp, and random biopsies were taken with pathology showing lymphocytic colitis. Follow-up recommended for 5 years. She was treated with budesonide with significant improvement. She states that she successfully weaned from taking budesonide for the last year, and has not had a recurrence of symptoms. She denies any abdominal pain, nausea, vomiting, melena, hematochezia, or change in bowel movements. She has a history of osteopenia with last bone density study in 4/2017 showing T-scores:  femoral neck left -1.6  /right -1.6 and lumbar -1.6 . She continues to take calcium and Vitamin D supplements. She has no history of pathologic fractures. She has a history of depression and alcoholism, but has remained sober since 1988. She continues to smoke 0.5 ppd and has been smoking for the last 40 years. She has been unsuccessful at stopping despite treatment with Chantix and Wellbutrin.  She denies any cough or dyspnea. She has difficulty with insomnia and uses melatonin with good effect. Past Medical History:   Diagnosis Date    Depression     Essential hypertension     Generalized osteoarthrosis, involving multiple sites     GERD (gastroesophageal reflux disease)     Microscopic colitis 8/5/2019    Osteopenia of multiple sites 3/29/2019    Peripheral polyneuropathy 8/5/2019    Personal history of alcoholism ([facility] [address]     Prediabetes 04/10/2019     Past Surgical History:   Procedure Laterality Date    HX CHOLECYSTECTOMY      HX COLONOSCOPY  9/2010; 4/2018    ([facility]) diverticuli; 2nd colo showed polyp/lymphocytic colitis    HX TONSIL AND ADENOIDECTOMY      HX TUBAL LIGATION Bilateral      Current Outpatient Medications   Medication Sig    atorvastatin (LIPITOR) 10 mg tablet Take 1 Tab by mouth daily.  diclofenac EC (VOLTAREN) 50 mg EC tablet Take 1 Tab by mouth three (3) times daily.  lisinopril-hydroCHLOROthiazide (PRINZIDE, ZESTORETIC) 20-12.5 mg per tablet Take 1 Tab by mouth daily.  calcium carbonate-vitamin D3 1,000 mg(2,500 mg)-800 unit tab Take 1 Tab by mouth daily.  melatonin 10 mg cap Take 10 mg by mouth.  gabapentin (NEURONTIN) 300 mg capsule Take 2 tablets by mouth at bedtime as needed for pain (Patient taking differently: 300 mg daily. Take 2 tablets by mouth at bedtime as needed for pain)    albuterol (PROVENTIL HFA, VENTOLIN HFA, PROAIR HFA) 90 mcg/actuation inhaler Take 1-2 Puffs by inhalation every four (4) hours as needed for Wheezing.  varicella-zoster recombinant, PF, (SHINGRIX, PF,) 50 mcg/0.5 mL susr injection Inject first dose IM then second dose 4 to 6 months from the first injection    budesonide (ENTOCORT EC) 3 mg capsule Take 3 mg by mouth every morning. No current facility-administered medications for this visit. Allergies and Intolerances:    Allergies   Allergen Reactions    Celebrex [Celecoxib] Rash and Palpitations     Family History: She has no family history of colon or breast cancer. Her mother had an MI at the age of 61, and her father had CAD and CHF. Family History   Problem Relation Age of Onset   Steff Diaz Arthritis-osteo Mother     Ulcerative Colitis Mother     Breast Cancer Mother         breast, age70s    Heart Disease Father         chf    Hypertension Brother     Diabetes Brother     Elevated Lipids Brother     No Known Problems Brother     No Known Problems Brother      Social History:   She  reports that she has been smoking. She has a 20.00 pack-year smoking history. She quit smokeless tobacco use about 3 years ago. She continues to smoke 0.5 ppd x 40 years. Social History     Substance and Sexual Activity   Alcohol Use No    Alcohol/week: 0.0 standard drinks    Comment: quit 1988     Immunization History:  Immunization History   Administered Date(s) Administered    Influenza Vaccine (Quad) PF 09/09/2016, 09/18/2017, 11/08/2018    Td 08/15/2006    Tdap 09/18/2017    Zoster Recombinant 03/26/2019    Zoster Vaccine, Live 10/02/2017       Review of Systems:   As above included in HPI. Otherwise 11 point review of systems negative including constitutional, skin, HENT, eyes, respiratory, cardiovascular, gastrointestinal, genitourinary, musculoskeletal, endocrine, hematologic, allergy, and neurologic. Physical:   Vitals:   BP: 118/70  HR: 71  WT: 118 lb 9.6 oz (53.8 kg)  BMI:  23.16    Exam:   Patient appears in no apparent distress. Affect is appropriate. HEENT: PERRLA, anicteric, oropharynx clear, no JVD, adenopathy or thyromegaly. No carotid bruits or radiated murmur. Lungs: clear to auscultation, no wheezes, rhonchi, or rales. Heart: regular rate and rhythm. No murmur, rubs, gallops  Abdomen: soft, nontender, nondistended, normal bowel sounds, no hepatosplenomegaly or masses. Extremities: without edema. Pulses 1-2+ bilaterally. Back: no tenderness to palpation over spine; negative straight leg raises bilaterally. Review of Data:  Labs:  No visits with results within 1 Month(s) from this visit. Latest known visit with results is:   Hospital Outpatient Visit on 04/10/2019   Component Date Value Ref Range Status    C-Reactive Protein, Cardiac 04/10/2019 2.21  0.00 - 3.00 mg/L Final    Sed rate, automated 04/10/2019 40* 0 - 30 mm/hr Final       Calculated 10 year ASCVD risk score: 13 %    Health Maintenance:  Screening:    Mammogram: negative (2019)   PAP smear: well women exams with NP Marely Mayfield (last pap smear/HPV negative 2017)   Colorectal: colonoscopy (2018) Dr. Yamini Easley. Due . Depression: none currently   DM (HbA1c/FPG): HbA1c 6.1 (3/2019)   Hepatitis C: negative (3/2019)   Falls: none   DEXA: osteopenia (2017)   Glaucoma: unknown   Smokin.5 ppd x 40 years   Vitamin D: 29.6 (3/2019)   Medicare Wellness: N/A        Impression:  Patient Active Problem List   Diagnosis Code    Personal history of alcoholism ([facility] [address] F10.21    Generalized osteoarthrosis, involving multiple sites M15.9    GERD (gastroesophageal reflux disease) K21.9    Depression F32.9    Hyperlipidemia E78.5    Essential hypertension I10    Chronic peripheral neuropathic pain M79.2, G89.29    Osteopenia of multiple sites M85.89    Prediabetes R73.03    Chronic bilateral low back pain with bilateral sciatica M54.42, M54.41, G89.29    Vitamin D deficiency E55.9    Peripheral polyneuropathy G62.9    Current every day smoker F17.200    Microscopic colitis K52.839       Plan:  1. Hypertension. Blood pressure well controlled on current regimen of lisinopril 20 mg daily and hydrochlorothiazide 12.5 mg daily. Renal function has been normal with creatinine 0.68/ eGFR >60. Will continue to follow. 2. Hyperlipidemia. Calculated 10 year ASCVD risk is 13 %, which places her in one of the four statin benefit groups as per new AHA/ACC guidelines (primary prevention: 10 year ASCVD risk >7.5%).  LDL significantly elevated at 156 in 3/2019 and prescribed atorvastatin, but did not begin. Discussed today and recommended initiating treatment with statin. Agreeable to begin atorvastatin and will start at 10 mg daily. Emphasized importance of lifestyle modifications, including diet, exercise, and weight loss. Will recheck lipid panel at next visit. Continue to follow. 3. Prediabetes. Elevated fasting blood sugar present since at least 2016, but HbA1c first checked in 3/2019 and found to be elevated at 6.1. No evidence of retinopathy or nephropathy, but does have long standing peripheral neuropathy. On Ace-I and will begin statin. Urged to obtain eye exam. Will perform foot exam and obtain urine microalbumin/ creatinine ratio at next visit. 4. Low back pain. Patient reports difficulty with low back pain for the last 2 years after hiking. Feels was exacerbated by caring for her elderly parents prior to their death. No improvement with conservative measures including yoga, massage, and chiropractor care. Lumbar x-ray with evidence of mild degenerative disc changes at L4/5 and L5/S1. Patient reports pain in back and buttocks. Has been using gabapentin without benefit, so instructed to discontinue today. Will refer patient to physical therapy and begin diclofenac 50 mg tid to help provide symptomatic relief. If no improvement with these interventions, will consider lumbar MRI and referral to [facility]. Follow. 5. Osteopenia. Last bone density scan 7/2017 . Using femoral neck T-scores, calculated FRAX score estimates her 10 year risk of a major osteoporetic fracture at 8.9 % and hip fracture at 1.7 %, which are not an indication for biphosphonate treatment (>20% and >3%, respectively). Continue calcium and Vitamin D. Encouraged exercise, particularly weight bearing activities. Patient due for a repeat bone density study and already scheduled. Follow. 6. Microscopic colitis. Diagnosed in 4/2018 during random colonic biopsies performed due to complaint of diarrhea. Weaned successfully off of budesonide treatment 1 year ago and has not had a recurrence since that time. Will continue to follow. 7. Peripheral neuropathy. Patient reports that it has been present for over 30 years. B12 and SPEP normal. HbA1c is elevated, but doubt that was responsible. May be related to prior alcohol abuse and small fiber neuropathy associated with excessive alcohol. She states that she does often have pain in her feet, but she has learned to cope with the discomfort and does not wish to receive medication. She also has a history of plantar fasciitis which may exacerbate the pain in her feet. Will continue to follow. 8. Smoking cessation. Discussed at length with the patient, including benefits of improved lung function as well as decreased risk of cardiovascular disease and cancer. Medication and non-pharmacologic options explored. Reviewed strategies to maximize success, including removing cigarettes from environment, addressing triggers, and stress management. Total time spent on topic <10 min. 9. GERD. Currently without symptoms and no longer needing PPI. States that has learned to avoid foods that act as triggers. Follow. 10. Depression. She has a history of alcoholism and has attended AA for the last 31 years. She does use melatonin for insomnia, but does not have any active symptoms of depression. Will continue to follow. 11. Health maintenance. She received 1/2 doses of Shingrix vaccine. Discussed Pneumovax 23 (indication: current smoker) and patient wishing to ask pharmacy if she received. Received Tdap. Other immunizations up to date. Mammogram up to date. Pap smear up to date. Scheduled for repeat bone density study. Colonoscopy due in 2023. Vitamin D level mildly low. Will reassess. Emphasized importance of lifestyle modifications, including diet, exercise, and weight loss.     Total time: 40 minutes spent with the patient in face-to-face consultation of which greater than 50% was spent on counseling, answering questions and/or coordination of care. Complex medical review and management performed. Patient understands recommendations and agrees with plan. Follow-up in 8 weeks. [facility] Outpatient Visit on 08/01/2019   Component Date Value Ref Range Status    Hemoglobin A1c 08/01/2019 5.8* 4.2 - 5.6 % Final    Est. average glucose 08/01/2019 120  mg/dL Final    LIPID PROFILE 08/01/2019        Final    Cholesterol, total 08/01/2019 229* <200 MG/DL Final    Triglyceride 08/01/2019 149  <150 MG/DL Final    HDL Cholesterol 08/01/2019 41  40 - 60 MG/DL Final    LDL, calculated 08/01/2019 158.2* 0 - 100 MG/DL Final    VLDL, calculated 08/01/2019 29.8  MG/DL Final    CHOL/HDL Ratio 08/01/2019 5.6* 0 - 5.0   Final    Magnesium 08/01/2019 2.2  1.6 - 2.6 mg/dL Final    Sodium 08/01/2019 139  136 - 145 mmol/L Final    Potassium 08/01/2019 4.1  3.5 - 5.5 mmol/L Final    Chloride 08/01/2019 106  100 - 111 mmol/L Final    CO2 08/01/2019 28  21 - 32 mmol/L Final    Anion gap 08/01/2019 5  3.0 - 18 mmol/L Final    Glucose 08/01/2019 105* 74 - 99 mg/dL Final    BUN 08/01/2019 19* 7.0 - 18 MG/DL Final    Creatinine 08/01/2019 0.74  0.6 - 1.3 MG/DL Final    BUN/Creatinine ratio 08/01/2019 26* 12 - 20   Final    GFR est AA 08/01/2019 >60  >60 ml/min/1.73m2 Final    GFR est non-AA 08/01/2019 >60  >60 ml/min/1.73m2 Final    Calcium 08/01/2019 9.3  8.5 - 10.1 MG/DL Final    Bilirubin, total 08/01/2019 0.3  0.2 - 1.0 MG/DL Final    ALT (SGPT) 08/01/2019 22  13 - 56 U/L Final    AST (SGOT) 08/01/2019 18  10 - 38 U/L Final    Alk. phosphatase 08/01/2019 91  45 - 117 U/L Final    Protein, total 08/01/2019 7.5  6.4 - 8.2 g/dL Final    Albumin 08/01/2019 4.5  3.4 - 5.0 g/dL Final    Globulin 08/01/2019 3.0  2.0 - 4.0 g/dL Final    A-G Ratio 08/01/2019 1.5  0.8 - 1.7   Final    Vitamin D 25-Hydroxy 08/01/2019 27.6* 30 - 100 ng/mL Final     Reviewed labs from visit. Renal function is normal with creatinine 0.74/ eGFR >60. However, evidence of dehydration with BUN/creatinine ratio 26. Will recommend increasing fluid intake. Liver function is normal.   Vitamin D level is low at 27.6. Willrecommend beginning an over the counter Vitamin D3 (cholecalciferol) supplement at 1000 U daily in addition to the calcium-Vitamin D supplement she is currently taking. Lipid panel remains very elevated with total chol 229, HDL 41, and . Will recheck at next visit to see the response to initiating atorvastatin as discussed. HbA1c has improved to 5.8. Was 6.1 four months ago.  Would continue to recommend avoiding concentrated sweets, and limiting carbohydrates, and also recommend increasing exercise and weight loss.

## 2019-08-15 ENCOUNTER — HOSPITAL ENCOUNTER (OUTPATIENT)
Dept: PHYSICAL THERAPY | Age: 64
Discharge: HOME | End: 2019-08-15
Payer: MEDICAID

## 2019-08-15 PROCEDURE — 97530 THERAPEUTIC ACTIVITIES: CPT

## 2019-08-15 PROCEDURE — 97112 NEUROMUSCULAR REEDUCATION: CPT

## 2019-08-15 PROCEDURE — 97110 THERAPEUTIC EXERCISES: CPT

## 2019-08-15 PROCEDURE — 97140 MANUAL THERAPY 1/> REGIONS: CPT

## 2019-08-15 NOTE — PROGRESS NOTES
PT DAILY TREATMENT NOTE 10-18    Patient Name: Zena Lemus  Date:8/15/2019  : 1955  [x]  Patient  Verified  Payor: Rip Sandoval / Plan: [facility] [address] [facility] / Product Type: Managed Care Medicaid /    In time:455  Out time:552  Total Treatment Time (min): 48  Visit #: 2 of 16    Medicare/BCBS Only   Total Timed Codes (min):  - 1:1 Treatment Time:  -       Treatment Area: Low back pain [M54.5]    SUBJECTIVE  Pain Level (0-10 scale): 4  Any medication changes, allergies to medications, adverse drug reactions, diagnosis change, or new procedure performed?: [x] No    [] Yes (see summary sheet for update)  Subjective functional status/changes:   [] No changes reported  \" Doing a little better today with my back. I didn't have to sit as much at work . I went to a new exercise class last night at [facility] and did well with it. \"       Up to 52 ounces a day with the water.     OBJECTIVE    Modality rationale:     Min Type Additional Details    [] Estim:  []Unatt       []IFC  []Premod                        []Other:  []w/ice   []w/heat  Position:  Location:    [] Estim: []Att    []TENS instruct  []NMES                    []Other:  []w/US   []w/ice   []w/heat  Position:  Location:    []  Traction: [] Cervical       []Lumbar                       [] Prone          []Supine                       []Intermittent   []Continuous Lbs:  [] before manual  [] after manual    []  Ultrasound: []Continuous   [] Pulsed                           []1MHz   []3MHz W/cm2:  Location:    []  Iontophoresis with dexamethasone         Location: [] Take home patch   [] In clinic   PD []  Ice     []  heat  []  Ice massage  []  Laser   []  Anodyne Position:  Location:    []  Laser with stim  []  Other:  Position:  Location:    []  Vasopneumatic Device Pressure:       [] lo [] med [] hi   Temperature: [] lo [] med [] hi   [] Skin assessment post-treatment:  []intact []redness- no adverse reaction []redness  adverse reaction:       min []Eval                  []Re-Eval       15 min Therapeutic Exercise:  [x] See flow sheet :   Rationale: increase ROM, increase strength and improve coordination to improve the patients ability to aid with increase tolerance to ADLs and activities    8 min Therapeutic Activity:  []  See flow sheet :ergonomics, water intake, exercise safety with home program   Rationale:   to improve the patients ability to      10 min Neuromuscular Re-education:  [x]  See flow sheet :   Rationale: increase ROM, increase strength and improve coordination  to improve the patients ability to aid with increase tolerance to ADLs and activities    15 min Manual Therapy:  STM DTM TPR Left >right piriformis   Rationale: decrease pain, increase ROM, increase tissue extensibility and decrease trigger points to aid with increase tolerance to ADLs and activities      min Gait Training:  ___ feet with ___ device on level surfaces with ___ level of assist   Rationale: With   [x] TE   [x] TA   [] neuro   [] other: Patient Education: [x] Review HEP    [x] Progressed/Changed HEP based on:   [] positioning   [] body mechanics   [] transfers   [] heat/ice application    [x] other: Ergonomics,      Other Objective/Functional Measures: VC exercises and technique     Pain Level (0-10 scale) post treatment: 4    ASSESSMENT/Changes in Function: first full day back and tolerated well. Patient will continue to benefit from skilled PT services to modify and progress therapeutic interventions, address functional mobility deficits, address ROM deficits, address strength deficits, analyze and address soft tissue restrictions, analyze and cue movement patterns, analyze and modify body mechanics/ergonomics, assess and modify postural abnormalities and instruct in home and community integration to attain remaining goals.      [x]  See Plan of Care  []  See progress note/recertification  []  See Discharge Summary         Progress towards goals / Updated goals:   Short Term Goals: To be accomplished in 3 weeks:  1. Patient will decrease pain during aggravating activities by 2 points on Verbal Analog Scale (Eval: 10/10) to increase participation on Activities of Daily Living such as bending and stooping. CURRENT 4 8/15/19  2. Patient will demonstrate increased strength by ½ grade on MMT (Eval: 3) throughout core and lumbar spine to improve functional participation in such tasks as prolonged standing and sitting.         3.   Patient will improve sitting tolerance time by 10 min (Eval: 30 min) without increased pain.       Long Term Goals: To be accomplished in 6 weeks:  1. Patient will decrease pain during aggravating activities by 4 points on Verbal Analog Scale (Eval: 10/10) to increase participation on Activities of Daily Living such as squatting, lifting and carrying moderate to heavy items. CURRENT 4 8/15/19  2. Patient will demonstrate increased strength by 1 grade on MMT (Eval: 3+) to improve functional participation in such tasks as standing and sitting for greater than 30 min.          3. Patient will achieve predicted FOTO scores (57, eval 50) to demonstrate decreased functional impairment to facilitate improved participation in activities of daily living, improve overall quality of life    PLAN  [x]  Upgrade activities as tolerated     [x]  Continue plan of care  []  Update interventions per flow sheet       []  Discharge due to:_  []  Other:_      Keren Prince, PT 8/15/2019  4:58 PM    Future Appointments   Date Time Provider Nicky Kline   8/15/2019  5:00 PM Yordan Whitlock PT [id number]   8/20/2019 10:30 AM [facility] BONE DEXA RM 1 [facility]   10/1/2019  9:30 AM [facility] NURSE VISIT [facility] YOLANDA SCHED   10/8/2019  1:30 PM Mandi Bradford MD [facility]

## 2019-08-16 ENCOUNTER — HOSPITAL ENCOUNTER (OUTPATIENT)
Dept: PHYSICAL THERAPY | Age: 64
Discharge: HOME | End: 2019-08-16
Payer: MEDICAID

## 2019-08-16 PROCEDURE — 97110 THERAPEUTIC EXERCISES: CPT | Performed by: PHYSICAL THERAPIST

## 2019-08-16 PROCEDURE — 97530 THERAPEUTIC ACTIVITIES: CPT | Performed by: PHYSICAL THERAPIST

## 2019-08-16 NOTE — PROGRESS NOTES
PT DAILY TREATMENT NOTE 10-18    Patient Name: Vicente Noble  Date:2019  : 1955  [x]  Patient  Verified  Payor: [facility] MEDICAID / [facility] / Product Type: Managed Care Medicaid /    In time:8:58A  Out time: 9:39A  Total Treatment Time (min): 41min  Visit #: 3 of 16    Medicare/BCBS Only   Total Timed Codes (min):   1:1 Treatment Time:         Treatment Area: Low back pain [M54.5]    SUBJECTIVE  Pain Level (0-10 scale): 4/10  Any medication changes, allergies to medications, adverse drug reactions, diagnosis change, or new procedure performed?: [x] No    [] Yes (see summary sheet for update)  Subjective functional status/changes:   [] No changes reported  Patient states she went to [place] over the weekend. The drive was rough, but woke up Saturday morning feeling good. Then  was bad pain. Monday was better. Has started going back to Yoga with a different instructor and likes it a lot better. OBJECTIVE    29 min Therapeutic Exercise:  [x] See flow sheet :   Rationale: increase ROM and increase strength to improve the patients ability to A/ROM and decrease pain with movement. 12 min Therapeutic Activity:  [x]  See flow sheet :   Rationale: increase strength and improve coordination  to improve the patients ability to Tolerate basic ADLs and job-related tasks without pain. With   [x] TE   [x] TA   [] neuro   [] other: Patient Education: [x] Review HEP    [x] Progressed/Changed HEP based on:   [x] positioning   [] body mechanics   [] transfers   [] heat/ice application    [] other:      Other Objective/Functional Measures: MMT on L quad 3+/5, R quad 4-/5     Pain Level (0-10 scale) post treatment: 1/10    ASSESSMENT/Changes in Function: patient is progressing well towards goals. Feels overall a little better, but more or less the same. Discussed Delayed onset muscle soreness.      Patient will continue to benefit from skilled PT services to modify and progress therapeutic interventions, address functional mobility deficits, address ROM deficits, address strength deficits, analyze and address soft tissue restrictions, analyze and cue movement patterns, analyze and modify body mechanics/ergonomics and assess and modify postural abnormalities to attain remaining goals. [x]  See Plan of Care  []  See progress note/recertification  []  See Discharge Summary         Progress towards goals / Updated goals:  Short Term Goals: To be accomplished in 3 weeks:  1. Patient will decrease pain during aggravating activities by 2 points on Verbal Analog Scale (Eval: 10/10) to increase participation on Activities of Daily Living such as bending and stooping. CURRENT 4 8/15/19   2. Patient will demonstrate increased strength by ½ grade on MMT (Eval: 3) throughout core and lumbar spine to improve functional participation in such tasks as prolonged standing and sitting.         3.   Patient will improve sitting tolerance time by 10 min (Eval: 30 min) without increased pain.       Long Term Goals: To be accomplished in 6 weeks:  1. Patient will decrease pain during aggravating activities by 4 points on Verbal Analog Scale (Eval: 10/10) to increase participation on Activities of Daily Living such as squatting, lifting and carrying moderate to heavy items. CURRENT 4 8/15/19  2. Patient will demonstrate increased strength by 1 grade on MMT (Eval: 3+) to improve functional participation in such tasks as standing and sitting for greater than 30 min.          3. Patient will achieve predicted FOTO scores (57, eval 50) to demonstrate decreased functional impairment to facilitate improved participation in activities of daily living, improve overall quality of life.     PLAN  [x]  Upgrade activities as tolerated     []  Continue plan of care  []  Update interventions per flow sheet       []  Discharge due to:_  []  Other:_      Doreen Castrejon, PT 8/16/2019  9:04 AM    Future Appointments   Date Time Provider Nicky Kline   8/20/2019 10:30 AM [facility] BONE DEXA RM 1 [facility]   8/20/2019  2:00 PM Adarsh Ya [facility]   8/22/2019  5:30 PM Dima Reyes, PT [facility]   8/23/2019  3:00 PM Mima SmithRio [facility]   8/26/2019  4:00 PM Colby Giang PTA [facility]   8/28/2019  5:00 PM Dima Reyes, PT [facility]   8/30/2019  8:00 AM Dima Reyes, PT [facility]   9/3/2019  8:00 AM Dima Reyes, PT [facility]   9/5/2019  5:00 PM Dima Reyes, PT [facility]   9/6/2019  8:00 AM Dima Reyes, PT [facility]   10/1/2019  9:30 AM [facility] NURSE VISIT [facility] SCH[facility]   10/8/2019  1:30 PM Denisse Levi MD [facility]

## 2019-08-20 ENCOUNTER — HOSPITAL ENCOUNTER (OUTPATIENT)
Dept: BONE DENSITY | Age: 64
Discharge: HOME | End: 2019-08-20
Attending: NURSE PRACTITIONER
Payer: MEDICAID

## 2019-08-20 ENCOUNTER — HOSPITAL ENCOUNTER (OUTPATIENT)
Dept: PHYSICAL THERAPY | Age: 64
Discharge: HOME | End: 2019-08-20
Payer: MEDICAID

## 2019-08-20 DIAGNOSIS — M85.80 OSTEOPENIA AFTER MENOPAUSE: ICD-10-CM

## 2019-08-20 DIAGNOSIS — Z78.0 OSTEOPENIA AFTER MENOPAUSE: ICD-10-CM

## 2019-08-20 PROCEDURE — 77080 DXA BONE DENSITY AXIAL: CPT

## 2019-08-20 PROCEDURE — 97110 THERAPEUTIC EXERCISES: CPT

## 2019-08-20 PROCEDURE — 97140 MANUAL THERAPY 1/> REGIONS: CPT

## 2019-08-20 NOTE — PROGRESS NOTES
PT DAILY TREATMENT NOTE 10-18    Patient Name: Guerda Celeste  Date:2019  : 1955  [x]  Patient  Verified  Payor: [facility] MEDICAID / Plan: [id number] [facility] CCCP / Product Type: Managed Care Medicaid /    In time:2:03  Out time:2:45  Total Treatment Time (min): 42  Visit #: 4 of 16    Treatment Area: Low back pain [M54.5]    SUBJECTIVE  Pain Level (0-10 scale): 4  Any medication changes, allergies to medications, adverse drug reactions, diagnosis change, or new procedure performed?: [x] No    [] Yes (see summary sheet for update)  Subjective functional status/changes:   [] No changes reported  Pt reports having some buttock pain but also now moving up into low back    OBJECTIVE    32 min Therapeutic Exercise:  [] See flow sheet :   Rationale: increase ROM, increase strength and improve coordination to improve the patients ability to perform ADLs         10 min Manual Therapy:  DTM/TPR to left piri, LAD to left LE to forrect left upslip, MET to correct left PI followed by shotgun, Left SIJ PA mobs   Rationale: decrease pain, increase ROM and increase tissue extensibility to improve functional mobility            With   [] TE   [] TA   [] neuro   [] other: Patient Education: [x] Review HEP    [] Progressed/Changed HEP based on:   [] positioning   [] body mechanics   [] transfers   [] heat/ice application    [] other:      Other Objective/Functional Measures:   Left upslip  Left post innominate  Left SIJ hypomobile  TTP @ left piri     Pain Level (0-10 scale) post treatment: 1    ASSESSMENT/Changes in Function: Noted TTP @ left piri, pt edu on using tennis ball at home. Noted decr'd pain and ms tension following manual therapy today.      Patient will continue to benefit from skilled PT services to modify and progress therapeutic interventions, address functional mobility deficits, address ROM deficits, address strength deficits, analyze and address soft tissue restrictions, analyze and cue movement patterns, analyze and modify body mechanics/ergonomics and assess and modify postural abnormalities to attain remaining goals. []  See Plan of Care  []  See progress note/recertification  []  See Discharge Summary         Progress towards goals / Updated goals:  Short Term Goals: To be accomplished in 3 weeks:  1. Patient will decrease pain during aggravating activities by 2 points on Verbal Analog Scale (Eval: 10/10) to increase participation on Activities of Daily Living such as bending and stooping.   CURRENT 4 8/15/19   2. Patient will demonstrate increased strength by ½ grade on MMT (Eval: 3) throughout core and lumbar spine to improve functional participation in such tasks as prolonged standing and sitting.         3.   Patient will improve sitting tolerance time by 10 min (Eval: 30 min) without increased pain.       Long Term Goals: To be accomplished in 6 weeks:  1. Patient will decrease pain during aggravating activities by 4 points on Verbal Analog Scale (Eval: 10/10) to increase participation on Activities of Daily Living such as squatting, lifting and carrying moderate to heavy items.   CURRENT 4 8/15/19  2. Patient will demonstrate increased strength by 1 grade on MMT (Eval: 3+) to improve functional participation in such tasks as standing and sitting for greater than 30 min.          3.Patient will achieve predicted FOTO scores (57, eval 50) to demonstrate decreased functional impairment to facilitate improved participation in activities of daily living, improve overall quality of life.     PLAN  []  Upgrade activities as tolerated     [x]  Continue plan of care  []  Update interventions per flow sheet       []  Discharge due to:_  []  Other:_      Ned Cadena PTA 8/20/2019  2:13 PM    Future Appointments   Date Time Provider Nicky Kline   8/22/2019  5:30 PM Fina Wadsworth PT [facility]   8/23/2019  3:00 PM Corby Kan [facility]   8/26/2019  4:00 PM Italia Morales PTA [facility]   8/28/2019  5:00 PM Collette Heimlich, PT [facility]   8/30/2019  8:00 AM Collette Heimlich, PT [facility]   9/3/2019  8:00 AM Collette Heimlich, PT [facility]   9/5/2019  5:00 PM Collette Heimlich, PT [facility]   9/6/2019  8:00 AM Collette Heimlich, PT [facility]   10/1/2019  9:30 AM [facility] NURSE VISIT [facility]   10/8/2019  1:30 PM Naveen Ro MD [facility]

## 2019-08-22 ENCOUNTER — HOSPITAL ENCOUNTER (OUTPATIENT)
Dept: PHYSICAL THERAPY | Age: 64
Discharge: HOME | End: 2019-08-22
Payer: MEDICAID

## 2019-08-22 PROCEDURE — 97530 THERAPEUTIC ACTIVITIES: CPT

## 2019-08-22 PROCEDURE — 97110 THERAPEUTIC EXERCISES: CPT

## 2019-08-22 PROCEDURE — 97140 MANUAL THERAPY 1/> REGIONS: CPT

## 2019-08-22 NOTE — PROGRESS NOTES
PT DAILY TREATMENT NOTE 10-18    Patient Name: Nick Burgess  Date:2019  : 1955  [x]  Patient  Verified  Payor: [facility] MEDICAID / Plan: [facility] [address] [facility] CCCP / Product Type: Managed Care Medicaid /    In time:526  Out time:610  Total Treatment Time (min): 40  Visit #: 5 of 16    Medicare/BCBS Only   Total Timed Codes (min):   1:1 Treatment Time:         Treatment Area: Low back pain [M54.5]    SUBJECTIVE  Pain Level (0-10 scale): 4  Any medication changes, allergies to medications, adverse drug reactions, diagnosis change, or new procedure performed?: [x] No    [] Yes (see summary sheet for update)  Subjective functional status/changes:   [] No changes reported  \"I am doing alright. \"    OBJECTIVE     Modality rationale:     Min Type Additional Details    [] Estim:  []Unatt       []IFC  []Premod                        []Other:  []w/ice   []w/heat  Position:  Location:    [] Estim: []Att    []TENS instruct  []NMES                    []Other:  []w/US   []w/ice   []w/heat  Position:  Location:    []  Traction: [] Cervical       []Lumbar                       [] Prone          []Supine                       []Intermittent   []Continuous Lbs:  [] before manual  [] after manual    []  Ultrasound: []Continuous   [] Pulsed                           []1MHz   []3MHz W/cm2:  Location:    []  Iontophoresis with dexamethasone         Location: [] Take home patch   [] In clinic   PD will do at home []  Ice     []  heat  []  Ice massage  []  Laser   []  Anodyne Position:  Location:    []  Laser with stim  []  Other:  Position:  Location:    []  Vasopneumatic Device Pressure:       [] lo [] med [] hi   Temperature: [] lo [] med [] hi   [] Skin assessment post-treatment:  []intact []redness- no adverse reaction    []redness  adverse reaction:       min []Eval                  []Re-Eval       17 min Therapeutic Exercise:  [x] See flow sheet :   Rationale: increase ROM, increase strength and improve coordination to improve the patients ability to aid with increase tolerance to ADLs and activities    8 min Therapeutic Activity:  [x]  See flow sheet :   Rationale: increase ROM, increase strength and improve coordination  to improve the patients ability to aid with increase tolerance to ADLs and activities      min Neuromuscular Re-education:  []  See flow sheet :   Rationale:   to improve the patients ability to     15 min Manual Therapy:  STM DTM TPR B Gluts/piriformis   Rationale: decrease pain, increase ROM, increase tissue extensibility and decrease trigger points to aid with increase tolerance to ADLs and activities     min Gait Training:  ___ feet with ___ device on level surfaces with ___ level of assist   Rationale: With   [x] TE   [x] TA   [] neuro   [] other: Patient Education: [x] Review HEP    [x] Progressed/Changed HEP based on:   [] positioning   [] body mechanics   [] transfers   [] heat/ice application    [x] other: piriformis self mob     Other Objective/Functional Measures: VC exercises and technique     Pain Level (0-10 scale) post treatment: 4    ASSESSMENT/Changes in Function: tolerated well. Patient will continue to benefit from skilled PT services to modify and progress therapeutic interventions, address ROM deficits, address strength deficits, analyze and address soft tissue restrictions, analyze and cue movement patterns, analyze and modify body mechanics/ergonomics, assess and modify postural abnormalities and instruct in home and community integration to attain remaining goals. [x]  See Plan of Care  []  See progress note/recertification  []  See Discharge Summary         Progress towards goals / Updated goals:  Short Term Goals: To be accomplished in 3 weeks:  1.  Patient will decrease pain during aggravating activities by 2 points on Verbal Analog Scale (Eval: 10/10) to increase participation on Activities of Daily Living such as bending and stooping.   CURRENT 4 8/15/19  8/22/19  2. Patient will demonstrate increased strength by ½ grade on MMT (Eval: 3) throughout core and lumbar spine to improve functional participation in such tasks as prolonged standing and sitting.         3.   Patient will improve sitting tolerance time by 10 min (Eval: 30 min) without increased pain.       Long Term Goals: To be accomplished in 6 weeks:  1. Patient will decrease pain during aggravating activities by 4 points on Verbal Analog Scale (Eval: 10/10) to increase participation on Activities of Daily Living such as squatting, lifting and carrying moderate to heavy items.   CURRENT 4 8/15/19   4 8/22/19  2. Patient will demonstrate increased strength by 1 grade on MMT (Eval: 3+) to improve functional participation in such tasks as standing and sitting for greater than 30 min.          3.Patient will achieve predicted FOTO scores (57, eval 50) to demonstrate decreased functional impairment to facilitate improved participation in activities of daily living, improve overall quality of life.     PLAN  [x]  Upgrade activities as tolerated     [x]  Continue plan of care  []  Update interventions per flow sheet       []  Discharge due to:_  []  Other:_      Rangel Cid, PT 8/22/2019  5:25 PM    Future Appointments   Date Time Provider Nicky Kline   8/22/2019  5:30 PM Jovan Hammer PT [facility]   8/23/2019  3:00 PM Sarah Body [facility]   8/26/2019  4:00 PM Symone Kendrick, PTA [facility]   8/28/2019  5:00 PM Jovan Hammer, PT [facility]   8/30/2019  8:00 AM Jovan Hammer, PT [facility]   9/3/2019  8:00 AM Jovan Hammer, PT [facility]   9/5/2019  5:00 PM Jovan Hammer PT [facility]   9/6/2019  8:00 AM Jovan Hammer, PT [facility]   10/1/2019  9:30 AM [facility] NURSE VISIT [facility]   10/8/2019  1:30 PM Cliff Simon MD [facility]

## 2019-08-24 ENCOUNTER — TELEPHONE (OUTPATIENT)
Dept: INTERNAL MEDICINE CLINIC | Age: 64
End: 2019-08-24

## 2019-08-24 NOTE — TELEPHONE ENCOUNTER
Reviewed bone density study from 8/20/2019 showing T-scores:  femoral neck left -1.7  /right -1.8 and lumbar -2.4 . Calculated FRAX score estimates her 10 year risk of a major osteoporetic fracture at 9.4 % and hip fracture at 1.2 %, which are not an indication for biphosphonate treatment. Please let the patient know that her bone density study showed osteopenia, which is a decrease in bone mass. It is significantly decreased in her lumbar spine, approaching the level which would be considered osteoporosis. She should continue taking calcium and Vitamin D and would encourage her to begin exercising, particularly weight bearing activities. Also, please ask her to exercise fall precautions. Will discuss further at her next visit.

## 2019-08-26 ENCOUNTER — HOSPITAL ENCOUNTER (OUTPATIENT)
Dept: PHYSICAL THERAPY | Age: 64
Discharge: HOME | End: 2019-08-26
Payer: MEDICAID

## 2019-08-26 PROCEDURE — 97110 THERAPEUTIC EXERCISES: CPT

## 2019-08-26 PROCEDURE — 97032 APPL MODALITY 1+ESTIM EA 15: CPT

## 2019-08-26 NOTE — PROGRESS NOTES
PT DAILY TREATMENT NOTE 10-18    Patient Name: Halie Adkins  Date:2019  : 1955  [x]  Patient  Verified  Payor: [facility] MEDICAID / Plan: [facility] [address] [facility] CCCP / Product Type: Managed Care Medicaid /    In time:4:00  Out time:4:54  Total Treatment Time (min): 48  Visit #: 7 of 16    Medicare/BCBS Only   Total Timed Codes (min):   1:1 Treatment Time:         Treatment Area: Low back pain [M54.5]    SUBJECTIVE  Pain Level (0-10 scale): 5  Any medication changes, allergies to medications, adverse drug reactions, diagnosis change, or new procedure performed?: [x] No    [] Yes (see summary sheet for update)  Subjective functional status/changes:   [] No changes reported  \"  Pt reports  No improvement. \"    OBJECTIVE    Modality rationale: decrease edema, decrease inflammation and decrease pain to improve the patients ability to perform  ADL   Min Type Additional Details    [] Estim:  []Unatt       []IFC  []Premod                        []Other:  []w/ice   []w/heat  Position:  Location:   12 [x] Estim: [x]Att    []TENS instruct  []NMES                    [x]Other: LTO []w/US   []w/ice   []w/heat  Position:prone  Location:(B) piriformis    []  Traction: [] Cervical       []Lumbar                       [] Prone          []Supine                       []Intermittent   []Continuous Lbs:  [] before manual  [] after manual    []  Ultrasound: []Continuous   [] Pulsed                           []1MHz   []3MHz W/cm2:  Location:    []  Iontophoresis with dexamethasone         Location: [] Take home patch   [] In clinic    []  Ice     []  heat  []  Ice massage  []  Laser   []  Anodyne Position:  Location:    []  Laser with stim  []  Other:  Position:  Location:    []  Vasopneumatic Device Pressure:       [] lo [] med [] hi   Temperature: [] lo [] med [] hi   [x] Skin assessment post-treatment:  [x]intact []redness- no adverse reaction    []redness  adverse reaction:      min []Eval                  []Re-Eval 36 min Therapeutic Exercise:  [x] See flow sheet :   Rationale: increase ROM and increase strength to improve the patients ability to perform ADL     min Therapeutic Activity:  []  See flow sheet :   Rationale:   to improve the patients ability to       min Neuromuscular Re-education:  []  See flow sheet :   Rationale:   to improve the patients ability to      min Manual Therapy:     Rationale: decrease pain, increase ROM, increase tissue extensibility and decrease edema  to perform ADL      min Gait Training:  ___ feet with ___ device on level surfaces with ___ level of assist   Rationale: With   [x] TE   [] TA   [] neuro   [] other: Patient Education: [x] Review HEP    [] Progressed/Changed HEP based on:   [] positioning   [] body mechanics   [] transfers   [] heat/ice application    [] other:      Other Objective/Functional Measures:      Pain Level (0-10 scale) post treatment: 2    ASSESSMENT/Changes in Function: completed  Each the aric  Fairly well. Discussed with pt on performing self mob with tennis ball and  Ice to decrease pain and  Tightness. Patient will continue to benefit from skilled PT services to address functional mobility deficits, address ROM deficits, address strength deficits, analyze and address soft tissue restrictions, analyze and cue movement patterns and instruct in home and community integration to attain remaining goals. [x]  See Plan of Care  []  See progress note/recertification  []  See Discharge Summary         Progress towards goals / Updated goals:  Short Term Goals: To be accomplished in 3 weeks:  1. Patient will decrease pain during aggravating activities by 2 points on Verbal Analog Scale (Eval: 10/10) to increase participation on Activities of Daily Living such as bending and stooping.   CURRENT 4 8/15/19  8/22/19  2.  Patient will demonstrate increased strength by ½ grade on MMT (Eval: 3) throughout core and lumbar spine to improve functional participation in such tasks as prolonged standing and sitting.         3.   Patient will improve sitting tolerance time by 10 min (Eval: 30 min) without increased pain.       Long Term Goals: To be accomplished in 6 weeks:  1. Patient will decrease pain during aggravating activities by 4 points on Verbal Analog Scale (Eval: 10/10) to increase participation on Activities of Daily Living such as squatting, lifting and carrying moderate to heavy items.   CURRENT 4 8/15/19   4 8/22/19  2. Patient will demonstrate increased strength by 1 grade on MMT (Eval: 3+) to improve functional participation in such tasks as standing and sitting for greater than 30 min.          3.Patient will achieve predicted FOTO scores (57, eval 50) to demonstrate decreased functional impairment to facilitate improved participation in activities of daily living, improve overall quality of life.     PLAN  []  Upgrade activities as tolerated     [x]  Continue plan of care  []  Update interventions per flow sheet       []  Discharge due to:_  []  Other:_      Italia Morales PTA 8/26/2019  4:33 PM    Future Appointments   Date Time Provider Nicky Kline   8/28/2019  5:00 PM Collette Coon, PT [facility]   8/30/2019  8:00 AM Collette Coon, PT [facility]   9/3/2019  8:00 AM Collette Coon, PT [facility]   9/5/2019  5:00 PM Collette Coon, PT [facility]   9/6/2019  8:00 AM Collette Coon, PT [facility]   9/9/2019  8:00 AM Italia Morales PTA [facility]   9/11/2019  5:00 PM Carter Madison PTA [facility]   9/13/2019  8:00 AM Collette Coon, PT [facility]   10/1/2019  9:30 AM [facility] NURSE VISIT [facility]   10/8/2019  1:30 PM Paulo Multani MD [facility]

## 2019-08-26 NOTE — TELEPHONE ENCOUNTER
Called and verified full name and date of birth. Informed patient of Dr. Linus Ott' message/ orders and patient verbalized understanding and stated that she is doing some exercises with physical therapy and riding her bike.

## 2019-08-28 ENCOUNTER — HOSPITAL ENCOUNTER (OUTPATIENT)
Dept: PHYSICAL THERAPY | Age: 64
Discharge: HOME | End: 2019-08-28
Payer: MEDICAID

## 2019-08-28 PROCEDURE — 97140 MANUAL THERAPY 1/> REGIONS: CPT

## 2019-08-28 PROCEDURE — 97110 THERAPEUTIC EXERCISES: CPT

## 2019-08-28 NOTE — PROGRESS NOTES
PT DAILY TREATMENT NOTE 10-18    Patient Name: Rohit Walters  Date:2019  : 1955  [x]  Patient  Verified  Payor: [facility] MEDICAID / Plan: [address] [place] [address] CCCP / Product Type: Managed Care Medicaid /    In time: 5:08  Out time:5:47  Total Treatment Time (min): 38  Visit #: 8 of 16    Medicare/BCBS Only   Total Timed Codes (min):   1:1 Treatment Time:         Treatment Area: Low back pain [M54.5]    SUBJECTIVE  Pain Level (0-10 scale): 4  Any medication changes, allergies to medications, adverse drug reactions, diagnosis change, or new procedure performed?: [x] No    [] Yes (see summary sheet for update)  Subjective functional status/changes:   [] No changes reported  \"The laser  Helped  But once  I got home  Pain came  Back. \"    OBJECTIVE    Modality rationale: decrease edema, decrease inflammation, decrease pain and increase tissue extensibility to improve the patients ability to perform ADL   Min Type Additional Details    [] Estim:  []Unatt       []IFC  []Premod                        []Other:  []w/ice   []w/heat  Position:  Location:    [] Estim: []Att    []TENS instruct  []NMES                    []Other:  []w/US   []w/ice   []w/heat  Position:  Location:    []  Traction: [] Cervical       []Lumbar                       [] Prone          []Supine                       []Intermittent   []Continuous Lbs:  [] before manual  [] after manual    []  Ultrasound: []Continuous   [] Pulsed                           []1MHz   []3MHz W/cm2:  Location:    []  Iontophoresis with dexamethasone         Location: [] Take home patch   [] In clinic    []  Ice     []  heat  []  Ice massage  []  Laser   []  Anodyne Position:  Location:    []  Laser with stim  []  Other:  Position:  Location:    []  Vasopneumatic Device Pressure:       [] lo [] med [] hi   Temperature: [] lo [] med [] hi   [x] Skin assessment post-treatment:  [x]intact []redness- no adverse reaction    []redness  adverse reaction:      min []Eval                  []Re-Eval       28 min Therapeutic Exercise:  [x] See flow sheet :   Rationale: increase ROM and increase strength to improve the patients ability to perform ADL     min Therapeutic Activity:  []  See flow sheet :   Rationale:   to improve the patients ability to       min Neuromuscular Re-education:  []  See flow sheet :   Rationale:   to improve the patients ability to     10 min Manual Therapy:  Massage stick:  (B) piriformis   Rationale: decrease pain, increase ROM, increase tissue extensibility and decrease edema  to perform ADL      min Gait Training:  ___ feet with ___ device on level surfaces with ___ level of assist   Rationale: With   [x] TE   [] TA   [] neuro   [] other: Patient Education: [x] Review HEP    [] Progressed/Changed HEP based on:   [] positioning   [] body mechanics   [] transfers   [] heat/ice application    [] other:      Other Objective/Functional Measures:      Pain Level (0-10 scale) post treatment: <4    ASSESSMENT/Changes in Function: Completed  Each  There ex  Fairly well. Discussed  With pt on icing more  Frequently  At  Home. Patient will continue to benefit from skilled PT services to address functional mobility deficits, address ROM deficits, address strength deficits, analyze and address soft tissue restrictions and analyze and cue movement patterns to attain remaining goals. [x]  See Plan of Care  []  See progress note/recertification  []  See Discharge Summary         Progress towards goals / Updated goals:  Short Term Goals: To be accomplished in 3 weeks:  1. Patient will decrease pain during aggravating activities by 2 points on Verbal Analog Scale (Eval: 10/10) to increase participation on Activities of Daily Living such as bending and stooping.   CURRENT 4 8/15/19  8/22/19  2.  Patient will demonstrate increased strength by ½ grade on MMT (Eval: 3) throughout core and lumbar spine to improve functional participation in such tasks as prolonged standing and sitting.         3.   Patient will improve sitting tolerance time by 10 min (Eval: 30 min) without increased pain.       Long Term Goals: To be accomplished in 6 weeks:  1. Patient will decrease pain during aggravating activities by 4 points on Verbal Analog Scale (Eval: 10/10) to increase participation on Activities of Daily Living such as squatting, lifting and carrying moderate to heavy items.   CURRENT 4 8/15/19   4 8/22/19  2.  Patient will demonstrate increased strength by 1 grade on MMT (Eval: 3+) to improve functional participation in such tasks as standing and sitting for greater than 30 min.          3.Patient will achieve predicted FOTO scores (57, eval 50) to demonstrate decreased functional impairment to facilitate improved participation in activities of daily living, improve overall quality of life.       PLAN  []  Upgrade activities as tolerated     [x]  Continue plan of care  []  Update interventions per flow sheet       []  Discharge due to:_  []  Other:_      Gena Adams PTA 8/28/2019  5:34 PM    Future Appointments   Date Time Provider Nicky Kline   8/30/2019  8:00 AM Coleman Martin, PT [facility]   9/3/2019  8:00 AM Coleman Martin, PT [facility]   9/5/2019  5:00 PM Coleman Martin, PT [facility]   9/6/2019  8:00 AM Coleman Martin PT [facility]   9/9/2019  8:00 AM Italia Morales, PTA [facility]   9/11/2019  5:00 PM Alfreda Edwards, PTA [facility]   9/13/2019  8:00 AM Coleman Martin, [facility]   10/1/2019  9:30 AM [facility] NURSE VISIT [facility] YOLANDA BROWN   10/8/2019  1:30 PM Yari Steen MD [facility]

## 2019-08-30 ENCOUNTER — HOSPITAL ENCOUNTER (OUTPATIENT)
Dept: PHYSICAL THERAPY | Age: 64
Discharge: HOME | End: 2019-08-30
Payer: MEDICAID

## 2019-08-30 PROCEDURE — 97110 THERAPEUTIC EXERCISES: CPT

## 2019-08-30 PROCEDURE — 97140 MANUAL THERAPY 1/> REGIONS: CPT

## 2019-08-30 NOTE — PROGRESS NOTES
PT DAILY TREATMENT NOTE 10-18    Patient Name: Clarissa Saravia  Date:2019  : 1955  [x]  Patient  Verified  Payor: Pasha Bruce / Plan: [facility] [address] [facility] CCCP / Product Type: Managed Care Medicaid /    In time:820  Out time:900  Total Treatment Time (min): 37  Visit #: 9 of 16    Medicare/BCBS Only   Total Timed Codes (min):   1:1 Treatment Time:         Treatment Area: Low back pain [M54.5]    SUBJECTIVE  Pain Level (0-10 scale): 4  Any medication changes, allergies to medications, adverse drug reactions, diagnosis change, or new procedure performed?: [x] No    [] Yes (see summary sheet for update)  Subjective functional status/changes:   []    \" please don't do that stick thing down on my tailbone, that really bothers it. I am a 4 today.  \"    OBJECTIVE    Modality rationale:      Min Type Additional Details    [] Estim:  []Unatt       []IFC  []Premod                        []Other:  []w/ice   []w/heat  Position:  Location:    [] Estim: []Att    []TENS instruct  []NMES                    []Other:  []w/US   []w/ice   []w/heat  Position:  Location:    []  Traction: [] Cervical       []Lumbar                       [] Prone          []Supine                       []Intermittent   []Continuous Lbs:  [] before manual  [] after manual    []  Ultrasound: []Continuous   [] Pulsed                           []1MHz   []3MHz W/cm2:  Location:    []  Iontophoresis with dexamethasone         Location: [] Take home patch   [] In clinic    []  Ice     []  heat  []  Ice massage  []  Laser   []  Anodyne Position:  Location:    []  Laser with stim  []  Other:  Position:  Location:    []  Vasopneumatic Device Pressure:       [] lo [] med [] hi   Temperature: [] lo [] med [] hi   [] Skin assessment post-treatment:  []intact []redness- no adverse reaction    []redness  adverse reaction:      min []Eval                  []Re-Eval       23 min Therapeutic Exercise:  [x] See flow sheet :   Rationale: increase ROM, increase strength and improve coordination to improve the patients ability to aid with increase tolerance to ADLs and activities     min Therapeutic Activity:  []  See flow sheet :   Rationale:   to improve the patients ability to       min Neuromuscular Re-education:  []  See flow sheet :   Rationale:   to improve the patients ability to     15 min Manual Therapy:  STM DTM MFR TPR B Piriformis and buttocks   Rationale: decrease pain, increase ROM, increase tissue extensibility and decrease trigger points to aid with increase tolerance to ADLs and activities     min Gait Training:  ___ feet with ___ device on level surfaces with ___ level of assist   Rationale: With   [x] TE   [x] TA   [] neuro   [] other: Patient Education: [x] Review HEP    [] Progressed/Changed HEP based on:   [] positioning   [] body mechanics   [] transfers   [] heat/ice application    [x] other: POC     Other Objective/Functional Measures: VC exercises and tech     Pain Level (0-10 scale) post treatment: 4    ASSESSMENT/Changes in Function: tolerated well. Patient will continue to benefit from skilled PT services to modify and progress therapeutic interventions, address functional mobility deficits, address ROM deficits, address strength deficits, analyze and address soft tissue restrictions, analyze and cue movement patterns, analyze and modify body mechanics/ergonomics, assess and modify postural abnormalities and instruct in home and community integration to attain remaining goals. [x]  See Plan of Care  []  See progress note/recertification  []  See Discharge Summary         Progress towards goals / Updated goals:  Short Term Goals: To be accomplished in 3 weeks:  1. Patient will decrease pain during aggravating activities by 2 points on Verbal Analog Scale (Eval: 10/10) to increase participation on Activities of Daily Living such as bending and stooping.   CURRENT 4 8/15/19  8/22/19  8/30/19  2.  Patient will demonstrate increased strength by ½ grade on MMT (Eval: 3) throughout core and lumbar spine to improve functional participation in such tasks as prolonged standing and sitting.         3.   Patient will improve sitting tolerance time by 10 min (Eval: 30 min) without increased pain.   CURRENT ongoing  And CCO pain 8/30/19     Long Term Goals: To be accomplished in 6 weeks:  1. Patient will decrease pain during aggravating activities by 4 points on Verbal Analog Scale (Eval: 10/10) to increase participation on Activities of Daily Living such as squatting, lifting and carrying moderate to heavy items.   CURRENT 4 8/15/19   4 8/22/19 8/30/19  2. Patient will demonstrate increased strength by 1 grade on MMT (Eval: 3+) to improve functional participation in such tasks as standing and sitting for greater than 30 min.          3.Patient will achieve predicted FOTO scores (57, eval 50) to demonstrate decreased functional impairment to facilitate improved participation in activities of daily living, improve overall quality of life.     PLAN  [x]  Upgrade activities as tolerated     [x]  Continue plan of care  []  Update interventions per flow sheet       []  Discharge due to:_  []  Other:_      Jewels Castro PT 8/30/2019  8:38 AM    Future Appointments   Date Time Provider Nicky Kline   9/3/2019  8:00 AM Denzel Hands, PT [facility]   9/5/2019  5:00 PM Denzel Hands, PT [facility]   9/6/2019  8:00 AM Italia Morales, PTA [facility]   9/9/2019  8:00 AM Chon Render, PTA [facility]   9/11/2019  5:00 PM Chon Render, PTA [facility]   9/13/2019  8:00 AM Denzel Hands, PT [facility]   10/1/2019  9:30 AM [facility] NURSE VISIT [facility] YOLANDA BROWN   10/8/2019  1:30 PM Bry Randhawa MD [facility]

## 2019-09-03 ENCOUNTER — HOSPITAL ENCOUNTER (OUTPATIENT)
Dept: PHYSICAL THERAPY | Age: 64
Discharge: HOME | End: 2019-09-03
Payer: MEDICAID

## 2019-09-03 PROCEDURE — 97530 THERAPEUTIC ACTIVITIES: CPT

## 2019-09-03 PROCEDURE — 97140 MANUAL THERAPY 1/> REGIONS: CPT

## 2019-09-03 PROCEDURE — 97110 THERAPEUTIC EXERCISES: CPT

## 2019-09-03 NOTE — PROGRESS NOTES
PT DAILY TREATMENT NOTE 10-18    Patient Name: Maryam Ojeda  Date:9/3/2019  : 1955  [x]  Patient  Verified  Payor: [facility] MEDICAID / Plan: [address] [place] [address] CCCP / Product Type: Managed Care Medicaid /    In time:805  Out time:844  Total Treatment Time (min): 39  Visit #: 10 of 16    Medicare/BCBS Only   Total Timed Codes (min):   1:1 Treatment Time:         Treatment Area: Low back pain [M54.5]    SUBJECTIVE  Pain Level (0-10 scale): 8  Any medication changes, allergies to medications, adverse drug reactions, diagnosis change, or new procedure performed?: [x] No    [] Yes (see summary sheet for update)  Subjective functional status/changes:   [] No changes reported  \"Yesterday I was having a lot of pain. \"    OBJECTIVE    Modality rationale:     Min Type Additional Details    [] Estim:  []Unatt       []IFC  []Premod                        []Other:  []w/ice   []w/heat  Position:  Location:    [] Estim: []Att    []TENS instruct  []NMES                    []Other:  []w/US   []w/ice   []w/heat  Position:  Location:    []  Traction: [] Cervical       []Lumbar                       [] Prone          []Supine                       []Intermittent   []Continuous Lbs:  [] before manual  [] after manual    []  Ultrasound: []Continuous   [] Pulsed                           []1MHz   []3MHz W/cm2:  Location:    []  Iontophoresis with dexamethasone         Location: [] Take home patch   [] In clinic    []  Ice     []  heat  []  Ice massage  []  Laser   []  Anodyne Position:  Location:    []  Laser with stim  []  Other:  Position:  Location:    []  Vasopneumatic Device Pressure:       [] lo [] med [] hi   Temperature: [] lo [] med [] hi   [] Skin assessment post-treatment:  []intact []redness- no adverse reaction    []redness  adverse reaction:       min []Eval                  []Re-Eval       16 min Therapeutic Exercise:  [x] See flow sheet :   Rationale: increase ROM, increase strength and improve coordination to improve the patients ability to aid with increase tolerance to ADLs and activities    8 min Therapeutic Activity:  [x]  See flow sheet :   Rationale: increase ROM, increase strength and improve coordination  to improve the patients ability to aid with increase tolerance to ADLS and activities      min Neuromuscular Re-education:  []  See flow sheet :   Rationale:   to improve the patients ability to     20 min Manual Therapy:  STM DTM MFR TPR B Piriformis   Rationale: decrease pain, increase ROM, increase tissue extensibility and decrease trigger points to aid with increase tolerance to ADLS and activities      min Gait Training:  ___ feet with ___ device on level surfaces with ___ level of assist   Rationale: With   [x] TE   [x] TA   [] neuro   [] other: Patient Education: [x] Review HEP    [] Progressed/Changed HEP based on:   [x] positioning   [] body mechanics   [] transfers   [] heat/ice application    [x] other: FOTO, POC, MD note, request dry needling     Other Objective/Functional Measures: VC exercises and tech. Pain Level (0-10 scale) post treatment: <8    ASSESSMENT/Changes in Function: tolerated well. No change since onset of PT, she discussed wanting to see MD prior to any additional PT sessions but is ok with dry needling request being sent to MD with her update note as well. Patient will continue to benefit from skilled PT services to modify and progress therapeutic interventions, address functional mobility deficits, address ROM deficits, address strength deficits, analyze and address soft tissue restrictions, analyze and cue movement patterns, analyze and modify body mechanics/ergonomics, assess and modify postural abnormalities and instruct in home and community integration to attain remaining goals.      [x]  See Plan of Care  [x]  See progress note/recertification  []  See Discharge Summary         Progress towards goals / Updated goals:   Short Term Goals: To be accomplished in 3 weeks:  1. Patient will decrease pain during aggravating activities by 2 points on Verbal Analog Scale (Eval: 10/10) to increase participation on Activities of Daily Living such as bending and stooping.   CURRENT 4 8/15/19  8/22/19  8/30/19  8 with activities 9/4/19  2. Patient will demonstrate increased strength by ½ grade on MMT (Eval: 3) throughout core and lumbar spine to improve functional participation in such tasks as prolonged standing and sitting.   CURRENT        3.   Patient will improve sitting tolerance time by 10 min (Eval: 30 min) without increased pain.   CURRENT ongoing  And CCO pain 8/30/19  No change 9/4/19     Long Term Goals: To be accomplished in 6 weeks:  1. Patient will decrease pain during aggravating activities by 4 points on Verbal Analog Scale (Eval: 10/10) to increase participation on Activities of Daily Living such as squatting, lifting and carrying moderate to heavy items.   CURRENT 4 8/15/19   4 8/22/19 8/30/19 8 with activities 9/4/19  2. Patient will demonstrate increased strength by 1 grade on MMT (Eval: 3+) to improve functional participation in such tasks as standing and sitting for greater than 30 min.          3.Patient will achieve predicted FOTO scores (57, eval 50) to demonstrate decreased functional impairment to facilitate improved participation in activities of daily living, improve overall quality of life. CURRENT 49 9/4/19       PLAN  []  Upgrade activities as tolerated     []  Continue plan of care  []  Update interventions per flow sheet       []  Discharge due to:_  [x]  Other:_ send MD note,  Request dry needling approval. Place on hold pending patient attempts to move up October MD appointment .    Lyndon Ward, PT 9/3/2019  8:16 AM    Future Appointments   Date Time Provider Nicyk Kline   9/5/2019  5:00 PM Rayne Carrillo [facility]   9/6/2019  8:00 AM Italia Morales PTA [facility]   9/10/2019  8:30 AM Marquis Hager Eun OSPINA [facility]   9/11/2019  5:00 PM Italia Morales PTA [facility]   9/13/2019  8:00 AM Kiley Freitas, PT [facility]   9/16/2019  8:30 AM Kristine Kirby PTA [facility]   10/1/2019  9:30 AM IO NURSE VISIT [facility]   10/8/2019  1:30 PM Aurea Huggins MD [facility]

## 2019-09-03 NOTE — PROGRESS NOTES
[facility] [address] [id number] [facility], [address] [place] [address], [facility] [address]  [phone] fax    Physician Update  [x] Progress Note  [] Discharge Summary  Patient name: Jodie Lyn Start of Care: 19   Referral source: Cristi Day MD : 1955   Medical/Treatment Diagnosis: Low back pain [M54.5]  Payor: [facility] MEDICAID / Plan: [address] [place] [address] CCCP / Product Type: Managed Care Medicaid /  Onset Date:19     Prior Hospitalization: see medical history Provider#: [id number]   Medications: Verified on Patient Summary List    Comorbidities: patient reports: tobacco use, back pain, GI issues, HBP, Sleep dysfunction   Prior Level of Function: 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 Visits from Start of Care: 10    Missed Visits: 0    Status at Evaluation/Last Progress Note: evaluation and plan of care  Progress towards Goals: slow progress overall noting no change since beginning PT. FOTO slight decline to 49. She has HEP and is motivated to improve. She notes her coccyx issue may be a factor. She desires to see MD prior to continuing PT and will seek to move up her October appointment. She is also agreeable for trial of dry needling if MD approves. Thank you.    Goals: to be achieved in 16 total treatments :  Continue with EVAL goals    ASSESSMENT/RECOMMENDATIONS:  [x]Continue therapy per initial plan/protocol at a frequency of  2-3 x per week for 16 total treatments    [x]Continue therapy with the following recommended changes:dry needling consult sent to MD  []Discontinue therapy progressing towards or have reached established goals  []Discontinue therapy due to lack of appreciable progress towards goals  []Discontinue therapy due to lack of attendance or compliance  [x]Await Physician's recommendations/decisions regarding therapy  [x]Other:patient to try and move up MD appointment, agreed to be placed on hold til then. Thank you for this referral. Sofia Wasserman, PT 9/3/2019 8:20 AM  NOTE TO PHYSICIAN:  PLEASE COMPLETE THE ORDERS BELOW AND   FAX TO [facility]: [phone]  If you are unable to process this request in 24 hours please contact our office: [phone]    ? I have read the above report and request that my patient continue as recommended. ? I have read the above report and request that my patient continue therapy with the following changes/special instructions:_____________________________________  ? I have read the above report and request that my patient be discharged from therapy.     [de-identified] Signature:____________Date:_________TIME:________    [employer], Date and Time must be completed for valid certification **

## 2019-09-03 NOTE — PROGRESS NOTES
Request for use of Dry Needling/Intramuscular Manual Therapy  Patient: Jeanie Palomo     Referral Source: Jax Astudillo MD  Diagnosis: Low back pain [M54.5]      : 1955  Date of initial visit: 19   Attended visits: 10  Missed Visits: 0    Based on findings from the physical therapy examination and evaluation, the evaluating therapist believes the patientJeanie  would benefit from including Dry Needling as part of the plan of care. Dry needling is a treatment technique utilized in conjunction with other PT interventions to inactivate myofascial trigger points and the pain and dysfunction they cause. Dry Needling is an advanced procedure that requires additional training including greater than 54 hours of intensive course work. Physical Therapists at [address] [place] [address] are trained and/or certified through [employer] for their education. PROCEDURE:   Solid filament sterile needle (typically 0.3mm/30 gauge) inserted into a trigger point   Repeated movements inactivate the trigger points, taking 30-60 seconds per site   Typically consists of 1 dry needling session per week and a possible second treatment including muscle re-education, flexibility, strengthening and other manual techniques to facilitate the benefits of dry needling     BENEFITS:   Inactivation of trigger points   Decreased pain   Increased muscle length   Improved movement patterns   Restoration of function POTENTIAL RISKS:   Post-needling soreness   Infection   Bruising/bleeding   Penetration of a nerve   Pneumothorax   All treating PTs have been thoroughly educated in avoiding adverse reactions    If you agree with this recommendation, please sign this form and fax it to us at [phone]. If you have questions or concerns regarding dry needling or any other treatment we may be providing, please contact us at [phone].     Thank you for allowing us to assist in the care of your patient. Brina Contreras, PT    9/3/2019 9:11 AM     NOTE TO PHYSICIAN:  PLEASE COMPLETE THE ORDERS BELOW AND   FAX TO [facility]: [phone]  If you are unable to process this request in 24 hours please contact our office:   [phone]    I have read the above request and AGREE to the recommendation of including dry needling as part of the plan of care.       Physicians signature: _________________________Date: _________Time:________

## 2019-09-04 ENCOUNTER — TELEPHONE (OUTPATIENT)
Dept: INTERNAL MEDICINE CLINIC | Age: 64
End: 2019-09-04

## 2019-09-04 DIAGNOSIS — M25.551 PAIN OF BOTH HIP JOINTS: ICD-10-CM

## 2019-09-04 DIAGNOSIS — M25.552 PAIN OF BOTH HIP JOINTS: ICD-10-CM

## 2019-09-04 DIAGNOSIS — G89.29 CHRONIC BILATERAL LOW BACK PAIN WITH BILATERAL SCIATICA: Primary | ICD-10-CM

## 2019-09-04 DIAGNOSIS — M54.42 CHRONIC BILATERAL LOW BACK PAIN WITH BILATERAL SCIATICA: Primary | ICD-10-CM

## 2019-09-04 DIAGNOSIS — M54.41 CHRONIC BILATERAL LOW BACK PAIN WITH BILATERAL SCIATICA: Primary | ICD-10-CM

## 2019-09-04 NOTE — TELEPHONE ENCOUNTER
Would recommend referral to the spine center. Please see if she would be willing to proceed and will place referral to Dr. Nathaly Hernandez. Thanks.

## 2019-09-05 ENCOUNTER — APPOINTMENT (OUTPATIENT)
Dept: PHYSICAL THERAPY | Age: 64
End: 2019-09-05
Payer: MEDICAID

## 2019-09-05 NOTE — TELEPHONE ENCOUNTER
Patient willing to be referred to the [address] [place] [address] [address] place referral.

## 2019-09-06 ENCOUNTER — APPOINTMENT (OUTPATIENT)
Dept: PHYSICAL THERAPY | Age: 64
End: 2019-09-06
Payer: MEDICAID

## 2019-09-13 ENCOUNTER — HOSPITAL ENCOUNTER (OUTPATIENT)
Dept: PHYSICAL THERAPY | Age: 64
End: 2019-09-13
Payer: MEDICAID

## 2019-09-16 ENCOUNTER — APPOINTMENT (OUTPATIENT)
Dept: PHYSICAL THERAPY | Age: 64
End: 2019-09-16
Payer: MEDICAID

## 2019-09-18 ENCOUNTER — HOSPITAL ENCOUNTER (OUTPATIENT)
Dept: PHYSICAL THERAPY | Age: 64
Discharge: HOME | End: 2019-09-18
Payer: MEDICAID

## 2019-09-18 PROCEDURE — 97110 THERAPEUTIC EXERCISES: CPT

## 2019-09-18 PROCEDURE — 97112 NEUROMUSCULAR REEDUCATION: CPT

## 2019-09-18 NOTE — PROGRESS NOTES
PT DAILY TREATMENT NOTE 10-18    Patient Name: Ty Reid  Date:2019  : 1955  [x]  Patient  Verified  Payor: [facility] MEDICAID / Plan: [facility] [address] [facility] CCCP / Product Type: Managed Care Medicaid /    In time:130  Out time:220  Total Treatment Time (min): 50  Visit #: 11 of 16    Medicare/BCBS Only   Total Timed Codes (min):   1:1 Treatment Time:         Treatment Area: Low back pain [M54.5]    SUBJECTIVE  Pain Level (0-10 scale): 5  Any medication changes, allergies to medications, adverse drug reactions, diagnosis change, or new procedure performed?: [x] No    [] Yes (see summary sheet for update)  Subjective functional status/changes:   [] No changes reported  It hurts on both sides of my back and goes down into my butt.      OBJECTIVE    Modality rationale: decrease pain to improve the patients ability to tolerate post needle soreness   Min Type Additional Details    [] Estim:  []Unatt       []IFC  []Premod                        []Other:  []w/ice   []w/heat  Position:  Location:    [] Estim: []Att    []TENS instruct  []NMES                    []Other:  []w/US   []w/ice   []w/heat  Position:  Location:    []  Traction: [] Cervical       []Lumbar                       [] Prone          []Supine                       []Intermittent   []Continuous Lbs:  [] before manual  [] after manual    []  Ultrasound: []Continuous   [] Pulsed                           []1MHz   []3MHz W/cm2:  Location:    []  Iontophoresis with dexamethasone         Location: [] Take home patch   [] In clinic   10 [x]  Ice     []  heat  []  Ice massage  []  Laser   []  Anodyne Position:  Location:    []  Laser with stim  []  Other:  Position:  Location:    []  Vasopneumatic Device Pressure:       [] lo [] med [] hi   Temperature: [] lo [] med [] hi   [] Skin assessment post-treatment:  []intact []redness- no adverse reaction      10 min Therapeutic Exercise:  [] See flow sheet :   Rationale: increase ROM and increase strength to improve the patients ability to perform daily activiites     30 min Neuromuscular Re-education:  []  See flow sheet :   Rationale: increase ROM and increase strength  to improve the patients ability to shut down paraspinals  Dry Needling Procedure Note    Procedure: An intramuscular manual therapy (dry needling) and a neuro-muscular re-education treatment was done to deactivate myofascial trigger points with a 30 gauge filament needle under aseptic technique. Indications:  [x] Myofascial pain and dysfunction [] Muscled spasms  [x] Myalgia/myositis   [] Muscle cramps  [x] Muscle imbalances  [] Other:    Chart reviewed for the following:  ITherese PT, have reviewed the medical history, summary list and precautions/contraindications for [employer].   TIME OUT performed immediately prior to start of procedure:  Therese MELVIN PT, have performed the following reviews on [employer] prior to the start of the session:      [x] Verified patient identification by name and date of birth    [x] Agreement on all muscles being treated was verified   [x] Purpose of dry needling, side effects, possible complications, risks and benefits were explained to the patient   [x] Procedure site(s) verified  [x] Patient was positioned for comfort and draped for privacy  [x] Informed Consent was signed (initial visit) and verified verbally (subsequent visits)  [x] Patient was instructed on the need to report the use of blood thinners and/or immunosuppressant medications  [x] How to respond to possible adverse effects of treatment  [x] Self treatment of post needling soreness: ice, heat (moist heat, heat wraps) and stretching  [x] Opportunity was given to ask any questions, all questions were answered            Time: 130  Date of procedure: 9/18/2019    Treatment: The following muscles were treated today with intramuscular dry needling  [] Left [] Right Abdominals: Ant Rectus Abdominis  [] Left [] Right External Oblique / Internal Oblique / Transverse Abdominis  [] Left [x] Right Thoracic Multifidi / Rotatores  [] Left [x] Right Iliocostalis Thoracis / Lumborum  [] Left [x] Right Longissimus Thoracis / Lumborum  [] Left [x] Right Lumbar Multifidi  [] Left [] Right Serratus Posterior Inferior  [] Left [] Right Quadratus Lumborum  [] Left [] Right Psoas  [] Left [] Right Iliacus  [] Left [] Right Iliopsoas (inguinal)  [] Left [x] Right Piriformis  [] Left [x] Right Quadratus Femoris  [] Left [x] Right [place] Flaquita / Yoli Tafoya / Derek  [] Left [] Right Obturator Internus  [] Left [x] Right Obturator Externus / Cynthia Carr / Inferior Gemellus    [] Left [] Right Tensor Fasciae Renea  [] Left [] Right Iliotibial Band  [] Left [] Right Pectineus  [] Left [] Right Sartorius  [] Left [] Right Gracilis  [] Left [] Right Adductor Brevis / Adductor Longus / Adductor Demar  [] Left [] Right Rectus Femoris / Vastus Lateralis / Vastus Intermedius / Vastus Medialis Obliquus  [] Left [] Right Tibialis Anterior / Posterior  [] Left [] Right Extensor Digitorum Longus / Brevis  [] Left [] Right Extensor Hallucis Longus / Brevis  [] Left [] Right Hamstrings: Biceps Femoris / Alleen Lab / Semimembranosis  [] Left [] Right Popliteus / Planteris  [] Left [] Right Gastrocnemius Medial / Lateral  [] Left [] Right Soleus  [] Left [] Right Peronei: Longus / Jennifer Bong / Tertius  [] Left [] Right Flexor Digitorum Longus / Brevis  [] Left [] Right Flexor Hallucis Longus / Brevis  [] Left [] Right Quadratus Plantae  [] Left [] Right Abductor Digiti Minimi  [] Left [] Right Foot Interossei  [] Left [] Right Other:    Patient's response to today's treatment:  [x] Latent Twitch Response  [x] Muscle relaxation [x] Pain Relief  [x] Post needling soreness [x] without complications  [] Increased Range of Motion  [] Other:     Performed by: Tonia Martinez PT         With   [] TE   [] TA   [] neuro   [] other: Patient Education: [x] Review HEP    [] Progressed/Changed HEP based on:   [] positioning   [] body mechanics   [] transfers   [] heat/ice application    [] other:      Other Objective/Functional Measures: initiated DN     Pain Level (0-10 scale) post treatment: 0    ASSESSMENT/Changes in Function: Excellent tolerance to DN. Able to elicit multiple twitches in all mm needled. Only complaint is post needle soreness post soreness. Patient will continue to benefit from skilled PT services to modify and progress therapeutic interventions, address functional mobility deficits, address ROM deficits, address strength deficits, analyze and address soft tissue restrictions, analyze and cue movement patterns and assess and modify postural abnormalities to attain remaining goals. []  See Plan of Care  []  See progress note/recertification  []  See Discharge Summary         Progress towards goals / Updated goals:   Short Term Goals: To be accomplished in 3 weeks:  1. Patient will decrease pain during aggravating activities by 2 points on Verbal Analog Scale (Eval: 10/10) to increase participation on Activities of Daily Living such as bending and stooping.   CURRENT 4 8/15/19  8/22/19  8/30/19  8 with activities 9/4/19  2. Patient will demonstrate increased strength by ½ grade on MMT (Eval: 3) throughout core and lumbar spine to improve functional participation in such tasks as prolonged standing and sitting.   CURRENT        3.   Patient will improve sitting tolerance time by 10 min (Eval: 30 min) without increased pain.   CURRENT ongoing  And CCO pain 8/30/19  No change 9/4/19     Long Term Goals: To be accomplished in 6 weeks:  1.  Patient will decrease pain during aggravating activities by 4 points on Verbal Analog Scale (Eval: 10/10) to increase participation on Activities of Daily Living such as squatting, lifting and carrying moderate to heavy items.   CURRENT 4 8/15/19   4 8/22/19 8/30/19 8 with activities 9/4/19  2. Patient will demonstrate increased strength by 1 grade on MMT (Eval: 3+) to improve functional participation in such tasks as standing and sitting for greater than 30 min.          3.Patient will achieve predicted FOTO scores (57, eval 50) to demonstrate decreased functional impairment to facilitate improved participation in activities of daily living, improve overall quality of life.                CURRENT 49 9/4/19       PLAN  []  Upgrade activities as tolerated     []  Continue plan of care  []  Update interventions per flow sheet       []  Discharge due to:_  []  Other:_      Joselin Macdonald, MPT, CMTPT 9/18/2019  9:30 AM    Future Appointments   Date Time Provider Nicky Kline   9/18/2019  1:30 PM [facility] 3 [facility]   10/1/2019  9:30 AM IO NURSE VISIT [facility]   10/2/2019 11:30 AM [facility] PT [place] [facility]   10/8/2019  1:30 PM Ekta Connor MD [facility] [address]   10/17/2019  9:00 AM Joselin Tiwari  [address]

## 2019-09-26 ENCOUNTER — HOSPITAL ENCOUNTER (OUTPATIENT)
Dept: PHYSICAL THERAPY | Age: 64
Discharge: HOME | End: 2019-09-26
Payer: MEDICAID

## 2019-09-26 PROCEDURE — 97110 THERAPEUTIC EXERCISES: CPT

## 2019-09-26 PROCEDURE — 97140 MANUAL THERAPY 1/> REGIONS: CPT

## 2019-09-26 NOTE — PROGRESS NOTES
PT DAILY TREATMENT NOTE     Patient Name: Elizabeth Valladares  Date:2019  : 1955  [x]  Patient  Verified  Payor: [facility] MEDICAID / Plan: [address] [facility] CCCP / Product Type: Managed Care Medicaid /    In time:3:01  Out time:3:35  Total Treatment Time (min): 34  Visit #: 2 of 16    Treatment Area: Low back pain [M54.5]    SUBJECTIVE  Pain Level (0-10 scale): 4  Any medication changes, allergies to medications, adverse drug reactions, diagnosis change, or new procedure performed?: [x] No    [] Yes (see summary sheet for update)  Subjective functional status/changes:   [] No changes reported  \"About the same as I was\"    OBJECTIVE    Modality rationale: PD to improve the patients ability to    Min Type Additional Details    [] Estim:  []Unatt       []IFC  []Premod                        []Other:  []w/ice   []w/heat  Position:  Location:    [] Estim: []Att    []TENS instruct  []NMES                    []Other:  []w/US   []w/ice   []w/heat  Position:  Location:    []  Traction: [] Cervical       []Lumbar                       [] Prone          []Supine                       []Intermittent   []Continuous Lbs:  [] before manual  [] after manual    []  Ultrasound: []Continuous   [] Pulsed                           []1MHz   []3MHz W/cm2:  Location:    []  Iontophoresis with dexamethasone         Location: [] Take home patch   [] In clinic    []  Ice     []  heat  []  Ice massage  []  Laser   []  Anodyne Position:  Location:    []  Laser with stim  []  Other:  Position:  Location:    []  Vasopneumatic Device Pressure:       [] lo [] med [] hi   Temperature: [] lo [] med [] hi   [] Skin assessment post-treatment:  []intact []redness- no adverse reaction    []redness  adverse reaction:       16 min Therapeutic Exercise:  [] See flow sheet :   Rationale: increase ROM and increase strength to improve the patients ability to perform daily tasks with increased ease    18 min Manual Therapy:  NORA palpation, setup and hemostasis, Lumbar PA's   Rationale: decrease pain, increase ROM and increase tissue extensibility to improve ease of ADL performance    Dry Needling Procedure Note    Procedure: An intramuscular manual therapy (dry needling) and a neuro-muscular re-education treatment was done to deactivate myofascial trigger points with a 30 gauge filament needle under aseptic technique. Indications:  [x] Myofascial pain and dysfunction [] Muscled spasms  [x] Myalgia/myositis   [] Muscle cramps  [x] Muscle imbalances  [] Other:    Chart reviewed for the following:  Hira MELVIN, have reviewed the medical history, summary list and precautions/contraindications for [employer].   TIME OUT performed immediately prior to start of procedure:  Hira MELVIN, have performed the following reviews on [employer] prior to the start of the session:      [x] Verified patient identification by name and date of birth    [x] Agreement on all muscles being treated was verified   [x] Purpose of dry needling, side effects, possible complications, risks and benefits were explained to the patient   [x] Procedure site(s) verified  [x] Patient was positioned for comfort and draped for privacy  [x] Informed Consent was signed (initial visit) and verified verbally (subsequent visits)  [] Patient was instructed on the need to report the use of blood thinners and/or immunosuppressant medications  [] How to respond to possible adverse effects of treatment  [] Self treatment of post needling soreness: ice, heat (moist heat, heat wraps) and stretching  [x] Opportunity was given to ask any questions, all questions were answered            Time: 3:08  Date of procedure: 9/26/2019    Treatment: The following muscles were treated today with intramuscular dry needling  [] Left [] Right Abdominals: Ant Rectus Abdominis  [] Left [] Right External Oblique / Internal Oblique / Transverse Abdominis  [] Left [] Right Thoracic Multifidi / Rotatores  [] Left [] Right Iliocostalis Thoracis / Lumborum  [x] Left [] Right Longissimus Thoracis / Lumborum  [x] Left [] Right Lumbar Multifidi  [] Left [] Right Serratus Posterior Inferior  [] Left [] Right Quadratus Lumborum  [] Left [] Right Psoas  [] Left [] Right Iliacus  [] Left [] Right Iliopsoas (inguinal)  [x] Left [] Right Piriformis  [x] Left [] Right Quadratus Femoris  [] Left [] Right Sintia Mini / Adalberto Jamila / Aram Manus  [] Left [] Right Obturator Internus  [x] Left [] Right Obturator Externus / Helen Guard / Inferior Gemellus    [] Left [] Right Tensor Fasciae Renea  [] Left [] Right Iliotibial Band  [] Left [] Right Pectineus  [] Left [] Right Sartorius  [] Left [] Right Gracilis  [] Left [] Right Adductor Brevis / Adductor Longus / Adductor Demar  [] Left [] Right Rectus Femoris / Vastus Lateralis / Vastus Intermedius / Vastus Medialis Obliquus  [] Left [] Right Tibialis Anterior / Posterior  [] Left [] Right Extensor Digitorum Longus / Brevis  [] Left [] Right Extensor Hallucis Longus / Brevis  [] Left [] Right Hamstrings: Biceps Femoris / Earlyne Arnold / Semimembranosis  [] Left [] Right Popliteus / Planteris  [] Left [] Right Gastrocnemius Medial / Lateral  [] Left [] Right Soleus  [] Left [] Right Peronei: Longus / Stephanie Nissen / Tertius  [] Left [] Right Flexor Digitorum Longus / Brevis  [] Left [] Right Flexor Hallucis Longus / Brevis  [] Left [] Right Quadratus Plantae  [] Left [] Right Abductor Digiti Minimi  [] Left [] Right Foot Interossei  [] Left [] Right Other:    Patient's response to today's treatment:  [x] Latent Twitch Response  [] Muscle relaxation [] Pain Relief  [x] Post needling soreness [x] without complications  [] Increased Range of Motion  [] Other:     Performed by:  Leita Holter DPT, CMTPT          With   [] TE   [] TA   [] neuro   [] other: Patient Education: [x] Review HEP    [] Progressed/Changed HEP based on:   [] positioning   [] body mechanics   [] transfers   [] heat/ice application    [] other:      Other Objective/Functional Measures:      Pain Level (0-10 scale) post treatment: 4    ASSESSMENT/Changes in Function: Pt with good tolerance to second DN session today, a bit sore through left lumbar paraspinals. Pt reports minimal change in condition to date, continue PT progressing segmental mobility and core strength    Patient will continue to benefit from skilled PT services to modify and progress therapeutic interventions, address functional mobility deficits, address ROM deficits, address strength deficits, analyze and address soft tissue restrictions, analyze and cue movement patterns and analyze and modify body mechanics/ergonomics to attain remaining goals. []  See Plan of Care  []  See progress note/recertification  []  See Discharge Summary         Short Term Goals: To be accomplished in 3 weeks:  1. Patient will decrease pain during aggravating activities by 2 points on Verbal Analog Scale (Eval: 10/10) to increase participation on Activities of Daily Living such as bending and stooping.   CURRENT 4 8/15/19  8/22/19  8/30/19  8 with activities 9/4/19  2. Patient will demonstrate increased strength by ½ grade on MMT (Eval: 3) throughout core and lumbar spine to improve functional participation in such tasks as prolonged standing and sitting.   CURRENT        3.   Patient will improve sitting tolerance time by 10 min (Eval: 30 min) without increased pain.   CURRENT ongoing  And CCO pain 8/30/19  No change 9/4/19     Long Term Goals: To be accomplished in 6 weeks:  1. Patient will decrease pain during aggravating activities by 4 points on Verbal Analog Scale (Eval: 10/10) to increase participation on Activities of Daily Living such as squatting, lifting and carrying moderate to heavy items.   CURRENT 4 8/15/19   4 8/22/19 8/30/19 8 with activities 9/4/19; unchanged at this time 9/26/19  2.  Patient will demonstrate increased strength by 1 grade on MMT (Eval: 3+) to improve functional participation in such tasks as standing and sitting for greater than 30 min.          3.Patient will achieve predicted FOTO scores (57, eval 50) to demonstrate decreased functional impairment to facilitate improved participation in activities of daily living, improve overall quality of life.               EMNAFIA 66 9/4/19    PLAN  []  Upgrade activities as tolerated     [x]  Continue plan of care  []  Update interventions per flow sheet       []  Discharge due to:_  []  Other:_      Arvell Notice DPT, CMTPT 9/26/2019  4:19 PM    Future Appointments   Date Time Provider Nicky Kline   9/30/2019 10:00 AM Nnamdi Cordero [facility]   10/1/2019  9:30 AM IO NURSE VISIT [facility] YOLANDA BROWN   10/4/2019  9:00 [facility]   10/8/2019 10:00 AM [facility]   10/8/2019  1:30 PM Micki Lowe MD [facility]   10/11/2019  8:30 AM [facility]   10/15/2019  2:00 PM [facility]   10/17/2019  9:00 AM Tamara Poole  [address]    10/18/2019  8:30 AM [facility]

## 2019-10-01 ENCOUNTER — APPOINTMENT (OUTPATIENT)
Dept: INTERNAL MEDICINE CLINIC | Age: 64
End: 2019-10-01

## 2019-10-02 ENCOUNTER — APPOINTMENT (OUTPATIENT)
Dept: PHYSICAL THERAPY | Age: 64
End: 2019-10-02
Payer: COMMERCIAL

## 2019-10-02 LAB
25(OH)D3 SERPL-MCNC: 37 NG/ML (ref 30–100)
ALB/GLOBRATIO, 58C: 1.6 (CALC) (ref 1–2.5)
ALBUMIN SERPL-MCNC: 4.2 G/DL (ref 3.6–5.1)
ALP SERPL-CCNC: 85 U/L (ref 33–130)
ALT SERPL-CCNC: 15 U/L (ref 6–29)
AST SERPL W P-5'-P-CCNC: 18 U/L (ref 10–35)
BILIRUB SERPL-MCNC: 0.3 MG/DL (ref 0.2–1.2)
BUN SERPL-MCNC: 14 MG/DL (ref 7–25)
BUN/CREATININE RATIO,BUCR: NORMAL (CALC) (ref 6–22)
CALCIUM SERPL-MCNC: 9.5 MG/DL (ref 8.6–10.4)
CHLORIDE SERPL-SCNC: 103 MMOL/L (ref 98–110)
CHOL/HDL RATIO,CHHDX: 3.5 (CALC)
CHOLEST SERPL-MCNC: 155 MG/DL
CO2 SERPL-SCNC: 24 MMOL/L (ref 20–32)
COMMENT, 25003510: ABNORMAL
CREAT SERPL-MCNC: 0.74 MG/DL (ref 0.5–0.99)
CREATININE URINE,9612018: 38 MG/DL (ref 20–275)
GLOBULIN,GLOB: 2.7 G/DL (CALC) (ref 1.9–3.7)
GLUCOSE SERPL-MCNC: 86 MG/DL (ref 65–99)
HDLC SERPL-MCNC: 44 MG/DL
LDL-CHOLESTEROL: 95 MG/DL (CALC)
MICROALBUMIN,URINE RANDOM 140054: <0.2 MG/DL
MICROALBUMIN/CREAT RATIO: NORMAL MCG/MG CREAT
NON-HDL CHOLESTEROL, 011976: 111 MG/DL (CALC)
POTASSIUM SERPL-SCNC: 4.7 MMOL/L (ref 3.5–5.3)
PROT SERPL-MCNC: 6.9 G/DL (ref 6.1–8.1)
SODIUM SERPL-SCNC: 140 MMOL/L (ref 135–146)
SPECIMEN INTEGRITY COMPROMISED: NORMAL
TRIGL SERPL-MCNC: 73 MG/DL (ref ?–150)

## 2019-10-04 ENCOUNTER — HOSPITAL ENCOUNTER (OUTPATIENT)
Dept: PHYSICAL THERAPY | Age: 64
Discharge: HOME | End: 2019-10-04
Payer: COMMERCIAL

## 2019-10-04 PROCEDURE — 97110 THERAPEUTIC EXERCISES: CPT

## 2019-10-04 PROCEDURE — 97112 NEUROMUSCULAR REEDUCATION: CPT

## 2019-10-04 NOTE — PROGRESS NOTES
PT DAILY TREATMENT NOTE 10-18    Patient Name: Jia Nunez  952Date:10/4/2019  : 1955  [x]  Patient  Verified  Payor: Frank Myles / Plan: [facility] CCCP / Product Type: Managed Care Medicaid /    In time:900  Out time:953  Total Treatment Time (min): 53  Visit #: 3 of 16    Medicare/BCBS Only   Total Timed Codes (min):   1:1 Treatment Time:         Treatment Area: Low back pain [M54.5]    SUBJECTIVE  Pain Level (0-10 scale): 2  Any medication changes, allergies to medications, adverse drug reactions, diagnosis change, or new procedure performed?: [x] No    [] Yes (see summary sheet for update)  Subjective functional status/changes:   [] No changes reported  It feels like it's spreading out to the sides of my hips.      OBJECTIVE    Modality rationale: decrease pain to improve the patients ability to tolerate post needle soreness   Min Type Additional Details    [] Estim:  []Unatt       []IFC  []Premod                        []Other:  []w/ice   []w/heat  Position:  Location:    [] Estim: []Att    []TENS instruct  []NMES                    []Other:  []w/US   []w/ice   []w/heat  Position:  Location:    []  Traction: [] Cervical       []Lumbar                       [] Prone          []Supine                       []Intermittent   []Continuous Lbs:  [] before manual  [] after manual    []  Ultrasound: []Continuous   [] Pulsed                           []1MHz   []3MHz W/cm2:  Location:    []  Iontophoresis with dexamethasone         Location: [] Take home patch   [] In clinic   10 [x]  Ice     []  heat  []  Ice massage  []  Laser   []  Anodyne Position:prone  Location:L/S and glutes    []  Laser with stim  []  Other:  Position:  Location:    []  Vasopneumatic Device Pressure:       [] lo [] med [] hi   Temperature: [] lo [] med [] hi   [] Skin assessment post-treatment:  []intact []redness- no adverse reaction        10 min Therapeutic Exercise:  [] See flow sheet :   Rationale: increase ROM and increase strength to improve the patients ability to perform daily activities    33 min Neuromuscular Re-education:  []  See flow sheet :   Rationale: increase ROM and increase strength  to improve the patients ability to tolerate prolonged positions  Dry Needling Procedure Note    Procedure: An intramuscular manual therapy (dry needling) and a neuro-muscular re-education treatment was done to deactivate myofascial trigger points with a 30 gauge filament needle under aseptic technique. Indications:  [x] Myofascial pain and dysfunction [] Muscled spasms  [x] Myalgia/myositis   [] Muscle cramps  [x] Muscle imbalances  [] Other:    Chart reviewed for the following:  Natalie MELVIN PT, have reviewed the medical history, summary list and precautions/contraindications for [employer].   TIME OUT performed immediately prior to start of procedure:  Natalie MELVIN PT, have performed the following reviews on [employer] prior to the start of the session:      [x] Verified patient identification by name and date of birth    [x] Agreement on all muscles being treated was verified   [x] Purpose of dry needling, side effects, possible complications, risks and benefits were explained to the patient   [x] Procedure site(s) verified  [x] Patient was positioned for comfort and draped for privacy  [x] Informed Consent was signed (initial visit) and verified verbally (subsequent visits)  [x] Patient was instructed on the need to report the use of blood thinners and/or immunosuppressant medications  [x] How to respond to possible adverse effects of treatment  [x] Self treatment of post needling soreness: ice, heat (moist heat, heat wraps) and stretching  [x] Opportunity was given to ask any questions, all questions were answered            Time: 908  Date of procedure: 10/4/2019    Treatment: The following muscles were treated today with intramuscular dry needling  [] Left [] Right Abdominals: Ant Rectus Abdominis  [] Left [] Right External Oblique / Internal Oblique / Transverse Abdominis  [] Left [] Right Thoracic Multifidi / Rotatores  [] Left [] Right Iliocostalis Thoracis / Lumborum  [x] Left [x] Right Longissimus Thoracis / Lumborum  [x] Left [x] Right Lumbar Multifidi  [] Left [] Right Serratus Posterior Inferior  [] Left [] Right Quadratus Lumborum  [] Left [] Right Psoas  [] Left [] Right Iliacus  [] Left [] Right Iliopsoas (inguinal)  [] Left [x] Right Piriformis  [] Left [] Right Quadratus Femoris  [x] Left [x] Right Gearldean Ligas / Marjorie Romance / Derek  [] Left [] Right Obturator Internus  [x] Left [x] Right Obturator Externus / Thom Conroe / Inferior Gemellus    [] Left [] Right Tensor Fasciae Renea  [] Left [] Right Iliotibial Band  [] Left [] Right Pectineus  [] Left [] Right Sartorius  [] Left [] Right Gracilis  [] Left [] Right Adductor Brevis / Adductor Longus / Adductor Demar  [] Left [] Right Rectus Femoris / Vastus Lateralis / Vastus Intermedius / Vastus Medialis Obliquus  [] Left [] Right Tibialis Anterior / Posterior  [] Left [] Right Extensor Digitorum Longus / Brevis  [] Left [] Right Extensor Hallucis Longus / Brevis  [] Left [] Right Hamstrings: Biceps Femoris / Cecillia Blank / Semimembranosis  [] Left [] Right Popliteus / Planteris  [] Left [] Right Gastrocnemius Medial / Lateral  [] Left [] Right Soleus  [] Left [] Right Peronei: Longus / Aria Pass / Tertius  [] Left [] Right Flexor Digitorum Longus / Brevis  [] Left [] Right Flexor Hallucis Longus / Brevis  [] Left [] Right Quadratus Plantae  [] Left [] Right Abductor Digiti Minimi  [] Left [] Right Foot Interossei  [] Left [] Right Other:    Patient's response to today's treatment:  [x] Latent Twitch Response  [x] Muscle relaxation [x] Pain Relief  [x] Post needling soreness [x] without complications  [] Increased Range of Motion  [] Other:     Performed by: Gabby Wasserman PT              With   [] TE [] TA   [] neuro   [] other: Patient Education: [x] Review HEP    [] Progressed/Changed HEP based on:   [] positioning   [] body mechanics   [] transfers   [] heat/ice application    [] other:      Other Objective/Functional Measures: FOTO 54      Pain Level (0-10 scale) post treatment: 0 \"sore\"    ASSESSMENT/Changes in Function: Slow progress to date. Patient will benefit from continuing with PT to further progress towards goals     Patient will continue to benefit from skilled PT services to modify and progress therapeutic interventions, address functional mobility deficits, address ROM deficits, address strength deficits, analyze and address soft tissue restrictions, analyze and cue movement patterns and assess and modify postural abnormalities to attain remaining goals. []  See Plan of Care  [x]  See progress note/recertification  []  See Discharge Summary         Progress towards goals / Updated goals:  Short Term Goals: To be accomplished in 3 weeks:  1. Patient will decrease pain during aggravating activities by 2 points on Verbal Analog Scale (Eval: 10/10) to increase participation on Activities of Daily Living such as bending and stooping.   CURRENT 4 8/15/19  8/22/19  8/30/19  8 with activities 9/4/19  2. Patient will demonstrate increased strength by ½ grade on MMT (Eval: 3) throughout core and lumbar spine to improve functional participation in such tasks as prolonged standing and sitting.   CURRENT        3.   Patient will improve sitting tolerance time by 10 min (Eval: 30 min) without increased pain.   CURRENT ongoing  And CCO pain 8/30/19  No change 9/4/19     Long Term Goals: To be accomplished in 6 weeks:  1.  Patient will decrease pain during aggravating activities by 4 points on Verbal Analog Scale (Eval: 10/10) to increase participation on Activities of Daily Living such as squatting, lifting and carrying moderate to heavy items.   CURRENT 4 8/15/19   4 8/22/19 8/30/19 8 with activities 9/4/19; unchanged at this time 9/26/19  2.  Patient will demonstrate increased strength by 1 grade on MMT (Eval: 3+) to improve functional participation in such tasks as standing and sitting for greater than 30 min.          3.Patient will achieve predicted FOTO scores (57, eval 50) to demonstrate decreased functional impairment to facilitate improved participation in activities of daily living, improve overall quality of life.               EILEBHO 49 9/4/19; 54       PLAN  []  Upgrade activities as tolerated     [x]  Continue plan of care  []  Update interventions per flow sheet       []  Discharge due to:_  []  Other:_      Alena Zaman, MPT, CMTPT 10/4/2019  9:25 AM    Future Appointments   Date Time Provider Nicky Gonzalezi   10/8/2019 10:00 AM Denae Villanueva, PT [facility]   10/8/2019  1:30 PM Isaias Thomas MD [facility]   10/11/2019  8:30 AM Royer Malone [facility]   10/15/2019  2:00 PM Denae Villanueva, PT [facility]   10/17/2019  9:00 AM Michel Singh  [id number]    10/18/2019  8:30 AM Royer Malone [facility]

## 2019-10-04 NOTE — PROGRESS NOTES
[facility]  [address]  [place], [address]  [phone] fax    Physical Therapy Progress Note  Patient name: Margarita Robert Start of Care: 19   Referral source: Michele Hendrickson MD : 1955   Medical/Treatment Diagnosis: Low back pain [M54.5]  Payor: 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 Visits from Start of Care: 13    Missed Visits: 0      Established Goals:        Excellent         Good         Limited            None  [x] Increased ROM   []  [x]  []  []  [x] Increased Strength  []  [x]  []  []  [x] Increased Mobility  []  [x]  []  []   [x] Decreased Pain   []  [x]  []  []  [] Decreased Swelling  []  []  []  []    Key Functional Changes: FOTO 54 (5 point gain)    Updated Goals: to be achieved in 4 weeks:  1. Improve FOTO to 57 to indicate improved function with daily activities. 2. Decrease mm restrictions in B glutes by 50% to improve contractile sufficiency for daily activities. 3. Report >50% improvement to enable patient to sit with minimal pain.        ASSESSMENT/RECOMMENDATIONS:  [x]Continue therapy per initial plan/protocol at a frequency of  2 x per week for 4 weeks  []Continue therapy with the following recommended changes:_____________________      _____________________________________________________________________  []Discontinue therapy progressing towards or have reached established goals  []Discontinue therapy due to lack of appreciable progress towards goals  []Discontinue therapy due to lack of attendance or compliance  []Await Physician's recommendations/decisions regarding therapy  []Other:________________________________________________________________    Thank you for this referral.    Shirley Pringle, PT 10/4/2019 9:50 AM  NOTE TO PHYSICIAN:  PLEASE COMPLETE THE ORDERS BELOW AND   FAX TO [facility]: [phone]  If you are unable to process this request in 24 hours please contact our office: [phone]    ? I have read the above report and request that my patient continue as recommended. ? I have read the above report and request that my patient continue therapy with the following changes/special instructions:__________________________________________________________  ? I have read the above report and request that my patient be discharged from therapy.     Physicians signature: ______________________________Date: ______Time:______

## 2019-10-08 ENCOUNTER — OFFICE VISIT (OUTPATIENT)
Dept: INTERNAL MEDICINE CLINIC | Age: 64
End: 2019-10-08

## 2019-10-08 ENCOUNTER — HOSPITAL ENCOUNTER (OUTPATIENT)
Dept: PHYSICAL THERAPY | Age: 64
Discharge: HOME | End: 2019-10-08
Payer: COMMERCIAL

## 2019-10-08 VITALS
BODY MASS INDEX: 24.15 KG/M2 | DIASTOLIC BLOOD PRESSURE: 82 MMHG | RESPIRATION RATE: 14 BRPM | TEMPERATURE: 98 F | HEIGHT: 60 IN | HEART RATE: 72 BPM | OXYGEN SATURATION: 97 % | WEIGHT: 123 LBS | SYSTOLIC BLOOD PRESSURE: 122 MMHG

## 2019-10-08 DIAGNOSIS — M79.2 CHRONIC PERIPHERAL NEUROPATHIC PAIN: ICD-10-CM

## 2019-10-08 DIAGNOSIS — M85.89 OSTEOPENIA OF MULTIPLE SITES: ICD-10-CM

## 2019-10-08 DIAGNOSIS — Z13.5 GLAUCOMA SCREENING: ICD-10-CM

## 2019-10-08 DIAGNOSIS — R73.03 PREDIABETES: ICD-10-CM

## 2019-10-08 DIAGNOSIS — M54.41 CHRONIC BILATERAL LOW BACK PAIN WITH BILATERAL SCIATICA: ICD-10-CM

## 2019-10-08 DIAGNOSIS — M15.9 GENERALIZED OSTEOARTHROSIS, INVOLVING MULTIPLE SITES: ICD-10-CM

## 2019-10-08 DIAGNOSIS — E55.9 VITAMIN D DEFICIENCY: ICD-10-CM

## 2019-10-08 DIAGNOSIS — G62.9 PERIPHERAL POLYNEUROPATHY: ICD-10-CM

## 2019-10-08 DIAGNOSIS — M54.42 CHRONIC BILATERAL LOW BACK PAIN WITH BILATERAL SCIATICA: ICD-10-CM

## 2019-10-08 DIAGNOSIS — K21.9 GASTROESOPHAGEAL REFLUX DISEASE, ESOPHAGITIS PRESENCE NOT SPECIFIED: ICD-10-CM

## 2019-10-08 DIAGNOSIS — E78.5 HYPERLIPIDEMIA, UNSPECIFIED HYPERLIPIDEMIA TYPE: ICD-10-CM

## 2019-10-08 DIAGNOSIS — K52.832 LYMPHOCYTIC COLITIS: ICD-10-CM

## 2019-10-08 DIAGNOSIS — G89.29 CHRONIC PERIPHERAL NEUROPATHIC PAIN: ICD-10-CM

## 2019-10-08 DIAGNOSIS — Z23 ENCOUNTER FOR IMMUNIZATION: ICD-10-CM

## 2019-10-08 DIAGNOSIS — I10 ESSENTIAL HYPERTENSION: Primary | ICD-10-CM

## 2019-10-08 DIAGNOSIS — F17.200 CURRENT EVERY DAY SMOKER: ICD-10-CM

## 2019-10-08 DIAGNOSIS — F10.21 PERSONAL HISTORY OF ALCOHOLISM (HCC): ICD-10-CM

## 2019-10-08 DIAGNOSIS — G89.29 CHRONIC BILATERAL LOW BACK PAIN WITH BILATERAL SCIATICA: ICD-10-CM

## 2019-10-08 PROCEDURE — 97112 NEUROMUSCULAR REEDUCATION: CPT

## 2019-10-08 PROCEDURE — 97110 THERAPEUTIC EXERCISES: CPT

## 2019-10-08 RX ORDER — BUPROPION HYDROCHLORIDE 150 MG/1
TABLET, EXTENDED RELEASE ORAL
Qty: 180 TAB | Refills: 1 | Status: SHIPPED | OUTPATIENT
Start: 2019-10-08 | End: 2019-10-14 | Stop reason: ALTCHOICE

## 2019-10-08 RX ORDER — GABAPENTIN 300 MG/1
300 CAPSULE ORAL 3 TIMES DAILY
COMMUNITY
End: 2019-10-22 | Stop reason: SDUPTHER

## 2019-10-08 NOTE — PATIENT INSTRUCTIONS
Vaccine Information Statement Influenza (Flu) Vaccine (Inactivated or Recombinant): What You Need to Know Many Vaccine Information Statements are available in Bulgarian and other languages. See www.immunize.org/vis Hojas de información sobre vacunas están disponibles en español y en muchos otros idiomas. Visite www.immunize.org/vis 1. Why get vaccinated? Influenza vaccine can prevent influenza (flu). Flu is a contagious disease that spreads around the [facility] every year, usually between October and May. Anyone can get the flu, but it is more dangerous for some people. Infants and young children, people 72years of age and older, pregnant women, and people with certain health conditions or a weakened immune system are at greatest risk of flu complications. Pneumonia, bronchitis, sinus infections and ear infections are examples of flu-related complications. If you have a medical condition, such as heart disease, cancer or diabetes, flu can make it worse. Flu can cause fever and chills, sore throat, muscle aches, fatigue, cough, headache, and runny or stuffy nose. Some people may have vomiting and diarrhea, though this is more common in children than adults. Each year thousands of people in the [facility] die from flu, and many more are hospitalized. Flu vaccine prevents millions of illnesses and flu-related visits to the doctor each year. 2. Influenza vaccines CDC recommends everyone 10months of age and older get vaccinated every flu season. Children 6 months through 6years of age may need 2 doses during a single flu season. Everyone else needs only 1 dose each flu season. It takes about 2 weeks for protection to develop after vaccination. There are many flu viruses, and they are always changing. Each year a new flu vaccine is made to protect against three or four viruses that are likely to cause disease in the upcoming flu season.  Even when the vaccine doesnt exactly match these viruses, it may still provide some protection. Influenza vaccine does not cause flu. Influenza vaccine may be given at the same time as other vaccines. 3. Talk with your health care provider Tell your vaccine provider if the person getting the vaccine: 
 Has had an allergic reaction after a previous dose of influenza vaccine, or has any severe, life-threatening allergies.  Has ever had Guillain-Barré Syndrome (also called GBS). In some cases, your health care provider may decide to postpone influenza vaccination to a future visit. People with minor illnesses, such as a cold, may be vaccinated. People who are moderately or severely ill should usually wait until they recover before getting influenza vaccine. Your health care provider can give you more information. 4. Risks of a reaction  Soreness, redness, and swelling where shot is given, fever, muscle aches, and headache can happen after influenza vaccine.  There may be a very small increased risk of Guillain-Barré Syndrome (GBS) after inactivated influenza vaccine (the flu shot). Meng Ramesh children who get the flu shot along with pneumococcal vaccine (PCV13), and/or DTaP vaccine at the same time might be slightly more likely to have a seizure caused by fever. Tell your health care provider if a child who is getting flu vaccine has ever had a seizure. People sometimes faint after medical procedures, including vaccination. Tell your provider if you feel dizzy or have vision changes or ringing in the ears. As with any medicine, there is a very remote chance of a vaccine causing a severe allergic reaction, other serious injury, or death. 5. What if there is a serious problem? An allergic reaction could occur after the vaccinated person leaves the clinic.  If you see signs of a severe allergic reaction (hives, swelling of the face and throat, difficulty breathing, a fast heartbeat, dizziness, or weakness), call 9-1-1 and get the person to the nearest hospital. 
 
For other signs that concern you, call your health care provider. Adverse reactions should be reported to the Vaccine Adverse Event Reporting System (VAERS). Your health care provider will usually file this report, or you can do it yourself. Visit the VAERS website at www.vaers. hhs.gov or call 0-777.548.8927. VAERS is only for reporting reactions, and VAERS staff do not give medical advice. 6. The National Vaccine Injury Compensation Program 
 
The [facility] Vaccine Injury Compensation Program (VICP) is a federal program that was created to compensate people who may have been injured by certain vaccines. Visit the VICP website at www.[facility].gov/vaccinecompensation or call 9-183.213.5169 to learn about the program and about filing a claim. There is a time limit to file a claim for compensation. 7. How can I learn more?  Ask your health care provider.  Call your local or state health department.  Contact the Centers for Disease Control and Prevention (CDC): 
- Call 2-121.421.7041 (2-890-CIE-INFO) or 
- Visit CDCs influenza website at www.cdc.gov/flu Vaccine Information Statement (Interim) Inactivated Influenza Vaccine 8/15/2019 
42 THERON [place] Eloy [id number] Department of [facility] and [employer] Centers for Disease Control and Prevention Office Use Only Learning About Benefits From Quitting Smoking How does quitting smoking make you healthier? If you're thinking about quitting smoking, you may have a few reasons to be smoke-free. Your health may be one of them. · When you quit smoking, you lower your risks for cancer, lung disease, heart attack, stroke, blood vessel disease, and blindness from macular degeneration. · When you're smoke-free, you get sick less often, and you heal faster. You are less likely to get colds, flu, bronchitis, and pneumonia. · As a nonsmoker, you may find that your mood is better and you are less stressed. When and how will you feel healthier? Quitting has real health benefits that start from day 1 of being smoke-free. And the longer you stay smoke-free, the healthier you get and the better you feel. The first hours · After just 20 minutes, your blood pressure and heart rate go down. That means there's less stress on your heart and blood vessels. · Within 12 hours, the level of carbon monoxide in your blood drops back to normal. That makes room for more oxygen. With more oxygen in your body, you may notice that you have more energy than when you smoked. After 2 weeks · Your lungs start to work better. · Your risk of heart attack starts to drop. After 1 month · When your lungs are clear, you cough less and breathe deeper, so it's easier to be active. · Your sense of taste and smell return. That means you can enjoy food more than you have since you started smoking. Over the years · After 1 year, your risk of heart disease is half what it would be if you kept smoking. · After 5 years, your risk of stroke starts to shrink. Within a few years after that, it's about the same as if you'd never smoked. · After 10 years, your risk of dying from lung cancer is cut by about half. And your risk for many other types of cancer is lower too. How would quitting help others in your life? When you quit smoking, you improve the health of everyone who now breathes in your smoke. · Their heart, lung, and cancer risks drop, much like yours. · They are sick less. For babies and small children, living smoke-free means they're less likely to have ear infections, pneumonia, and bronchitis. · If you're a woman who is or will be pregnant someday, quitting smoking means a healthier . · Children who are close to you are less likely to become adult smokers. Where can you learn more? Go to http://carolyne-fabiana.info/. Enter [id number] [id number] in the search box to learn more about \"Learning About Benefits From Quitting Smoking. \" Current as of: September 26, 2018 Content Version: 12.2 © 9699-7775 [employer]. Care instructions adapted under license by [employer] (which disclaims liability or warranty for this information). If you have questions about a medical condition or this instruction, always ask your healthcare professional. [place] [address] any warranty or liability for your use of this information. Stopping Smoking: Care Instructions Your Care Instructions Cigarette smokers crave the nicotine in cigarettes. Giving it up is much harder than simply changing a habit. Your body has to stop craving the nicotine. It is hard to quit, but you can do it. There are many tools that people use to quit smoking. You may find that combining tools works best for you. There are several steps to quitting. First you get ready to quit. Then you get support to help you. After that, you learn new skills and behaviors to become a nonsmoker. For many people, a necessary step is getting and using medicine. Your doctor will help you set up the plan that best meets your needs. You may want to attend a smoking cessation program to help you quit smoking. When you choose a program, look for one that has proven success. Ask your doctor for ideas. You will greatly increase your chances of success if you take medicine as well as get counseling or join a cessation program. 
Some of the changes you feel when you first quit tobacco are uncomfortable. Your body will miss the nicotine at first, and you may feel short-tempered and grumpy. You may have trouble sleeping or concentrating. Medicine can help you deal with these symptoms. You may struggle with changing your smoking habits and rituals. The last step is the tricky one: Be prepared for the smoking urge to continue for a time.  This is a lot to deal with, but keep at it. You will feel better. Follow-up care is a key part of your treatment and safety. Be sure to make and go to all appointments, and call your doctor if you are having problems. It's also a good idea to know your test results and keep a list of the medicines you take. How can you care for yourself at home? · Ask your family, friends, and coworkers for support. You have a better chance of quitting if you have help and support. · Join a support group, such as Nicotine Anonymous, for people who are trying to quit smoking. · Consider signing up for a smoking cessation program, such as the American Lung Association's Freedom from Smoking program. 
· Get text messaging support. Go to the website at www.smokefree. gov to sign up for the [facility] program. 
· Set a quit date. Pick your date carefully so that it is not right in the middle of a big deadline or stressful time. Once you quit, do not even take a puff. Get rid of all ashtrays and lighters after your last cigarette. Clean your house and your clothes so that they do not smell of smoke. · Learn how to be a nonsmoker. Think about ways you can avoid those things that make you reach for a cigarette. ? Avoid situations that put you at greatest risk for smoking. For some people, it is hard to have a drink with friends without smoking. For others, they might skip a coffee break with coworkers who smoke. ? Change your daily routine. Take a different route to work or eat a meal in a different place. · Cut down on stress. Calm yourself or release tension by doing an activity you enjoy, such as reading a book, taking a hot bath, or gardening. · Talk to your doctor or pharmacist about nicotine replacement therapy, which replaces the nicotine in your body. You still get nicotine but you do not use tobacco. Nicotine replacement products help you slowly reduce the amount of nicotine you need.  These products come in several forms, many of them available over-the-counter: ? Nicotine patches ? Nicotine gum and lozenges ? Nicotine inhaler · Ask your doctor about bupropion (Wellbutrin) or varenicline (Chantix), which are prescription medicines. They do not contain nicotine. They help you by reducing withdrawal symptoms, such as stress and anxiety. · Some people find hypnosis, acupuncture, and massage helpful for ending the smoking habit. · Eat a healthy diet and get regular exercise. Having healthy habits will help your body move past its craving for nicotine. · Be prepared to keep trying. Most people are not successful the first few times they try to quit. Do not get mad at yourself if you smoke again. Make a list of things you learned and think about when you want to try again, such as next week, next month, or next year. Where can you learn more? Go to http://carolyneWi-Chifabiana.info/. Enter U274 in the search box to learn more about \"Stopping Smoking: Care Instructions. \" Current as of: September 26, 2018 Content Version: 12.2 © 0483-2108 [employer]. Care instructions adapted under license by [employer] (which disclaims liability or warranty for this information). If you have questions about a medical condition or this instruction, always ask your healthcare professional. [address] any warranty or liability for your use of this information. Vaccine Information Statement Influenza (Flu) Vaccine (Inactivated or Recombinant): What You Need to Know Many Vaccine Information Statements are available in Portuguese and other languages. See www.immunize.org/vis Hojas de información sobre vacunas están disponibles en español y en muchos otros idiomas. Visite www.immunize.org/vis 1. Why get vaccinated? Influenza vaccine can prevent influenza (flu).  
 
Flu is a contagious disease that spreads around the [place] every year, usually between October and May. Anyone can get the flu, but it is more dangerous for some people. Infants and young children, people 72years of age and older, pregnant women, and people with certain health conditions or a weakened immune system are at greatest risk of flu complications. Pneumonia, bronchitis, sinus infections and ear infections are examples of flu-related complications. If you have a medical condition, such as heart disease, cancer or diabetes, flu can make it worse. Flu can cause fever and chills, sore throat, muscle aches, fatigue, cough, headache, and runny or stuffy nose. Some people may have vomiting and diarrhea, though this is more common in children than adults. Each year thousands of people in the [facility] die from flu, and many more are hospitalized. Flu vaccine prevents millions of illnesses and flu-related visits to the doctor each year. 2. Influenza vaccines CDC recommends everyone 10months of age and older get vaccinated every flu season. Children 6 months through 6years of age may need 2 doses during a single flu season. Everyone else needs only 1 dose each flu season. It takes about 2 weeks for protection to develop after vaccination. There are many flu viruses, and they are always changing. Each year a new flu vaccine is made to protect against three or four viruses that are likely to cause disease in the upcoming flu season. Even when the vaccine doesnt exactly match these viruses, it may still provide some protection. Influenza vaccine does not cause flu. Influenza vaccine may be given at the same time as other vaccines. 3. Talk with your health care provider Tell your vaccine provider if the person getting the vaccine: 
 Has had an allergic reaction after a previous dose of influenza vaccine, or has any severe, life-threatening allergies.  Has ever had Guillain-Barré Syndrome (also called GBS). In some cases, your health care provider may decide to postpone influenza vaccination to a future visit. People with minor illnesses, such as a cold, may be vaccinated. People who are moderately or severely ill should usually wait until they recover before getting influenza vaccine. Your health care provider can give you more information. 4. Risks of a reaction  Soreness, redness, and swelling where shot is given, fever, muscle aches, and headache can happen after influenza vaccine.  There may be a very small increased risk of Guillain-Barré Syndrome (GBS) after inactivated influenza vaccine (the flu shot). Melinda Days children who get the flu shot along with pneumococcal vaccine (PCV13), and/or DTaP vaccine at the same time might be slightly more likely to have a seizure caused by fever. Tell your health care provider if a child who is getting flu vaccine has ever had a seizure. People sometimes faint after medical procedures, including vaccination. Tell your provider if you feel dizzy or have vision changes or ringing in the ears. As with any medicine, there is a very remote chance of a vaccine causing a severe allergic reaction, other serious injury, or death. 5. What if there is a serious problem? An allergic reaction could occur after the vaccinated person leaves the clinic. If you see signs of a severe allergic reaction (hives, swelling of the face and throat, difficulty breathing, a fast heartbeat, dizziness, or weakness), call 9-1-1 and get the person to the nearest hospital. 
 
For other signs that concern you, call your health care provider. Adverse reactions should be reported to the Vaccine Adverse Event Reporting System (VAERS). Your health care provider will usually file this report, or you can do it yourself. Visit the VAERS website at www.vaers. hhs.gov or call 6-148.787.3200. VAERS is only for reporting reactions, and VAERS staff do not give medical advice. 6. The National Vaccine Injury Compensation Program 
 
The [facility] Vaccine Injury Compensation Program (VICP) is a federal program that was created to compensate people who may have been injured by certain vaccines. Visit the VICP website at www.hrsa.gov/vaccinecompensation or call 3-915.309.6482 to learn about the program and about filing a claim. There is a time limit to file a claim for compensation. 7. How can I learn more?  Ask your health care provider.  Call your local or state health department.  Contact the Centers for Disease Control and Prevention (CDC): 
- Call 1-261.394.2635 (1-800-CDC-INFO) or 
- Visit CDCs influenza website at www.cdc.gov/flu Vaccine Information Statement (Interim) Inactivated Influenza Vaccine 8/15/2019 
42 THERON Escudero [id number] Department of Health and eigital Centers for Disease Control and Prevention Office Use Only

## 2019-10-08 NOTE — PROGRESS NOTES
Chief Complaint   Patient presents with    Hypertension     2 month follow up with labs. Health Maintenance Due   Topic Date Due    Pneumococcal 0-64 years (1 of 1 - PPSV23) 04/15/1961    Shingrix Vaccine Age 50> (2 of 2) 05/21/2019    Influenza Age 5 to Adult  08/01/2019     Patient given influenza vaccine, FLUARIX, in left deltoied, per verbal order from Dr. Diego Wilks with read back. Instructed patient to sit and wait 10-20 minutes before leaving the premises so that we can watch for any complications or adverse reactions. Patient given vaccine information statement handout before vaccine was given. Patient tolerated well without adverse reactions or complications. 1. Have you been to the ER, urgent care clinic or hospitalized since your last visit? NO.     2. Have you seen or consulted any other health care providers outside of the [address] [place] [address] since your last visit (Include any pap smears or colon screening)? NO          Learning Assessment 8/1/2019   PRIMARY LEARNER Patient   HIGHEST LEVEL OF EDUCATION - PRIMARY LEARNER  SOME COLLEGE   BARRIERS PRIMARY LEARNER NONE   CO-LEARNER CAREGIVER No   PRIMARY LANGUAGE ENGLISH   LEARNER PREFERENCE PRIMARY DEMONSTRATION   ANSWERED BY patient   RELATIONSHIP SELF     Abuse Screening Questionnaire 10/8/2019   Do you ever feel afraid of your partner? N   Are you in a relationship with someone who physically or mentally threatens you? N   Is it safe for you to go home?  Geovany Valdez

## 2019-10-08 NOTE — PROGRESS NOTES
PT DAILY TREATMENT NOTE 10-18    Patient Name: Ranulfo Nash  Date:10/8/2019  : 1955  [x]  Patient  Verified  Payor: Russell Anaya / [place] [facility] [place] [facility] / Product Type: Managed Care Medicaid /    In time:1000  Out time:1050  Total Treatment Time (min): 50  Visit #: 1 of 8    Medicare/BCBS Only   Total Timed Codes (min):   1:1 Treatment Time:         Treatment Area: Low back pain [M54.5]    SUBJECTIVE  Pain Level (0-10 scale): 4  Any medication changes, allergies to medications, adverse drug reactions, diagnosis change, or new procedure performed?: [x] No    [] Yes (see summary sheet for update)  Subjective functional status/changes:   [] No changes reported  I still feel some tenderness on my left side.     OBJECTIVE    Modality rationale: decrease inflammation and decrease pain to improve the patients ability to tolerate post needle soreness   Min Type Additional Details    [] Estim:  []Unatt       []IFC  []Premod                        []Other:  []w/ice   []w/heat  Position:  Location:    [] Estim: []Att    []TENS instruct  []NMES                    []Other:  []w/US   []w/ice   []w/heat  Position:  Location:    []  Traction: [] Cervical       []Lumbar                       [] Prone          []Supine                       []Intermittent   []Continuous Lbs:  [] before manual  [] after manual    []  Ultrasound: []Continuous   [] Pulsed                           []1MHz   []3MHz W/cm2:  Location:    []  Iontophoresis with dexamethasone         Location: [] Take home patch   [] In clinic   10 [x]  Ice     []  heat  []  Ice massage  []  Laser   []  Anodyne Position:prone  Location:glutes    []  Laser with stim  []  Other:  Position:  Location:    []  Vasopneumatic Device Pressure:       [] lo [] med [] hi   Temperature: [] lo [] med [] hi   [] Skin assessment post-treatment:  []intact []redness- no adverse reaction        15 min Therapeutic Exercise:  [] See flow sheet : Rationale: increase ROM and increase strength to improve the patients ability to perform daily activities       25 min Neuromuscular Re-education:  []  See flow sheet :   Rationale: increase ROM and increase strength  to improve the patients ability to recruit TA and glutes for daily activities  Dry Needling Procedure Note    Procedure: An intramuscular manual therapy (dry needling) and a neuro-muscular re-education treatment was done to deactivate myofascial trigger points with a 30 gauge filament needle under aseptic technique. Indications:  [x] Myofascial pain and dysfunction [] Muscled spasms  [x] Myalgia/myositis   [] Muscle cramps  [x] Muscle imbalances  [] Other:    Chart reviewed for the following:  Whit MELVIN, PT, have reviewed the medical history, summary list and precautions/contraindications for [employer].   TIME OUT performed immediately prior to start of procedure:  Whit MELVIN, PT, have performed the following reviews on [employer] prior to the start of the session:      [x] Verified patient identification by name and date of birth    [x] Agreement on all muscles being treated was verified   [x] Purpose of dry needling, side effects, possible complications, risks and benefits were explained to the patient   [x] Procedure site(s) verified  [x] Patient was positioned for comfort and draped for privacy  [x] Informed Consent was signed (initial visit) and verified verbally (subsequent visits)  [x] Patient was instructed on the need to report the use of blood thinners and/or immunosuppressant medications  [x] How to respond to possible adverse effects of treatment  [x] Self treatment of post needling soreness: ice, heat (moist heat, heat wraps) and stretching  [x] Opportunity was given to ask any questions, all questions were answered            Time: 3561  Date of procedure: 10/8/2019    Treatment: The following muscles were treated today with intramuscular dry needling  [] Left [] Right Abdominals: Ant Rectus Abdominis  [] Left [] Right External Oblique / Internal Oblique / Transverse Abdominis  [x] Left [] Right Thoracic Multifidi / Rotatores  [x] Left [] Right Iliocostalis Thoracis / Lumborum  [x] Left [] Right Longissimus Thoracis / Lumborum  [x] Left [] Right Lumbar Multifidi  [] Left [] Right Serratus Posterior Inferior  [] Left [] Right Quadratus Lumborum  [] Left [] Right Psoas  [] Left [] Right Iliacus  [] Left [] Right Iliopsoas (inguinal)  [] Left [] Right Piriformis  [] Left [] Right Quadratus Femoris  [x] Left [x] Right Sherryn Carmen / Beacher Holter / Derek  [] Left [x] Right Obturator Internus  [x] Left [x] Right Obturator Externus / Dariusz Aid / Inferior Gemellus    [] Left [] Right Tensor Fasciae Renea  [] Left [] Right Iliotibial Band  [] Left [] Right Pectineus  [] Left [] Right Sartorius  [] Left [] Right Gracilis  [] Left [] Right Adductor Brevis / Adductor Longus / Adductor Demar  [] Left [] Right Rectus Femoris / Vastus Lateralis / Vastus Intermedius / Vastus Medialis Obliquus  [] Left [] Right Tibialis Anterior / Posterior  [] Left [] Right Extensor Digitorum Longus / Brevis  [] Left [] Right Extensor Hallucis Longus / Brevis  [] Left [] Right Hamstrings: Biceps Femoris / Yandy Baars / Semimembranosis  [] Left [] Right Popliteus / Planteris  [] Left [] Right Gastrocnemius Medial / Lateral  [] Left [] Right Soleus  [] Left [] Right Peronei: Longus / Lizeth Conklin / Tertius  [] Left [] Right Flexor Digitorum Longus / Brevis  [] Left [] Right Flexor Hallucis Longus / Brevis  [] Left [] Right Quadratus Plantae  [] Left [] Right Abductor Digiti Minimi  [] Left [] Right Foot Interossei  [] Left [] Right Other:    Patient's response to today's treatment:  [x] Latent Twitch Response  [x] Muscle relaxation [x] Pain Relief  [x] Post needling soreness [x] without complications  [x] Increased Range of Motion  [] Other:     Performed by: Whit Brandt PT With   [] TE   [] TA   [] neuro   [] other: Patient Education: [x] Review HEP    [] Progressed/Changed HEP based on:   [] positioning   [] body mechanics   [] transfers   [] heat/ice application    [] other:      Other Objective/Functional Measures:      Pain Level (0-10 scale) post treatment: 2    ASSESSMENT/Changes in Function: Able to reporduce tailbone pain with needling right obturator internus. Patient will continue to benefit from skilled PT services to modify and progress therapeutic interventions, address functional mobility deficits, address ROM deficits, address strength deficits, analyze and address soft tissue restrictions and analyze and cue movement patterns to attain remaining goals. []  See Plan of Care  []  See progress note/recertification  []  See Discharge Summary         Progress towards goals / Updated goals:  1. Improve FOTO to 57 to indicate improved function with daily activities. 2. Decrease mm restrictions in B glutes by 50% to improve contractile sufficiency for daily activities.   3. Report >50% improvement to enable patient to sit with minimal pain    PLAN  []  Upgrade activities as tolerated     [x]  Continue plan of care  []  Update interventions per flow sheet       []  Discharge due to:_  []  Other:_      CALI Mendez, CMTPT 10/8/2019  10:11 AM    Future Appointments   Date Time Provider Nicky Gonzalezi   10/8/2019  1:30 PM Chris Lofton MD [facility]   10/11/2019  8:30 AM Molly Marino [facility]   10/15/2019  2:00 PM Shana Hair, PT [facility]   10/17/2019  9:00 AM [facility]   10/18/2019  8:30 AM Chyrkaroline Marino [facility]

## 2019-10-11 ENCOUNTER — HOSPITAL ENCOUNTER (OUTPATIENT)
Dept: PHYSICAL THERAPY | Age: 64
Discharge: HOME | End: 2019-10-11
Payer: COMMERCIAL

## 2019-10-11 PROCEDURE — 97110 THERAPEUTIC EXERCISES: CPT

## 2019-10-11 PROCEDURE — 97140 MANUAL THERAPY 1/> REGIONS: CPT

## 2019-10-11 PROCEDURE — 97112 NEUROMUSCULAR REEDUCATION: CPT

## 2019-10-11 NOTE — PROGRESS NOTES
PT DAILY TREATMENT NOTE 10-18    Patient Name: Hakan Rousseau  Date:10/11/2019  : 1955  [x]  Patient  Verified  Payor: [facility] MEDICAID / Plan: [facility] [address] [facility] CCCP / Product Type: Managed Care Medicaid /    In time:8:32am  Out time:9:21am  Total Treatment Time (min): 49  Visit #: 2 of 8    Medicare/BCBS Only   Total Timed Codes (min):  49 1:1 Treatment Time:  28       Treatment Area: Low back pain [M54.5]    SUBJECTIVE  Pain Level (0-10 scale): 2/10  Any medication changes, allergies to medications, adverse drug reactions, diagnosis change, or new procedure performed?: [x] No    [] Yes (see summary sheet for update)  Subjective functional status/changes:   [] No changes reported  Pt reports some pain, more in her left buttocks today upon arrival.    OBJECTIVE  25 min Therapeutic Exercise:  [x] See flow sheet :   Rationale: increase ROM and increase strength to improve the patients ability to improve ease of daily activity. 12 min Neuromuscular Re-education:  [x]  See flow sheet :   Rationale: increase ROM, increase strength and improve coordination  to improve the patients ability to improve ease of daily activity. 12 min Manual Therapy:  Thoracolumbar and lumbar CPA mobs grade 2-3, B gluteal & piriformis & obturator STM/TPR emphasis on left side   Rationale: decrease pain, increase ROM, increase tissue extensibility and decrease trigger points to improve ease of daily activity.     With   [] TE   [] TA   [] neuro   [] other: Patient Education: [x] Review HEP    [] Progressed/Changed HEP based on:   [] positioning   [] body mechanics   [] transfers   [] heat/ice application    [] other:      Other Objective/Functional Measures: TTP b gluteals and hip rotators, trigger point noted in left side around piriformis vs obturator     Mild deficits in segmental lumbar and thoracic mobility with     Pain Level (0-10 scale) post treatment: 1    ASSESSMENT/Changes in Function: Pt demonstrates some limitations in thoracolumbar segmental mobility, but appears more limited 2/2 myofascial TTP and TrP in her gluteal/hip rotators, more so on the left side. She reports some reduction in pain post treatment reporting no adverse response to treatment. Patient will continue to benefit from skilled PT services to modify and progress therapeutic interventions, address functional mobility deficits, address ROM deficits, address strength deficits, analyze and address soft tissue restrictions, analyze and cue movement patterns and instruct in home and community integration to attain remaining goals. []  See Plan of Care  []  See progress note/recertification  []  See Discharge Summary         Progress towards goals / Updated goals:  1. Improve FOTO XO 10 DL indicate improved function with daily activities. 2. Decrease mm restrictions in B glutes by 50% to improve contractile sufficiency for daily activities.   3. Report >50% improvement to enable patient to sit with minimal pain    PLAN  []  Upgrade activities as tolerated     [x]  Continue plan of care  []  Update interventions per flow sheet       []  Discharge due to:_  []  Other:_      Awilda Cid, PT, DPT, ATC 10/11/2019  8:55 AM    Future Appointments   Date Time Provider Nicky Raisa   10/15/2019  2:00 PM Norma Hernandez PT [facility]   10/17/2019  9:00 AM Russell Harrington  [address]   10/18/2019  8:30 [facility]   1/21/2020  9:00 AM IOC NURSE VISIT [facility]   1/28/2020 10:30 AM Gloria Mojica MD [facility]

## 2019-10-12 NOTE — PROGRESS NOTES
HPI:   Maria E Poon is a 59y.o. year old female who presents today for a routine visit and for evaluation of hypertension, hyperlipidemia, prediabetes, microscopic colitis, GERD, osteopenia, peripheral neuropathy, and depression. She also has a prior history of alcoholism, but has been sober since 1988. She reports that she is doing reasonably well. She was last seen to [facility] on 8/1/2019, and reported difficulty with low back pain for the last 2 years, which she states first started while hiking the [employer] She stated that she experiences aching pain if sitting and ambulating, and reported that she is no longer able to play tennis due to the discomfort. She denied radiation of the pain into her legs, but she did report bilateral buttock pain. She stated that she underwent evaluation by Dr. Geetha Adams in 8/2018. At that time, her pain was mainly localized to her bilateral hips, and hip x-rays were essentially negative. He stated that her pain was likely musculoskeletal and advised conservative management. She stated that her pain had worsened while she was caring for her elderly parents, who have since passed away. Since that time, she tried massage, yoga, and chiropractor therapy without significant relief. She was prescribed gabapentin at bedtime by a prior physician, but had not found it to be of benefit. She underwent lumbar x-rays (3/2019) which showed mild disc space narrowing L4-L5 and L5-S1; and bilateral hip and pelvis x-rays (3/2019) which were normal. She denied any fevers, chills, weight change, saddle paresthesia, neurogenic bowel or bladder symptoms, or recent trauma. She was referred to physical therapy following her last visit and started on diclofenac, but did not find them to be helpful. However, she was transitioned to dry needling, and is finding some benefit since initiating. She has restarted gabapentin and finding some benefit.  She was also referred to the [id number] [place] [address] and has an upcoming appointment with Dr. Keyonna Valladares scheduled for next week. She is otherwise without new complaints today. She has a history of hypertension, treated with lisinopril and hydrochlorothiazide. She does not monitor her blood pressure at home. She remains very active, and denies any chest pain, shortness of breath at rest or with exertion, palpitations, lightheadedness, or edema. She has a history of hyperlipidemia, and was started on moderate intensity dose atorvastatin at her last visit in 8/2019 when baseline . She has a history of prediabetes, with elevated fasting blood sugar evident since at least 2016. HbA1c was first measured in 3/2019 and was elevated at 6.1. She denies any polyuria, polydipsia, nocturia, or blurry vision, and has no history of retinopathy or nephropathy. She has a long history of peripheral neuropathy, which may be painful at times. She states that it started over 30 years ago, but has remained stable without worsening. In 3/2019, evaluation included a normal B12 level. SPEP was also ordered, and showed a mild elevation of beta globulins, but no M-spike was observed. She has a history of GERD, which has been treated in the past with omeprazole. In 4/2018, she underwent colonoscopy by Dr. Sola Casarez for colon cancer screening as well as to evaluate diarrhea. It showed moderate sigmoid diverticulosis, a 5 mm sessile rectal polyp, and random biopsies were taken with pathology showing lymphocytic colitis. Follow-up recommended for 5 years. She was treated with budesonide with significant improvement. She states that she successfully weaned from taking budesonide for the last year, and has not had a recurrence of symptoms. She denies any abdominal pain, nausea, vomiting, melena, hematochezia, or change in bowel movements. She has a history of osteopenia with last bone density study on 8/20/2019 showing T-scores:  femoral neck left -1.7  /right -1.8 and lumbar -2.4 . She continues to take calcium and Vitamin D supplements. She has no history of pathologic fractures. She has a history of depression and alcoholism, but has remained sober since 1988. She continues to smoke 0.5 ppd and has been smoking for the last 40 years. She has been unsuccessful at stopping despite treatment with Chantix and Wellbutrin SR. She denies any cough or dyspnea. She has difficulty with insomnia and uses melatonin with good effect. Past Medical History:   Diagnosis Date    Depression     Essential hypertension     Generalized osteoarthrosis, involving multiple sites     GERD (gastroesophageal reflux disease)     Microscopic colitis 8/5/2019    Osteopenia of multiple sites 3/29/2019    Peripheral polyneuropathy 8/5/2019    Personal history of alcoholism ([facility] [address]     Prediabetes 04/10/2019     Past Surgical History:   Procedure Laterality Date    HX CHOLECYSTECTOMY      HX COLONOSCOPY  9/2010; 4/2018    ([facility]) diverticuli; 2nd colo showed polyp/lymphocytic colitis    HX TONSIL AND ADENOIDECTOMY      HX TUBAL LIGATION Bilateral      Current Outpatient Medications   Medication Sig    gabapentin (NEURONTIN) 300 mg capsule Take 300 mg by mouth three (3) times daily. Indications: one capsule at night    buPROPion SR (WELLBUTRIN SR) 150 mg SR tablet Begin 1 tab po daily for 3 days, then 1 tab po bid. Stop smoking on day 8.    atorvastatin (LIPITOR) 10 mg tablet Take 1 Tab by mouth daily.  lisinopril-hydroCHLOROthiazide (PRINZIDE, ZESTORETIC) 20-12.5 mg per tablet Take 1 Tab by mouth daily.  calcium carbonate-vitamin D3 1,000 mg(2,500 mg)-800 unit tab Take 1 Tab by mouth daily.  melatonin 10 mg cap Take 10 mg by mouth.  albuterol (PROVENTIL HFA, VENTOLIN HFA, PROAIR HFA) 90 mcg/actuation inhaler Take 1-2 Puffs by inhalation every four (4) hours as needed for Wheezing. No current facility-administered medications for this visit.       Allergies and Intolerances: Allergies   Allergen Reactions    Celebrex [Celecoxib] Rash and Palpitations     Family History: She has no family history of colon or breast cancer. Her mother had an MI at the age of 61, and her father had CAD and CHF. Family History   Problem Relation Age of Onset   [facility] Arthritis-osteo Mother     Ulcerative Colitis Mother     Breast Cancer Mother         breast, age70s    Heart Disease Father         chf    Hypertension Brother     Diabetes Brother     Elevated Lipids Brother     No Known Problems Brother     No Known Problems Brother      Social History:   She  reports that she has been smoking. She has a 20.00 pack-year smoking history. She quit smokeless tobacco use about 3 years ago. She continues to smoke 0.5 ppd x 40 years. Social History     Substance and Sexual Activity   Alcohol Use No    Alcohol/week: 0.0 standard drinks    Comment: quit 1988     Immunization History:  Immunization History   Administered Date(s) Administered    Influenza Vaccine (Quad) PF 09/09/2016, 09/18/2017, 11/08/2018, 10/08/2019    Td 08/15/2006    Tdap 09/18/2017    Zoster Recombinant 03/26/2019, 08/01/2019    Zoster Vaccine, Live 10/02/2017       Review of Systems:   As above included in HPI. Otherwise 11 point review of systems negative including constitutional, skin, HENT, eyes, respiratory, cardiovascular, gastrointestinal, genitourinary, musculoskeletal, endocrine, hematologic, allergy, and neurologic. Physical:   Vitals:   BP: 122/82  HR: 72  WT: 123 lb (55.8 kg)  BMI:  23.16    Exam:   Patient appears in no apparent distress. Affect is appropriate. HEENT: PERRLA, anicteric, oropharynx clear, no JVD, adenopathy or thyromegaly. No carotid bruits or radiated murmur. Lungs: clear to auscultation, no wheezes, rhonchi, or rales. Heart: regular rate and rhythm. No murmur, rubs, gallops  Abdomen: soft, nontender, nondistended, normal bowel sounds, no hepatosplenomegaly or masses.    Extremities: without edema.  Pulses 1-2+ bilaterally. Back: no tenderness to palpation over spine; negative straight leg raises bilaterally. Review of Data:  Labs:  No visits with results within 1 Month(s) from this visit. Latest known visit with results is:   Hospital Outpatient Visit on 08/01/2019   Component Date Value Ref Range Status    Hemoglobin A1c 08/01/2019 5.8* 4.2 - 5.6 % Final    Est. average glucose 08/01/2019 120  mg/dL Final    LIPID PROFILE 08/01/2019        Final    Cholesterol, total 08/01/2019 229* <200 MG/DL Final    Triglyceride 08/01/2019 149  <150 MG/DL Final    HDL Cholesterol 08/01/2019 41  40 - 60 MG/DL Final    LDL, calculated 08/01/2019 158.2* 0 - 100 MG/DL Final    VLDL, calculated 08/01/2019 29.8  MG/DL Final    CHOL/HDL Ratio 08/01/2019 5.6* 0 - 5.0   Final    Magnesium 08/01/2019 2.2  1.6 - 2.6 mg/dL Final    Sodium 08/01/2019 139  136 - 145 mmol/L Final    Potassium 08/01/2019 4.1  3.5 - 5.5 mmol/L Final    Chloride 08/01/2019 106  100 - 111 mmol/L Final    CO2 08/01/2019 28  21 - 32 mmol/L Final    Anion gap 08/01/2019 5  3.0 - 18 mmol/L Final    Glucose 08/01/2019 105* 74 - 99 mg/dL Final    BUN 08/01/2019 19* 7.0 - 18 MG/DL Final    Creatinine 08/01/2019 0.74  0.6 - 1.3 MG/DL Final    BUN/Creatinine ratio 08/01/2019 26* 12 - 20   Final    GFR est AA 08/01/2019 >60  >60 ml/min/1.73m2 Final    GFR est non-AA 08/01/2019 >60  >60 ml/min/1.73m2 Final    Calcium 08/01/2019 9.3  8.5 - 10.1 MG/DL Final    Bilirubin, total 08/01/2019 0.3  0.2 - 1.0 MG/DL Final    ALT (SGPT) 08/01/2019 22  13 - 56 U/L Final    AST (SGOT) 08/01/2019 18  10 - 38 U/L Final    Alk.  phosphatase 08/01/2019 91  45 - 117 U/L Final    Protein, total 08/01/2019 7.5  6.4 - 8.2 g/dL Final    Albumin 08/01/2019 4.5  3.4 - 5.0 g/dL Final    Globulin 08/01/2019 3.0  2.0 - 4.0 g/dL Final    A-G Ratio 08/01/2019 1.5  0.8 - 1.7   Final    Vitamin D 25-Hydroxy 08/01/2019 27.6* 30 - 100 ng/mL Final Calculated 10 year ASCVD risk score: 13 %    Health Maintenance:  Screening:    Mammogram: negative (2019)   PAP smear: well women exams with NP Carie Frances (last pap smear/HPV negative 2017)   Colorectal: colonoscopy (2018) Dr. Lolis Barrera. Due . Depression: none currently   DM (HbA1c/FPG): HbA1c 5.8 (2019)   Hepatitis C: negative (3/2019)   Falls: none   DEXA: osteopenia (2019)   Glaucoma: unknown   Smokin.5 ppd x 40 years   Vitamin D: 37 (10/2019)   Medicare Wellness: N/A   Physical: due 2020        Impression:  Patient Active Problem List   Diagnosis Code    Personal history of alcoholism ([facility] [address] F10.21    Generalized osteoarthrosis, involving multiple sites M15.9    GERD (gastroesophageal reflux disease) K21.9    Depression F32.9    Hyperlipidemia E78.5    Essential hypertension I10    Chronic peripheral neuropathic pain M79.2, G89.29    Osteopenia of multiple sites M85.89    Prediabetes R73.03    Chronic bilateral low back pain with bilateral sciatica M54.42, M54.41, G89.29    Vitamin D deficiency E55.9    Peripheral polyneuropathy G62.9    Current every day smoker F17.200    Microscopic colitis K52.839       Plan:  1. Hypertension. Blood pressure well controlled on current regimen of lisinopril 20 mg daily and hydrochlorothiazide 12.5 mg daily. Renal function has been normal with creatinine 0.74/ eGFR >60. Will continue to follow. 2. Hyperlipidemia. Calculated 10 year ASCVD risk 14 %, which places her in one of the four statin benefit groups as per new AHA/ACC guidelines (primary prevention: 10 year ASCVD risk >7.5%). LDL significantly elevated at 158 and HDL 41 (2019) and started on atorvastatin 10 mg daily. Repeat lipid panel today with LDL improved to 95 and HDL 44, which is reasonable improvement in this patient. Goal LDL <100 given no history of ASCVD. Emphasized importance of lifestyle modifications, including diet, exercise, and weight loss.  Will recheck lipid panel at next visit. Continue to follow. 3. Prediabetes. Elevated fasting blood sugar present since at least 2016, but HbA1c first checked in 3/2019 and found to be elevated at 6.1. Improved last visit to 5.8. No evidence of retinopathy or nephropathy, but does have long standing peripheral neuropathy. On Ace-I and started on statin. Will refer to Dr. Smiley Ro for eye exam. Foot exam essentially with normal sensation today to vibration and pin prick, but patient describes hypersensitivity and burning of the soles of her feet. Urine microalbumin/ creatinine ratio negative today. 4. Low back pain. Patient reported difficulty with low back pain for the last 2 years after hiking. Reports exacerbated by caring for her elderly parents prior to their death. No improvement with conservative measures including yoga, massage, and chiropractor care. Lumbar x-ray with evidence of mild degenerative disc changes at L4/5 and L5/S1. Patient reported pain in back and buttocks. Had been using gabapentin without benefit, so instructed to discontinue last visit. Referred to physical therapy, and currently being treated with dry needling and finding it to be beneficial. Prescribed diclofenac 50 mg tid but not taking since did not find to be helpful. Has restarted gabapentin and finding some benefit. Referred to [place] and has an upcoming appointment next week with Dr. Adri Cisneros. Await his recommendations. 5. Osteopenia. Last bone density scan [id number]. Using femoral neck T-scores, calculated FRAX score estimates her 10 year risk of a major osteoporetic fracture at 9.4 % and hip fracture at 1.2 %, which are not an indication for biphosphonate treatment (>20% and >3%, respectively). Significantly decreased in her lumbar spine, approaching the level which would be considered osteoporosis and will need to follow. Continue calcium and Vitamin D. Encouraged exercise, particularly weight bearing activities. .  6. Microscopic colitis.  Diagnosed in 4/2018 during random colonic biopsies performed due to complaint of diarrhea. Weaned successfully off of budesonide treatment 1 year ago and has not had a recurrence since that time. Will continue to follow. 7. Peripheral neuropathy. Patient reports that it has been present for over 30 years. B12 and SPEP normal. HbA1c is elevated, but doubt that was responsible. May be related to prior alcohol abuse and small fiber neuropathy associated with excessive alcohol. She states that she does often have pain in her feet, but she has learned to cope with the discomfort and does not wish to receive medication. She also has a history of plantar fasciitis which may exacerbate the pain in her feet. Will continue to follow. 8. Smoking cessation. Discussed at length with the patient, including benefits of improved lung function as well as decreased risk of cardiovascular disease and cancer. Medication and non-pharmacologic options explored. Reviewed strategies to maximize success, including removing cigarettes from environment, addressing triggers, and stress management. She describes inability to tolerate Chantix and Wellbutrin due to bad dreams, but states that she was prescribed the SR formulation of Wellbutrin. Agreeable today to proceed with attempt at cessation with use of Wellbutrin IR. Discussed possible side effects and strategy to proceed. Will call with any difficulties. Total time spent on topic <10 min. 9. GERD. Currently without symptoms and no longer needing PPI. States that has learned to avoid foods that act as triggers. Follow. 10. Depression. She has a history of alcoholism and has attended AA for the last 31 years. She does use melatonin for insomnia, but does not have any active symptoms of depression. Will continue to follow. 11. Health maintenance. Will give influenza vaccine today. She received 2/2 doses of Shingrix vaccine.  Discussed Pneumovax 23 (indication: current smoker) and patient wishing to ask pharmacy if she received. Received Tdap. Other immunizations up to date. Mammogram up to date. Pap smear up to date. Colonoscopy due in 2023. Vitamin D level now normal. Continue maintenance dose supplement. Emphasized importance of lifestyle modifications, including diet, exercise, and weight loss. Smoking cessation as above. Does not meet criteria for low dose lung cancer screening due to 20 pack year smoking history. Patient understands recommendations and agrees with plan. Follow-up in 3 months.

## 2019-10-14 ENCOUNTER — TELEPHONE (OUTPATIENT)
Dept: INTERNAL MEDICINE CLINIC | Age: 64
End: 2019-10-14

## 2019-10-14 RX ORDER — BUPROPION HYDROCHLORIDE 100 MG/1
TABLET ORAL
Qty: 270 TAB | Refills: 1 | Status: SHIPPED | OUTPATIENT
Start: 2019-10-14 | End: 2020-02-01 | Stop reason: SDDI

## 2019-10-14 NOTE — TELEPHONE ENCOUNTER
Yes, it just said to stop smoking on day 8. Not to stop the medication. She should continue on 100 mg q8 hours.

## 2019-10-14 NOTE — TELEPHONE ENCOUNTER
Per pharmacy - Patient states she has had Wellbutrin ER tabs before and did not do well. She requested regular tabs. Please advise and pend new Rx if appropriate.     Notes from last OV:  Agreeable today to proceed with attempt at cessation with use of Wellbutrin IR

## 2019-10-14 NOTE — TELEPHONE ENCOUNTER
Call from [address] in regards to the Wellbutrin. Stating the directions don't match the quantity. Is she supposed to continue taking it after the 8 days?

## 2019-10-15 ENCOUNTER — HOSPITAL ENCOUNTER (OUTPATIENT)
Dept: PHYSICAL THERAPY | Age: 64
Discharge: HOME | End: 2019-10-15
Payer: COMMERCIAL

## 2019-10-15 PROCEDURE — 97110 THERAPEUTIC EXERCISES: CPT

## 2019-10-15 PROCEDURE — 97112 NEUROMUSCULAR REEDUCATION: CPT

## 2019-10-15 NOTE — TELEPHONE ENCOUNTER
Pharmacy wants to be sure this was intended for a 90 day supply. Says quantity doesn't match the directions. Leave on the machine if you cant reach them.

## 2019-10-15 NOTE — PROGRESS NOTES
PT DAILY TREATMENT NOTE 10-18    Patient Name: Mickey Bedoya  Date:10/15/2019  : 1955  [x]  Patient  Verified  Payor: Owen Rebolledo / Plan: [facility] / Product Type: Managed Care Medicaid /    In time:200  Out time:244  Total Treatment Time (min): 44  Visit #: 3 of 8    Medicare/BCBS Only   Total Timed Codes (min):   1:1 Treatment Time:         Treatment Area: Low back pain [M54.5]    SUBJECTIVE  Pain Level (0-10 scale): 4  Any medication changes, allergies to medications, adverse drug reactions, diagnosis change, or new procedure performed?: [x] No    [] Yes (see summary sheet for update)  Subjective functional status/changes:   [] No changes reported  There's a couple of spots that are bothering me on the left and the right. I've been painting all day. I was also able to garden this past weekend.     OBJECTIVE    Modality rationale: decrease pain to improve the patients ability to tolerate post needle soreness   Min Type Additional Details    [] Estim:  []Unatt       []IFC  []Premod                        []Other:  []w/ice   []w/heat  Position:  Location:    [] Estim: []Att    []TENS instruct  []NMES                    []Other:  []w/US   []w/ice   []w/heat  Position:  Location:    []  Traction: [] Cervical       []Lumbar                       [] Prone          []Supine                       []Intermittent   []Continuous Lbs:  [] before manual  [] after manual    []  Ultrasound: []Continuous   [] Pulsed                           []1MHz   []3MHz W/cm2:  Location:    []  Iontophoresis with dexamethasone         Location: [] Take home patch   [] In clinic   10 [x]  Ice     []  heat  []  Ice massage  []  Laser   []  Anodyne Position:prone  Location:B glutes    []  Laser with stim  []  Other:  Position:  Location:    []  Vasopneumatic Device Pressure:       [] lo [] med [] hi   Temperature: [] lo [] med [] hi   [] Skin assessment post-treatment:  []intact []redness- no adverse reaction    []redness  adverse reaction:       8 min Therapeutic Exercise:  [] See flow sheet :   Rationale: increase ROM and increase strength to improve the patients ability to perform daily activiites       26 min Neuromuscular Re-education:  []  See flow sheet :   Rationale: increase ROM and increase strength  to improve the patients ability to improve glute contractile sufficiency for daily activities  Dry Needling Procedure Note    Procedure: An intramuscular manual therapy (dry needling) and a neuro-muscular re-education treatment was done to deactivate myofascial trigger points with a 30 gauge filament needle under aseptic technique. Indications:  [x] Myofascial pain and dysfunction [] Muscled spasms  [x] Myalgia/myositis   [] Muscle cramps  [x] Muscle imbalances  [] Other:    Chart reviewed for the following:  Yandel MELVIN PT, have reviewed the medical history, summary list and precautions/contraindications for [employer].   TIME OUT performed immediately prior to start of procedure:  Yandel MELVIN PT, have performed the following reviews on [employer] prior to the start of the session:      [x] Verified patient identification by name and date of birth    [x] Agreement on all muscles being treated was verified   [x] Purpose of dry needling, side effects, possible complications, risks and benefits were explained to the patient   [x] Procedure site(s) verified  [x] Patient was positioned for comfort and draped for privacy  [x] Informed Consent was signed (initial visit) and verified verbally (subsequent visits)  [x] Patient was instructed on the need to report the use of blood thinners and/or immunosuppressant medications  [x] How to respond to possible adverse effects of treatment  [x] Self treatment of post needling soreness: ice, heat (moist heat, heat wraps) and stretching  [x] Opportunity was given to ask any questions, all questions were answered            Time: 200  Date of procedure: 10/15/2019    Treatment: The following muscles were treated today with intramuscular dry needling  [] Left [] Right Abdominals: Ant Rectus Abdominis  [] Left [] Right External Oblique / Internal Oblique / Transverse Abdominis  [] Left [] Right Thoracic Multifidi / Rotatores  [] Left [] Right Iliocostalis Thoracis / Cesar Oketo  [] Left [] Right Longissimus Thoracis / Cesar Sanjuana  [] Left [] Right Lumbar Multifidi  [] Left [] Right Serratus Posterior Inferior  [] Left [] Right Quadratus Lumborum  [] Left [] Right Psoas  [] Left [] Right Iliacus  [] Left [] Right Iliopsoas (inguinal)  [x] Left [x] Right Piriformis  [] Left [x] Right Quadratus Femoris  [x] Left [x] Right Floydene Avers / Christia Jared / Derek  [] Left [] Right Obturator Internus  [x] Left [x] Right Obturator Externus / Kayli Spring / Inferior Gemellus    [] Left [] Right Tensor Fasciae Renea  [] Left [] Right Iliotibial Band  [] Left [] Right Pectineus  [] Left [] Right Sartorius  [] Left [] Right Gracilis  [] Left [] Right Adductor Brevis / Adductor Longus / Adductor Demar  [] Left [] Right Rectus Femoris / Vastus Lateralis / Vastus Intermedius / Vastus Medialis Obliquus  [] Left [] Right Tibialis Anterior / Posterior  [] Left [] Right Extensor Digitorum Longus / Brevis  [] Left [] Right Extensor Hallucis Longus / Brevis  [] Left [] Right Hamstrings: Biceps Femoris / Constancia Hiwot / Semimembranosis  [] Left [] Right Popliteus / Planteris  [] Left [] Right Gastrocnemius Medial / Lateral  [] Left [] Right Soleus  [] Left [] Right Peronei: Longus / Edythe Sandra / Tertius  [] Left [] Right Flexor Digitorum Longus / Brevis  [] Left [] Right Flexor Hallucis Longus / Brevis  [] Left [] Right Quadratus Plantae  [] Left [] Right Abductor Digiti Minimi  [] Left [] Right Foot Interossei  [] Left [] Right Other:    Patient's response to today's treatment:  [x] Latent Twitch Response  [x] Muscle relaxation [x] Pain Relief  [x] Post needling soreness [x] without complications  [] Increased Range of Motion  [] Other:     Performed by: Jovita Rausch, PT            With   [] TE   [] TA   [] neuro   [] other: Patient Education: [x] Review HEP    [] Progressed/Changed HEP based on:   [] positioning   [] body mechanics   [] transfers   [] heat/ice application    [] other:      Other Objective/Functional Measures:      Pain Level (0-10 scale) post treatment: 4    ASSESSMENT/Changes in Function: ~50% less TtrPs noted in B glutes. Overall, patient is able to do more things with less pain, I.e. Painting and planting flowers. Patient will continue to benefit from skilled PT services to modify and progress therapeutic interventions, address functional mobility deficits, address ROM deficits, address strength deficits, analyze and address soft tissue restrictions, analyze and cue movement patterns and assess and modify postural abnormalities to attain remaining goals. []  See Plan of Care  []  See progress note/recertification  []  See Discharge Summary    Progress towards goals / Updated goals:  1. Improve FOTO MT 23 EK indicate improved function with daily activities. Status at PN/recert:  52  Current: 54  2. Decrease mm restrictions in B glutes by 50% to improve contractile sufficiency for daily activities. Status at PN/recert: 31%  Current: 39%  3. Report >50% improvement to enable patient to sit with minimal pain.   Status at PN/recert: 03%  Current: not assessed    PLAN  []  Upgrade activities as tolerated     []  Continue plan of care  []  Update interventions per flow sheet       []  Discharge due to:_  []  Other:_      CALI Villarreal, CMTPT 10/15/2019  10:01 AM    Future Appointments   Date Time Provider Nicky Gonzalezi   10/15/2019  2:00 PM [facility]   10/17/2019  9:00 AM Luz [place]  [address]   10/18/2019  8:30 AM [facility]   1/21/2020  9:00 AM [facility] NURSE VISIT [address] 1/28/2020 10:30 AM Rodrigo Huggins MD [facility]

## 2019-10-17 ENCOUNTER — OFFICE VISIT (OUTPATIENT)
Dept: ORTHOPEDIC SURGERY | Age: 64
End: 2019-10-17

## 2019-10-17 VITALS
WEIGHT: 123 LBS | OXYGEN SATURATION: 100 % | DIASTOLIC BLOOD PRESSURE: 80 MMHG | RESPIRATION RATE: 16 BRPM | SYSTOLIC BLOOD PRESSURE: 141 MMHG | HEART RATE: 76 BPM | BODY MASS INDEX: 24.15 KG/M2 | HEIGHT: 60 IN

## 2019-10-17 DIAGNOSIS — M62.89 PELVIC FLOOR DYSFUNCTION IN FEMALE: ICD-10-CM

## 2019-10-17 DIAGNOSIS — M79.18 MYOFASCIAL PAIN: ICD-10-CM

## 2019-10-17 DIAGNOSIS — M79.18 CERVICAL MYOFASCIAL PAIN SYNDROME: ICD-10-CM

## 2019-10-17 DIAGNOSIS — G57.03 PIRIFORMIS SYNDROME OF BOTH SIDES: ICD-10-CM

## 2019-10-17 DIAGNOSIS — M54.59 MECHANICAL LOW BACK PAIN: Primary | ICD-10-CM

## 2019-10-17 DIAGNOSIS — M53.3 SACROILIAC JOINT DYSFUNCTION OF RIGHT SIDE: ICD-10-CM

## 2019-10-17 DIAGNOSIS — M53.3 COCCYDYNIA: ICD-10-CM

## 2019-10-17 NOTE — PROGRESS NOTES
[address]  [address], [address]  [facility], [address]  Phone: [phone]  Fax: [phone]        Cherie Marker  : 1955  PCP: Vinod Velasco MD  10/17/2019    NEW PATIENT      HISTORY OF PRESENT ILLNESS  Darlyn Pettit is a 59 y.o. female c/o chronic low back and buttock pain x 3 years that was exacerbated after hiking. Her pain is worse with sitting. She has tried: yoga, chiropractic care, massage. She has attended PT (19-19 [facility]; 10/4/19-current; [facility]) more recently with dry needling. Pt denies radicular symptoms into her lower extremities, but has noticed a random, episodic pain in her LLE in no particular dermatomal distribution. She also c/o coccyx/sacral pain. Pt denies groin pain or urinary incontinence. Pt notes that she is able to ride her bicycle without pain, but her pain is exacerbated with walking. She rates her pain as a 5/10 today. ASSESSMENT   Her symptoms are likely due to cervical and lumbar myofascial pain with piriformis syndrome (L>R), right sacroiliac dysfunction, and coccydynia. This constellation of symptoms can sometimes occur in the context of pelvic floor dysfunction. This may also be related to lumbar radiculopathy, but she is neurologically intact. PLAN  1. Lumbar MRI to r/o underlying pathology. 2. Sacral MRI to r/o underlying pathology for sacral/coccyx pain. 3. Continue PT with dry needling ([facility]) and add pilates; we may consider a referral for a pelvic floor evaluation. 4. Referral to pain management for ganglion impar block. Pt will f/u after MRI or sooner if needed. Diagnoses and all orders for this visit:    1.  Mechanical low back pain  -     MRI LUMB SPINE WO CONT; Future  -     REFERRAL TO PHYSICAL THERAPY    2. Myofascial pain  -     MRI LUMB SPINE WO CONT; Future  -     REFERRAL TO PHYSICAL THERAPY    3. Cervical myofascial pain syndrome  - REFERRAL TO PHYSICAL THERAPY    4. Piriformis syndrome of both sides  -     MRI LUMB SPINE WO CONT; Future  -     REFERRAL TO PHYSICAL THERAPY    5. Coccydynia  -     MRI LUMB SPINE WO CONT; Future  -     MRI PELV WO CONT; Future  -     REFERRAL TO PHYSICAL THERAPY  -     REFERRAL TO PAIN MANAGEMENT    6. Pelvic floor dysfunction in female  -     REFERRAL TO PHYSICAL THERAPY    7. Sacroiliac joint dysfunction of right side  -     MRI LUMB SPINE WO CONT; Future  -     MRI PELV WO CONT; Future  -     REFERRAL TO PHYSICAL THERAPY       CHIEF COMPLAINT  Nadine Nichols is seen today in consultation at the request of Ondina Garcia MD for complaints of chronic low back pain. PAST MEDICAL HISTORY   Past Medical History:   Diagnosis Date    Depression     Essential hypertension     Generalized osteoarthrosis, involving multiple sites     GERD (gastroesophageal reflux disease)     Microscopic colitis 8/5/2019    Osteopenia of multiple sites 3/29/2019    Peripheral polyneuropathy 8/5/2019    Personal history of alcoholism ([facility] [address]     Prediabetes 04/10/2019       Past Surgical History:   Procedure Laterality Date    HX CHOLECYSTECTOMY      HX COLONOSCOPY  9/2010; 4/2018    (Paul Sosa) diverticuli; 2nd colo showed polyp/lymphocytic colitis    HX TONSIL AND ADENOIDECTOMY      HX TUBAL LIGATION Bilateral        MEDICATIONS    Current Outpatient Medications   Medication Sig Dispense Refill    buPROPion (WELLBUTRIN) 100 mg tablet Take 1 tab po q12 hours x 3 days, then increase to 1 tab po q8 hours. Stop smoking on day 8. 270 Tab 1    gabapentin (NEURONTIN) 300 mg capsule Take 300 mg by mouth three (3) times daily. Indications: one capsule at night      atorvastatin (LIPITOR) 10 mg tablet Take 1 Tab by mouth daily. 90 Tab 2    albuterol (PROVENTIL HFA, VENTOLIN HFA, PROAIR HFA) 90 mcg/actuation inhaler Take 1-2 Puffs by inhalation every four (4) hours as needed for Wheezing.  1 Inhaler 1    lisinopril-hydroCHLOROthiazide (PRINZIDE, ZESTORETIC) 20-12.5 mg per tablet Take 1 Tab by mouth daily. 90 Tab 3    calcium carbonate-vitamin D3 1,000 mg(2,500 mg)-800 unit tab Take 1 Tab by mouth daily. 90 Tab 3    melatonin 10 mg cap Take 10 mg by mouth. ALLERGIES  Allergies   Allergen Reactions    Celebrex [Celecoxib] Rash and Palpitations          SOCIAL HISTORY    Social History     Socioeconomic History    Marital status:      Spouse name: Not on file    Number of children: Not on file    Years of education: Not on file    Highest education level: Not on file   Tobacco Use    Smoking status: Current Every Day Smoker     Packs/day: 0.50     Years: 40.00     Pack years: 20.00    Smokeless tobacco: Former User     Quit date: 1/9/2016   Substance and Sexual Activity    Alcohol use: No     Alcohol/week: 0.0 standard drinks     Comment: quit 1988    Drug use: No    Sexual activity: Not Currently     Birth control/protection: Surgical     Comment: tubal ligation       FAMILY HISTORY  Family History   Problem Relation Age of Onset    Arthritis-osteo Mother     Ulcerative Colitis Mother     Breast Cancer Mother         breast, age70s    Heart Disease Father         chf    Hypertension Brother     Diabetes Brother     Elevated Lipids Brother     No Known Problems Brother     No Known Problems Brother          REVIEW OF SYSTEMS  Review of Systems   Musculoskeletal: Positive for back pain.         Bilateral buttock pain       PHYSICAL EXAMINATION  Visit Vitals  /80   Pulse 76   Resp 16   Ht 5' (1.524 m)   Wt 123 lb (55.8 kg)   SpO2 100%   BMI 24.02 kg/m²         Pain Assessment  10/17/2019   Location of Pain Back   Severity of Pain 5   Quality of Pain Throbbing;Aching   Duration of Pain Persistent   Frequency of Pain Constant   Aggravating Factors Bending;Walking   Limiting Behavior Some   Relieving Factors Other (Comment)   Relieving Factors Comment standing    Result of Injury No Constitutional:  Well developed, well nourished, in no acute distress. Psychiatric: Affect and mood are appropriate. HEENT: Normocephalic, atraumatic. Extraocular movements intact. Integumentary: No rashes or abrasions noted on exposed areas. Cardiovascular: Regular rate and rhythm. Pulmonary: Clear to auscultation bilaterally. SPINE/MUSCULOSKELETAL EXAM    Cervical spine:  Neck is midline. Normal muscle tone. No focal atrophy is noted. ROM pain free. Shoulder ROM intact. Tenderness to palpation of cervical paraspinals. Negative Spurling's sign. Negative Tinel's sign. Negative Delgado's sign. Sensation in the bilateral arms grossly intact to light touch. Lumbar spine:  No rash, ecchymosis, or gross obliquity. No fasciculations. No focal atrophy is noted. No pain with hip ROM. Full range of motion. Tenderness to palpation of lumbar paraspinals. No tenderness to palpation at the sciatic notch. SI joints tender on the right. Piriformis tender bilaterally (L>R). Trochanters non tender. Sensation in the bilateral legs grossly intact to light touch. ROBB on R provoked pain in an atypical distribution - more laterally      MOTOR:      Biceps  Triceps Deltoids Wrist Ext Wrist Flex Hand Intrin   Right 5/5 5/5 5/5 5/5 5/5 5/5   Left 5/5 5/5 5/5 5/5 5/5 5/5             Hip Flex  Quads Hamstrings Ankle DF EHL Ankle PF   Right 5/5 5/5 5/5 5/5 5/5 5/5   Left 5/5 5/5 5/5 5/5 5/5 5/5     DTRs are 2+ biceps, triceps, brachioradialis, patella, and Achilles. Negative Straight Leg raise. Squat not tested. No difficulty with tandem gait. Ambulation without assistive device. FWB. RADIOGRAPHS  Lumbar XR images taken on 3/26/19 personally reviewed with patient:  AP, lateral, and coned down lateral views of the lumbar spine were obtained. There are 5 nonrib-bearing lumbar appearing vertebral bodies which will be  designated L1-L5.  The alignment is normal. Osteopenia.     Vertebral heights are maintained. Mild disc space narrowing L4-L5 and L5-S1.      Aortic wall calcifications.     Surgical clips in the right upper quadrant. .     IMPRESSION  IMPRESSION:      Osteopenia. Mild disc space narrowing L4-L5 and L5-S1.  reviewed    Ms. Kristian Lyles has a reminder for a \"due or due soon\" health maintenance. I have asked that she contact her primary care provider for follow-up on this health maintenance. 31 minutes of face-to-face contact were spent with the patient during today's visit extensively discussing symptoms and treatment plan. All questions were answered. More than half of this visit today was spent on counseling. Written by Abdirizak Stewart, as dictated by Dr. Gabino Chavarria. I, Dr. Gabino Chavarria, confirm that all documentation is accurate.

## 2019-10-18 ENCOUNTER — HOSPITAL ENCOUNTER (OUTPATIENT)
Dept: PHYSICAL THERAPY | Age: 64
Discharge: HOME | End: 2019-10-18
Payer: COMMERCIAL

## 2019-10-18 PROCEDURE — 97140 MANUAL THERAPY 1/> REGIONS: CPT

## 2019-10-18 PROCEDURE — 97112 NEUROMUSCULAR REEDUCATION: CPT

## 2019-10-18 PROCEDURE — 97110 THERAPEUTIC EXERCISES: CPT

## 2019-10-18 NOTE — PROGRESS NOTES
PT DAILY TREATMENT NOTE     Patient Name: Hal Valles  Date:10/18/2019  : 1955  [x]  Patient  Verified  Payor: [facility] MEDICAID / Plan: [facility] [address] [facility] / Product Type: Managed Care Medicaid /    In time:8:31am  Out time:9:20am  Total Treatment Time (min): 52  Visit #: 4 of 8    Treatment Area: Low back pain [M54.5]    SUBJECTIVE  Pain Level (0-10 scale): 3/10  Any medication changes, allergies to medications, adverse drug reactions, diagnosis change, or new procedure performed?: [x] No    [] Yes (see summary sheet for update)  Subjective functional status/changes:   [] No changes reported  \"it's the same. \" Pt reports pain continues in her buttocks, lower back, and her coccyx. She reports it varies from left to right, more so on left today. OBJECTIVE  27 min Therapeutic Exercise:  [x] See flow sheet :   Rationale: increase ROM and increase strength to improve the patients ability to improve ease of daily activity. 10 min Manual Therapy:  T-L  grade 2-4, L/S STM, left gluteal & piriformis STM/TPR   Rationale: decrease pain, increase ROM, increase tissue extensibility and decrease trigger points to improve ease of daily activity. 12 min Neuromuscular Re-education:  [x]  See flow sheet :   Rationale: improve coordination and increase proprioception  to improve the patients ability to improve trunk stability during daily tasks.     PD modalities          With   [] TE   [] TA   [] neuro   [] other: Patient Education: [x] Review HEP    [] Progressed/Changed HEP based on:   [] positioning   [] body mechanics   [] transfers   [] heat/ice application    [] other:      Other Objective/Functional Measures: minimal TTP L/S paraspinals bilat, minimal localized discomfort with L4  without referral or reproduction of pt symptoms    TrP noted in left piriformis with reproduction of some of pt's buttock pain symptoms    No pain with addition of prone pressups with end range trunk extension    Pain Level (0-10 scale) post treatment: 0    ASSESSMENT/Changes in Function: Pt subjectively reports no change in symptoms as per subjective above, though she does report abolishment of pain post treatment today. She demonstrates some appreciable limitations in thoracolumbar segmental mobility, though no clear association with her symptoms and does demonstrate a trigger point in her left piriformis with some provocation of her symptoms noted. Continue myofascial interventions to left hip and gradual progression of strength/mobility. Patient will continue to benefit from skilled PT services to modify and progress therapeutic interventions, address functional mobility deficits, address ROM deficits, address strength deficits, analyze and address soft tissue restrictions, analyze and cue movement patterns and instruct in home and community integration to attain remaining goals. []  See Plan of Care  []  See progress note/recertification  []  See Discharge Summary         Progress towards goals / Updated goals:  1. Improve FOTO VN 62 VK indicate improved function with daily activities. Status at PN/recert:  52  Current: 54  2. Decrease mm restrictions in B glutes by 50% to improve contractile sufficiency for daily activities. Status at PN/recert: 22%  Current: 73%  3. Report >50% improvement to enable patient to sit with minimal pain.   Status at PN/recert: 56%  Current: not assessed    PLAN  []  Upgrade activities as tolerated     [x]  Continue plan of care  []  Update interventions per flow sheet       []  Discharge due to:_  []  Other:_      Leena Oates, PT, DPT, ATC 10/18/2019  8:37 AM    Future Appointments   Date Time Provider Nicky Kline   10/22/2019 11:00 AM Maxine Sequeira, PT [facility]   10/29/2019  2:30 PM [facility]   [facility]19[facility]2019  9[facility]   1/21/2020  9:00 AM [facility] NURSE VISIT [address] 1/28/2020 10:30 AM Jayna Huggins MD [facility]

## 2019-10-22 ENCOUNTER — APPOINTMENT (OUTPATIENT)
Dept: PHYSICAL THERAPY | Age: 64
End: 2019-10-22
Payer: COMMERCIAL

## 2019-10-22 DIAGNOSIS — M54.41 CHRONIC BILATERAL LOW BACK PAIN WITH BILATERAL SCIATICA: ICD-10-CM

## 2019-10-22 DIAGNOSIS — G89.29 CHRONIC BILATERAL LOW BACK PAIN WITH BILATERAL SCIATICA: ICD-10-CM

## 2019-10-22 DIAGNOSIS — M54.42 CHRONIC BILATERAL LOW BACK PAIN WITH BILATERAL SCIATICA: ICD-10-CM

## 2019-10-22 DIAGNOSIS — Z79.899 MEDICATION MANAGEMENT: Primary | ICD-10-CM

## 2019-10-29 ENCOUNTER — HOSPITAL ENCOUNTER (OUTPATIENT)
Dept: PHYSICAL THERAPY | Age: 64
Discharge: HOME | End: 2019-10-29
Payer: COMMERCIAL

## 2019-10-29 PROCEDURE — 97112 NEUROMUSCULAR REEDUCATION: CPT

## 2019-10-29 PROCEDURE — 97162 PT EVAL MOD COMPLEX 30 MIN: CPT

## 2019-10-29 NOTE — PROGRESS NOTES
PT DAILY TREATMENT NOTE 10-18    Patient Name: Caio Sep  Date:10/29/2019  : 1955  [x]  Patient  Verified  Payor: Karina Domingo [address] / Plan: [address] / Product Type: Managed Care Medicaid /    In time:241  Out time:325  Total Treatment Time (min): 44  Visit #: 1 of 8      Treatment Area: Low back pain [M54.5]  Myalgia, other site [M79.18]  Lesion of sciatic nerve, bilateral lower limbs [G57.03]    SUBJECTIVE  Pain Level (0-10 scale): 5  Any medication changes, allergies to medications, adverse drug reactions, diagnosis change, or new procedure performed?: [x] No    [] Yes (see summary sheet for update)  Subjective functional status/changes:   [] No changes reported  Refer to eval    OBJECTIVE    23 min Neuromuscular Re-education:  [x]  See flow sheet :   Rationale: increase strength, improve coordination, improve balance and increase proprioception  to improve the patients ability to increase ease of pain management             With   [] TE   [] TA   [] neuro   [] other: Patient Education: [x] Review HEP    [] Progressed/Changed HEP based on:   [] positioning   [] body mechanics   [] transfers   [] heat/ice application    [] other:      Other Objective/Functional Measures: refer to eval     Pain Level (0-10 scale) post treatment: 4    ASSESSMENT/Changes in Function: Significant hip mobility restrictions noted with reproduction of SIJ/coccygeal pain during treatment. Patient will continue to benefit from skilled PT services to modify and progress therapeutic interventions, address functional mobility deficits, address ROM deficits, address strength deficits, analyze and address soft tissue restrictions, analyze and cue movement patterns, analyze and modify body mechanics/ergonomics and assess and modify postural abnormalities to attain remaining goals.      []  See Plan of Care  []  See progress note/recertification  []  See Discharge Summary         Progress towards goals:  Short Term Goals: To be accomplished in 2 weeks:  1. Pt will be compliant with HEP2x/day to increase ease of ADLs  Eval: HEP established  2. Pt will increase (B) hip IR and ER to 35 degrees in prone to alleviate SIJ pain with sitting and standing  Eval: right was 10 degrees for both; Left was 25 degrees for both     Long Term Goals: To be accomplished in 4 weeks:  1. Pt will improve FOTO to 55 to increase ease of ADLs  Eval: 49  2. Pt will be able to operate a vacuum  without difficulty to increase ease of ADLs  Eval - limited a lot  3.   Pt will be able to walk a mile without difficulty to increase ease of ADLs  Eval - limited a lot    PLAN  []  Upgrade activities as tolerated     [x]  Continue plan of care  []  Update interventions per flow sheet       []  Discharge due to:_  []  Other:_      Lm Roman, PT 10/29/2019  2:35 PM    Future Appointments   Date Time Provider Nicky Gonzalezi   11/19/2019  9:00 AM Ivan Schaffer  [address]    1/21/2020  9:00 AM [facility] NURSE VISIT [facility]   1/28/2020 10:30 AM Nayely Key MD [facility]

## 2019-10-29 NOTE — PROGRESS NOTES
[facility]  [address]  [place], [address]  [phone] fax    Plan of Care/ Statement of Necessity for Physical Therapy Services    Patient name: Chikis Cai Start of Care: 10/29/2019   Referral source: Maude Crespo MD : 1955    Medical Diagnosis: Low back pain [M54.5]  Myalgia, other site [M79.18]  Lesion of sciatic nerve, bilateral lower limbs [G57.03]  Pay[facility]: [address] / Plan: [address] / Product Type: Managed Care Medicaid /  Onset Date:3 years ago    Treatment Diagnosis: back pain   Prior Hospitalization: see medical history Provider#: [id number]   Medications: Verified on Patient summary List    Comorbidities: HTN, back pain, depression   Prior Level of Function: Recreational hiker, yoga     The Plan of Care and following information is based on the information from the initial evaluation. Assessment/ key information: Pt is reevaluated for back pain not responding to chiropractic care, PT, or PT with dry needling. Pt is lacking segmental spine mobility in all planes and hinges with flexion entirely at hips/SIJ. Pt is showing significant (B) hip degenerative changes with (+) loss of ROM in rotation (B) and groin pain. Pt is lacking abdominal strength and control. Lastly. Pt does have a coccygeal abdnormality for which she may require additional treatment to address. Continue PT to address lumbopelvic balance to increase quality of life. Evaluation Complexity History MEDIUM  Complexity : 1-2 comorbidities / personal factors will impact the outcome/ POC ; Examination MEDIUM Complexity : 3 Standardized tests and measures addressing body structure, function, activity limitation and / or participation in recreation  ;Presentation MEDIUM Complexity : Evolving with changing characteristics  ; Clinical Decision Making MEDIUM Complexity : FOTO score of 26-74  Overall Complexity Rating: MEDIUM  Problem List: pain affecting function, decrease ROM, decrease strength, impaired gait/ balance, decrease ADL/ functional abilitiies, decrease activity tolerance, decrease flexibility/ joint mobility and decrease transfer abilities   Treatment Plan may include any combination of the following: Therapeutic exercise, Therapeutic activities, Neuromuscular re-education, Physical agent/modality, Gait/balance training, Manual therapy and Patient education  Patient / Family readiness to learn indicated by: asking questions, trying to perform skills and interest  Persons(s) to be included in education: patient (P)  Barriers to Learning/Limitations: None  Patient Goal (s): to eliminate the pain\"  Patient Self Reported Health Status: good  Rehabilitation Potential: good    Short Term Goals: To be accomplished in 2 weeks:  1. Pt will be compliant with HEP2x/day to increase ease of ADLs  Eval: HEP established  2. Pt will increase (B) hip IR and ER to 35 degrees in prone to alleviate SIJ pain with sitting and standing  Eval: right was 10 degrees for both; Left was 25 degrees for both    Long Term Goals: To be accomplished in 4 weeks:  1. Pt will improve FOTO to 55 to increase ease of ADLs  Eval: 49  2. Pt will be able to operate a vacuum  without difficulty to increase ease of ADLs  Eval - limited a lot  3. Pt will be able to walk a mile without difficulty to increase ease of ADLs  Eval - limited a lot  Frequency / Duration: Patient to be seen 2 times per week for 4 weeks. Patient/ Caregiver education and instruction: Diagnosis, prognosis, self care, activity modification and exercises   [x]  Plan of care has been reviewed with JEREMY Gil, PT 10/29/2019 2:33 PM    ________________________________________________________________________    I certify that the above Therapy Services are being furnished while the patient is under my care.  I agree with the treatment plan and certify that this therapy is necessary.     Physician's Signature:____________Date:_________TIME:________    ** Signature, Date and Time must be completed for valid certification **    Please sign and return to [facility] [address]  [address]  [place], [address]  [phone] fax

## 2019-10-31 NOTE — TELEPHONE ENCOUNTER
Unsure of current dose as there are conflicting sig's listed. Please clarify sig and sign if appropriate. [facility]ontin as [facility]19/19 for a 30 d/s.      Last Visit: 10/8/19 with MD Uli Monae  Next Appointment: 1/28/20 with MD Huggins  Previous Refill Encounter(s): 3/26/19 #60 with 3 refills    Requested Prescriptions     Pending Prescriptions Disp Refills    gabapentin (NEURONTIN) 300 mg capsule [Pharmacy Med Name: GABAPENTIN 300MG    CAP] 60 Cap 0     Sig: TAKE 2 CAPSULES BY MOUTH AT BEDTIME AS NEEDED FOR PAIN

## 2019-10-31 NOTE — PROGRESS NOTES
[facility]  [address]  [place], [address]  [phone] fax    Physical Therapy Discharge Summary  Patient name: Silver Gregorio Start of Care: 19   Referral source: Orin Arceo MD : 1955   Medical/Treatment Diagnosis: Low back pain [M54.5]  Payor: [address] [place] [address] Road / Plan: 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 Visits from Start of Care: 17    Missed Visits: 0  Reporting Period : 10/4/19 to 10/18/19      Summary of Care:  1. Improve FOTO OP 27 RA indicate improved function with daily activities.   Status at PN/recert:  49  Current: 54  2. Decrease mm restrictions in B glutes by 50% to improve contractile sufficiency for daily activities. Status at PN/recert: 21%  Current: 88%  3. Report >50% improvement to enable patient to sit with minimal pain. Status at PN/recert: 00%  Current: not assessed  Patient is currently being treated in PT under a different physician's care.     ASSESSMENT/RECOMMENDATIONS:  [x]Discontinue therapy: []Patient has reached or is progressing toward set goals      []Patient is non-compliant or has abdicated      []Due to lack of appreciable progress towards set Ul. Abilio Cunningham, PT 10/31/2019 12:39 PM

## 2019-11-01 ENCOUNTER — APPOINTMENT (OUTPATIENT)
Dept: INTERNAL MEDICINE CLINIC | Age: 64
End: 2019-11-01

## 2019-11-01 DIAGNOSIS — Z79.899 MEDICATION MANAGEMENT: ICD-10-CM

## 2019-11-01 NOTE — TELEPHONE ENCOUNTER
Called and spoke with patient she stated she is taking Gabapentin 1 tab prn at bedtime. Patient stated she had just spoke with [facility] and stated she would be in to the office today to leave the UDS and complete the controlled substance agreement. UDS ordered.

## 2019-11-01 NOTE — TELEPHONE ENCOUNTER
Please clarify with patient if she is taking one or two capsules at bedtime. Also, Please confirm updated UDS and controlled substance agreement status.

## 2019-11-01 NOTE — TELEPHONE ENCOUNTER
Patient called in and requested that her Gabapentin be filled because she accidentally had it sent to [facility]. Patient is coming in today to give a urine specimen for the urine drug screen and fill out the controlled substance agreement form. Last refill - 8/19/2019  Last office visit - 10/8/2019  Next office visit - 1/28/2020  No inconsistencies noted in the [place] .

## 2019-11-03 LAB
6 ACETYLMORPHINE: NEGATIVE NG/ML
6 ACETYLMORPHINE: NORMAL
ABNORMAL SPECIMEN VALIDITY TEST:: NORMAL
ALCOHOL METABOLITES: NEGATIVE NG/ML
ALPHAHYDROXYALPRAZOLAM, URINE: NORMAL
ALPHAHYDROXYMIDAZOLAM, URINE: NORMAL
ALPHAHYDROXYTRIAZOLAM, URINE: NORMAL
AMINOCLONAZEPAM, URINE: NORMAL
AMOBARBITAL, 017178: NORMAL
AMPHETAMINE, 737686: NORMAL
AMPHETAMINES: NEGATIVE NG/ML
BARBITURATES: NEGATIVE NG/ML
BENZODIAZEPINES, 711093: NEGATIVE NG/ML
BENZOYLECGONINE, 767636: NORMAL
BUTALBITAL, 072181: NORMAL
COCAINE METABOLITE, 13422: NEGATIVE NG/ML
CODEINE, 737481: NORMAL
CREATININE UR, 7048: 27.1 MG/DL
EDDP, EDDP: NORMAL
HYDROCODONE, 713572: NORMAL
HYDROMORPHONE, 071525: NORMAL
HYDROXYETHYLFLURAZEPAM, URINE: NORMAL
LORAZEPAM, 737721: NORMAL
Lab: NORMAL
MARIJUANA METABOLITE, 794827: NORMAL
MARIJUANA METABOLITE: NEGATIVE NG/ML
MEDMATCH AMINOCLONAZEPAM: NORMAL
MEDMATCH AMPHETAMINES: NORMAL
MEDMATCH AOH ALPRAZOLAM: NORMAL
MEDMATCH BENZOYLECGONINE: NORMAL
MEDMATCH CODEINE: NORMAL
MEDMATCH HYDROCODONE: NORMAL
MEDMATCH HYDROMORPHONE: NORMAL
MEDMATCH MARIJUANA METAB: NORMAL
MEDMATCH MARIJUANA METAB: NORMAL
MEDMATCH METHADONE METAB: NORMAL
MEDMATCH METHADONE METAB: NORMAL
MEDMATCH MORPHINE: NORMAL
MEDMATCH NORHYDROCODONE: NORMAL
MEDMATCH NOROXYCODONE: NORMAL
MEDMATCH OXYCODONE: NORMAL
MEDMATCH OXYCODONE: NORMAL
MEDMATCH OXYMORPHONE: NORMAL
MEDMATCH PHENCYCLIDINE: NORMAL
MEDMATCH PHENCYCLIDINE: NORMAL
METHADONE METABOLITE: NEGATIVE NG/ML
METHADONE, 071969: NORMAL
METHAMPHETAMINE, 737688: NORMAL
MORPHINE, 071528: NORMAL
NORDIAZEPAM, 716050: NORMAL
NORHYDROCODONE: NORMAL
NOROXYCODONE: NORMAL
OPIATES: NEGATIVE NG/ML
OXAZEPAM, 737716: NORMAL
OXIDANT: NEGATIVE MCG/ML
OXYCODONE, 071536: NEGATIVE NG/ML
OXYCODONE, 737462: NORMAL
OXYMORPHONE, 071537: NORMAL
PENTOBARBITAL, 017194: NORMAL
PH UR STRIP: 6.3 [PH] (ref 4.5–9)
PHENCYCLIDINE URINE: NEGATIVE NG/ML
PHENCYCLIDINE URINE: NORMAL
PHENOBARBITAL, 017210: NORMAL
PRESCRIBED DRUG 1: NORMAL
PRESCRIBED DRUG 2: NORMAL
PRESCRIBED DRUG 3: NORMAL
PRESCRIBED DRUG 4: NORMAL
PRESCRIBED DRUG 5: NORMAL
SECOBARBITAL, 017202: NORMAL
SPECIFIC GRAVITY, 761003: NORMAL
TEMAZEPAM, 713781: NORMAL

## 2019-11-04 ENCOUNTER — TELEPHONE (OUTPATIENT)
Dept: ORTHOPEDIC SURGERY | Age: 64
End: 2019-11-04

## 2019-11-04 DIAGNOSIS — M79.18 CERVICAL MYOFASCIAL PAIN SYNDROME: ICD-10-CM

## 2019-11-04 DIAGNOSIS — M53.3 COCCYDYNIA: ICD-10-CM

## 2019-11-04 DIAGNOSIS — M62.89 PELVIC FLOOR DYSFUNCTION IN FEMALE: ICD-10-CM

## 2019-11-04 DIAGNOSIS — M53.3 SACROILIAC JOINT DYSFUNCTION OF RIGHT SIDE: ICD-10-CM

## 2019-11-04 DIAGNOSIS — M54.59 MECHANICAL LOW BACK PAIN: Primary | ICD-10-CM

## 2019-11-04 DIAGNOSIS — G57.03 PIRIFORMIS SYNDROME OF BOTH SIDES: ICD-10-CM

## 2019-11-04 DIAGNOSIS — M79.18 MYOFASCIAL PAIN: ICD-10-CM

## 2019-11-04 RX ORDER — GABAPENTIN 300 MG/1
300 CAPSULE ORAL
Qty: 90 CAP | Refills: 0 | Status: SHIPPED | OUTPATIENT
Start: 2019-11-04 | End: 2020-02-03

## 2019-11-04 RX ORDER — GABAPENTIN 300 MG/1
300 CAPSULE ORAL
Qty: 90 CAP | Refills: 0 | OUTPATIENT
Start: 2019-11-04

## 2019-11-04 NOTE — TELEPHONE ENCOUNTER
Melani from [facility] is requesting we update p/t order from 10/17 to say \"evaluate and treat\" in the comments instead of continue to treat. They have had to re-evaluate the patient. She will print from [address] [place] [address] when corrected.

## 2019-11-09 ENCOUNTER — HOSPITAL ENCOUNTER (OUTPATIENT)
Age: 64
Discharge: HOME | End: 2019-11-09
Attending: PHYSICAL MEDICINE & REHABILITATION
Payer: COMMERCIAL

## 2019-11-09 DIAGNOSIS — M53.3 SACROILIAC JOINT DYSFUNCTION OF RIGHT SIDE: ICD-10-CM

## 2019-11-09 DIAGNOSIS — M54.59 MECHANICAL LOW BACK PAIN: ICD-10-CM

## 2019-11-09 DIAGNOSIS — M53.3 COCCYDYNIA: ICD-10-CM

## 2019-11-09 DIAGNOSIS — M79.18 MYOFASCIAL PAIN: ICD-10-CM

## 2019-11-09 DIAGNOSIS — G57.03 PIRIFORMIS SYNDROME OF BOTH SIDES: ICD-10-CM

## 2019-11-09 PROCEDURE — 72148 MRI LUMBAR SPINE W/O DYE: CPT

## 2019-11-09 PROCEDURE — 72195 MRI PELVIS W/O DYE: CPT

## 2019-11-14 ENCOUNTER — HOSPITAL ENCOUNTER (OUTPATIENT)
Dept: PHYSICAL THERAPY | Age: 64
Discharge: HOME | End: 2019-11-14
Payer: COMMERCIAL

## 2019-11-14 PROCEDURE — 97112 NEUROMUSCULAR REEDUCATION: CPT

## 2019-11-14 PROCEDURE — 97140 MANUAL THERAPY 1/> REGIONS: CPT

## 2019-11-14 NOTE — PROGRESS NOTES
PT DAILY TREATMENT NOTE 10-18    Patient Name: Brenda Gómez  Date:2019  : 1955  [x]  Patient  Verified  Payor: [address] / Plan: [address] / Product Type: Managed Care Medicaid /    In time:600  Out time:640  Total Treatment Time (min): 40  Visit #: 2 of 8      Treatment Area: Low back pain [M54.5]    SUBJECTIVE  Pain Level (0-10 scale): 4  Any medication changes, allergies to medications, adverse drug reactions, diagnosis change, or new procedure performed?: [x] No    [] Yes (see summary sheet for update)  Subjective functional status/changes:   [] No changes reported  I feel like I've had a set back since the evaluation because I haven't been in in so long. Pt reports that she remains extremely active outside of therapy but has been compliant with HEP    OBJECTIVE    15 min Manual Therapy:  90/90 hip mobs in all planes on trap table; mobilization with movement, Grade 3 inferior/posterior glides to (B) hips, grade 3 thoracic PA mobs   Rationale: decrease pain, increase ROM and increase tissue extensibility to alleviate tailbone and (B) SIJ pain  25 min Neuromuscular Re-education:  []  See flow sheet :   Rationale: increase strength, improve coordination and increase proprioception  to improve the patients ability to improve isolation of hip rotation, disassociate lumbar and pelvic AROM             With   [] TE   [] TA   [] neuro   [] other: Patient Education: [x] Review HEP    [] Progressed/Changed HEP based on:   [] positioning   [] body mechanics   [] transfers   [] heat/ice application    [] other:      Other Objective/Functional Measures: Pt had difficulty isolating active hip IR on core align with rotator disc. Note that after she was able to complete the movement that she felt a \"good\" stretching sensation along (B) SIJ, the ischial tuberosities, and the coccyx.   Unfortunately the rotational drills also aggravated anterior/lateral muscles of the right hip     Pain Level (0-10 scale) post treatment: 3    ASSESSMENT/Changes in Function: Decreased pain post treatment with lumbar motion and pressure along the coccyx    Patient will continue to benefit from skilled PT services to modify and progress therapeutic interventions, address functional mobility deficits, address ROM deficits, address strength deficits, analyze and address soft tissue restrictions, analyze and cue movement patterns, analyze and modify body mechanics/ergonomics and assess and modify postural abnormalities to attain remaining goals. []  See Plan of Care  []  See progress note/recertification  []  See Discharge Summary         Progress towards goals / Updated goals:  Short Term Goals: To be accomplished in 2 weeks:  1.  Pt will be compliant with HEP2x/day to increase ease of ADLs  Eval: HEP established  2.  Pt will increase (B) hip IR and ER to 35 degrees in prone to alleviate SIJ pain with sitting and standing  Eval: right was 10 degrees for both; Left was 25 degrees for both     Long Term Goals: To be accomplished in 4 weeks:  1.  Pt will improve FOTO to 55 to increase ease of ADLs  Eval: 49  2.  Pt will be able to operate a vacuum  without difficulty to increase ease of ADLs  Eval - limited a lot  3.  Pt will be able to walk a mile without difficulty to increase ease of ADLs  Eval - limited a lot    PLAN  []  Upgrade activities as tolerated     [x]  Continue plan of care  []  Update interventions per flow sheet       []  Discharge due to:_  []  Other:_      Lesley Richardson, PT 11/14/2019  6:41 PM    Future Appointments   Date Time Provider [facility]   11/15/2019 11:30 AM Tarun, [address] [facility]   11/19/2019  9:00 AM Cristal Quiroz  [address]    11/19/2019  4:00 PM [facility]   11/22/2019  8:30 AM [facility]anks[facility]   11/26/2019  4:00 PM [facility]   12/2/2019  9:00 AM [facility] Wagner [facility]   12/3/2019  4:00 PM [facility]   1/21/2020  9:00 AM I[facility] NURSE VISIT [facility] YOLANDA BROWN   1/28/2020 10:30 AM Julien Huggins MD [facility]

## 2019-11-15 ENCOUNTER — HOSPITAL ENCOUNTER (OUTPATIENT)
Dept: PHYSICAL THERAPY | Age: 64
Discharge: HOME | End: 2019-11-15
Payer: COMMERCIAL

## 2019-11-15 PROCEDURE — 97140 MANUAL THERAPY 1/> REGIONS: CPT

## 2019-11-15 PROCEDURE — 97112 NEUROMUSCULAR REEDUCATION: CPT

## 2019-11-15 NOTE — PROGRESS NOTES
PT DAILY TREATMENT NOTE 10-18    Patient Name: Ira Burrell  Date:11/15/2019  : 1955  [x]  Patient  Verified  Payor: [address] / Plan: [address] / Product Type: Managed Care Medicaid /    In time:11:36  Out time:12:09  Total Treatment Time (min): 33  Visit #: 3 of 8      Treatment Area: Low back pain [M54.5]    SUBJECTIVE  Pain Level (0-10 scale): 4  Any medication changes, allergies to medications, adverse drug reactions, diagnosis change, or new procedure performed?: [x] No    [] Yes (see summary sheet for update)  Subjective functional status/changes:   [] No changes reported  Pt reports no adverse response from yesterday's session, states she is not as sore as she expected    OBJECTIVE    Modality rationale: PD to improve the patients ability to    Min Type Additional Details    [] Estim:  []Unatt       []IFC  []Premod                        []Other:  []w/ice   []w/heat  Position:  Location:    [] Estim: []Att    []TENS instruct  []NMES                    []Other:  []w/US   []w/ice   []w/heat  Position:  Location:    []  Traction: [] Cervical       []Lumbar                       [] Prone          []Supine                       []Intermittent   []Continuous Lbs:  [] before manual  [] after manual    []  Ultrasound: []Continuous   [] Pulsed                           []1MHz   []3MHz W/cm2:  Location:    []  Iontophoresis with dexamethasone         Location: [] Take home patch   [] In clinic    []  Ice     []  heat  []  Ice massage  []  Laser   []  Anodyne Position:  Location:    []  Laser with stim  []  Other:  Position:  Location:    []  Vasopneumatic Device Pressure:       [] lo [] med [] hi   Temperature: [] lo [] med [] hi   [] Skin assessment post-treatment:  []intact []redness- no adverse reaction    []redness  adverse reaction:     10 min Neuromuscular Re-education:  []  See flow sheet :   Rationale: improve coordination and increase proprioception  to improve the patients ability to perform daily tasks with improved lumbosacral mechanics and mobility    23 min Manual Therapy:  Left sacral PA's grade II in prone, IMDN palpation, setup and hemostasis including DN consent form and post needling care   Rationale: increase ROM, increase tissue extensibility and decrease trigger points to improve ease of lumbar mobility and reduced paraspinal recruitment    Dry Needling Procedure Note    Procedure: An intramuscular manual therapy (dry needling) and a neuro-muscular re-education treatment was done to deactivate myofascial trigger points with a 30 gauge filament needle under aseptic technique. Indications:  [x] Myofascial pain and dysfunction [] Muscled spasms  [] Myalgia/myositis   [] Muscle cramps  [x] Muscle imbalances  [] Other:    Chart reviewed for the following:  Trish MELVIN, have reviewed the medical history, summary list and precautions/contraindications for [employer].   TIME OUT performed immediately prior to start of procedure:  Trish MELVIN, have performed the following reviews on [employer] prior to the start of the session:      [x] Verified patient identification by name and date of birth    [x] Agreement on all muscles being treated was verified   [x] Purpose of dry needling, side effects, possible complications, risks and benefits were explained to the patient   [x] Procedure site(s) verified  [x] Patient was positioned for comfort and draped for privacy  [x] Informed Consent was signed (initial visit) and verified verbally (subsequent visits)  [x] Patient was instructed on the need to report the use of blood thinners and/or immunosuppressant medications  [x] How to respond to possible adverse effects of treatment  [x] Self treatment of post needling soreness: ice, heat (moist heat, heat wraps) and stretching  [x] Opportunity was given to ask any questions, all questions were answered            Time: 11:41  Date of procedure: 11/15/2019    Treatment: The following muscles were treated today with intramuscular dry needling  [] Left [] Right Abdominals: Ant Rectus Abdominis  [] Left [] Right External Oblique / Internal Oblique / Transverse Abdominis  [] Left [] Right Thoracic Multifidi / Rotatores  [] Left [] Right Iliocostalis Thoracis / Lumborum  [x] Left [x] Right Longissimus Thoracis / Lumborum  [x] Left [x] Right Lumbar Multifidi  [] Left [] Right Serratus Posterior Inferior  [] Left [] Right Quadratus Lumborum  [] Left [] Right Psoas  [] Left [] Right Iliacus  [] Left [] Right Iliopsoas (inguinal)  [] Left [] Right Piriformis  [] Left [] Right Quadratus Femoris  [x] Left [x] Right Fredrick Amsler / Bridgette Hummingbird / Derek  [] Left [] Right Obturator Internus  [] Left [] Right Obturator Externus / Lutricia Hipps / Inferior Gemellus    [] Left [] Right Tensor Fasciae Renea  [] Left [] Right Iliotibial Band  [] Left [] Right Pectineus  [] Left [] Right Sartorius  [] Left [] Right Gracilis  [] Left [] Right Adductor Brevis / Adductor Longus / Adductor Demar  [] Left [] Right Rectus Femoris / Vastus Lateralis / Vastus Intermedius / Vastus Medialis Obliquus  [] Left [] Right Tibialis Anterior / Posterior  [] Left [] Right Extensor Digitorum Longus / Brevis  [] Left [] Right Extensor Hallucis Longus / Brevis  [] Left [] Right Hamstrings: Biceps Femoris / Saddie Mu / Semimembranosis  [] Left [] Right Popliteus / Planteris  [] Left [] Right Gastrocnemius Medial / Lateral  [] Left [] Right Soleus  [] Left [] Right Peronei: Longus / Carmen Nazia / Tertius  [] Left [] Right Flexor Digitorum Longus / Brevis  [] Left [] Right Flexor Hallucis Longus / Brevis  [] Left [] Right Quadratus Plantae  [] Left [] Right Abductor Digiti Minimi  [] Left [] Right Foot Interossei  [] Left [] Right Other:    Patient's response to today's treatment:  [x] Latent Twitch Response  [] Muscle relaxation [] Pain Relief  [] Post needling soreness [x] without complications  [] Increased Range of Motion  [] Other:     Performed by: Karin Vargas DPT, CMTPT          With   [] TE   [] TA   [] neuro   [] other: Patient Education: [x] Review HEP    [] Progressed/Changed HEP based on:   [] positioning   [] body mechanics   [] transfers   [] heat/ice application    [] other:      Other Objective/Functional Measures: added prone barrel DK today to improve sacral rotation and allow lumbar stretch     Pain Level (0-10 scale) post treatment: 2    ASSESSMENT/Changes in Function: Pt with good tolerance to DN, reports improved mobility and less pain immediately post session. Advised to monitor symptoms over the weekend and hold painting today    Patient will continue to benefit from skilled PT services to modify and progress therapeutic interventions, address functional mobility deficits, address ROM deficits, address strength deficits, analyze and address soft tissue restrictions, analyze and cue movement patterns and analyze and modify body mechanics/ergonomics to attain remaining goals. -  See Plan of Care  []  See progress note/recertification  []  See Discharge Summary         Progress towards goals / Updated goals:  Short Term Goals: To be accomplished in 2 weeks:  1.  Pt will be compliant with HEP2x/day to increase ease of ADLs  Eval: HEP established   Current: pt reports HEP compliance to date 11/15/19  2.  Pt will increase (B) hip IR and ER to 35 degrees in prone to alleviate SIJ pain with sitting and standing  Eval: right was 10 degrees for both; Left was 25 degrees for both     Long Term Goals: To be accomplished in 4 weeks:  1.  Pt will improve FOTO to 55 to increase ease of ADLs  Eval: 49  2.  Pt will be able to operate a vacuum  without difficulty to increase ease of ADLs  Eval - limited a lot  3.  Pt will be able to walk a mile without difficulty to increase ease of ADLs  Eval - limited a lot    PLAN  []  Upgrade activities as tolerated     [x]  Continue plan of care  []  Update interventions per flow sheet       []  Discharge due to:_  []  Other:_      Lion Chaney DPT, CMTPT 11/15/2019  12:08 PM    Future Appointments   Date Time Provider Nicky Kline   11/19/2019  9:00 AM Jorge Kang  [address]   11/19/2019  4:00 PM Nicol Smith, PT [facility]   11/22/2019  8:30 AM Lidya Chowdary, PT [facility]   11/26/2019  4:00 PM Nicol Smith, PT [facility]   12/2/2019  9:00 AM Cecil Mcmanus [facility]   12/3/2019  4:00 PM Nicol Smith, PT [facility]   1/21/2020  9:00 AM [facility] NURSE VISIT [facility] [address]   1/28/2020 10:30 AM Eloisa Huggins MD [facility] [address]

## 2019-11-19 ENCOUNTER — OFFICE VISIT (OUTPATIENT)
Dept: ORTHOPEDIC SURGERY | Age: 64
End: 2019-11-19

## 2019-11-19 ENCOUNTER — HOSPITAL ENCOUNTER (OUTPATIENT)
Dept: PHYSICAL THERAPY | Age: 64
Discharge: HOME | End: 2019-11-19
Payer: COMMERCIAL

## 2019-11-19 ENCOUNTER — TELEPHONE (OUTPATIENT)
Dept: INTERNAL MEDICINE CLINIC | Age: 64
End: 2019-11-19

## 2019-11-19 VITALS
HEIGHT: 60 IN | RESPIRATION RATE: 18 BRPM | BODY MASS INDEX: 24.02 KG/M2 | HEART RATE: 73 BPM | SYSTOLIC BLOOD PRESSURE: 125 MMHG | TEMPERATURE: 98.2 F | DIASTOLIC BLOOD PRESSURE: 69 MMHG

## 2019-11-19 DIAGNOSIS — M53.3 COCCYDYNIA: ICD-10-CM

## 2019-11-19 DIAGNOSIS — G57.03 PIRIFORMIS SYNDROME OF BOTH SIDES: ICD-10-CM

## 2019-11-19 DIAGNOSIS — M79.18 CERVICAL MYOFASCIAL PAIN SYNDROME: ICD-10-CM

## 2019-11-19 DIAGNOSIS — M79.18 MYOFASCIAL PAIN: ICD-10-CM

## 2019-11-19 DIAGNOSIS — M54.59 MECHANICAL LOW BACK PAIN: Primary | ICD-10-CM

## 2019-11-19 DIAGNOSIS — M62.89 PELVIC FLOOR DYSFUNCTION IN FEMALE: ICD-10-CM

## 2019-11-19 DIAGNOSIS — N83.202 CYST OF LEFT OVARY: Primary | ICD-10-CM

## 2019-11-19 DIAGNOSIS — M53.3 SACROILIAC JOINT DYSFUNCTION OF RIGHT SIDE: ICD-10-CM

## 2019-11-19 DIAGNOSIS — R93.5 ABNORMAL MRI, PELVIS: ICD-10-CM

## 2019-11-19 PROCEDURE — 97112 NEUROMUSCULAR REEDUCATION: CPT

## 2019-11-19 PROCEDURE — 97140 MANUAL THERAPY 1/> REGIONS: CPT

## 2019-11-19 NOTE — TELEPHONE ENCOUNTER
Please let the patient know that I have reviewed the pelvic MRI and recommend that she have a pelvic ultrasound to evaluate the ovarian lesion further. Order has been placed and please provide number for her to schedule. Once this  performed, will make further is recommendations on who is the best specialist for her to see. Also, please find out who she has seen previously for gyn care. Thanks.

## 2019-11-19 NOTE — PROGRESS NOTES
PT DAILY TREATMENT NOTE 10-18    Patient Name: Isabelle Farrell  Date:2019  : 1955  [x]  Patient  Verified  [facility] [address] / Plan: [address] / Product Type: Managed Care Medicaid /    In time:400  Out time:441  Total Treatment Time (min): 41  Visit #: 4 of 8      Treatment Area: Low back pain [M54.5]    SUBJECTIVE  Pain Level (0-10 scale): 4  Any medication changes, allergies to medications, adverse drug reactions, diagnosis change, or new procedure performed?: [x] No    [] Yes (see summary sheet for update)  Subjective functional status/changes:   [] No changes reported  I feel best in the morning and right after treatment but it doesn't last.  Pt will be getting SIJ injections    OBJECTIVE    15 min Manual Therapy:  90/90 hip mobs in all planes on trap table; mobilization with movement, Grade 3 inferior/posterior glides to (B) hips, grade 3 thoracic PA mobs   Rationale: decrease pain, increase ROM and increase tissue extensibility to improve hip an dlumbar pain  26 min Neuromuscular Re-education:  [x]  See flow sheet :   Rationale: increase ROM, increase strength and improve coordination  to improve the patients ability to increase ease of self management of pain '        With   [] TE   [] TA   [] neuro   [] other: Patient Education: [x] Review HEP    [] Progressed/Changed HEP based on:   [] positioning   [] body mechanics   [] transfers   [] heat/ice application    [] other:      Other Objective/Functional Measures: Progress noted in hip ROM in all planes and less pain across groin and SIJ. Persistent lumbar tightness and difficulty reversing lordosis     Pain Level (0-10 scale) post treatment: 1    ASSESSMENT/Changes in Function: Pt feels great after treatment.   Continue working on release of lumbar erector spinae    Patient will continue to benefit from skilled PT services to modify and progress therapeutic interventions, address functional mobility deficits, address ROM deficits, address strength deficits, analyze and address soft tissue restrictions, analyze and cue movement patterns, analyze and modify body mechanics/ergonomics and assess and modify postural abnormalities to attain remaining goals. []  See Plan of Care  []  See progress note/recertification  []  See Discharge Summary         Progress towards goals / Updated goals:  Short Term Goals: To be accomplished in 2 weeks:  1.  Pt will be compliant with HEP2x/day to increase ease of ADLs  Eval: HEP established   Current: pt reports HEP compliance to date 11/15/19  2.  Pt will increase (B) hip IR and ER to 35 degrees in prone to alleviate SIJ pain with sitting and standing  Eval: right was 10 degrees for both; Left was 25 degrees for both     Long Term Goals: To be accomplished in 4 weeks:  1.  Pt will improve FOTO to 55 to increase ease of ADLs  Eval: 49  2.  Pt will be able to operate a vacuum  without difficulty to increase ease of ADLs  Eval - limited a lot  3.  Pt will be able to walk a mile without difficulty to increase ease of ADLs  Eval - limited a lot    PLAN  []  Upgrade activities as tolerated     [x]  Continue plan of care  []  Update interventions per flow sheet       []  Discharge due to:_  []  Other:_      Emma Harper, PT 11/19/2019  4:03 PM    Future Appointments   Date Time Provider Nicky Kline   11/26/2019  4:00 PM [facility]   12/2/2019  9:00 AM Tarun Duncan[address] [facility] [facility] [address] [facility]   12/3/2019  4:00 PM [facility]   1/21/2020  9:00 AM [facility] NURSE VISIT [address]   1/28/2020 10:30 AM Gloria Mojica MD [facility]

## 2019-11-19 NOTE — TELEPHONE ENCOUNTER
Called patient and verified full name and date of birth. Informed patient of Dr. Lee Monday' message and gave her the phone number to [facility] Scheduling to set the appointment and she verbalized understanding. Patient states she was seen by Dr. Rocky Chowdary nurse practitioner at her last visit 2 years ago.

## 2019-11-19 NOTE — TELEPHONE ENCOUNTER
Pt calling says Dr. Rg Foreman told her there is a growth on her left ovary.  Patient is asking if Dr. Yarely Castro can take over her gyn care or if she needs to see a specialist?

## 2019-11-19 NOTE — PROGRESS NOTES
[address], [address]  [place], [facility] [address]  Phone: [phone]  Fax: [phone]        Vand[place]  : 1955  PCP: Gwendolyn Andrews MD  2019    PROGRESS NOTE      HISTORY OF PRESENT ILLNESS  Isabelle Farrell is a 59 y.o. female who was seen as a new patient 10/17/19 with c/o chronic low back and buttock pain x 3 years that was exacerbated after hiking. Her pain is worse with sitting. She has tried: yoga, chiropractic care, massage. She has attended PT (19-19 [facility]; 10/4/19-current; [facility]) more recently with dry needling. Pt denies radicular symptoms into her lower extremities, but has noticed a random, episodic pain in her LLE in no particular dermatomal distribution. She also c/o coccyx/sacral pain. Pt denies groin pain or urinary incontinence. Pt notes that she is able to ride her bicycle without pain, but her pain is exacerbated with walking. Isabelle Farrell comes in to the office today for f/u. She remains in PT (10/29-current; [facility]), but she has not found significant progress. She finds immediate benefit from dry needling, but her pain returns about an hour after she leaves PT. Lumbar MRI 19: Mild degenerative spinal disease but without significant spinal canal or foraminal stenosis. Pelvic/Sacral MRI 19: Minimal nonspecific edema in the left sided sacrum, at the SI joint with no joint effusion, erosion or ankylosis. No acute acral or coccygeal fracture. Mild bilateral hamstring origin tendinosis. Large septated cystic mass in left adnexal area, likely ovarian cyst and can be further evaluated with pelvic ultrasound. She is scheduled to have the ganglion impar block next week. Pt notes that when she wakes up in the morning, her pain is a 0/10, but it builds up throughout the day, and by the time she is ready for bed, it is up to an 8.  She rates her pain as a 4/10 today.    ASSESSMENT  Her symptoms are likely due to cervical and lumbar myofascial pain with piriformis syndrome (L>R), right sacroiliac dysfunction, and coccydynia. This constellation of symptoms can sometimes occur in the context of pelvic floor dysfunction or sacral posterior ligaments. There is no obvious structural pathology on the lumbar or pelvic MRIs to explain her symptoms. PLAN  1. Proceed with ganglion impar block for her coccydynia (Dr. Сергей Donis). 2. Continue with PT.  3. Advised she f/u with OBGYN for potential left ovarian cyst visualized on MRI. Pt will f/u in 2 weeks after injection or sooner as needed. Diagnoses and all orders for this visit:    1. Mechanical low back pain    2. Myofascial pain    3. Cervical myofascial pain syndrome    4. Piriformis syndrome of both sides    5. Coccydynia    6. Pelvic floor dysfunction in female    7. Sacroiliac joint dysfunction of right side         PAST MEDICAL HISTORY   Past Medical History:   Diagnosis Date    Depression     Essential hypertension     Generalized osteoarthrosis, involving multiple sites     GERD (gastroesophageal reflux disease)     Microscopic colitis 8/5/2019    Osteopenia of multiple sites 3/29/2019    Peripheral polyneuropathy 8/5/2019    Personal history of alcoholism ([facility] [address]     Prediabetes 04/10/2019       Past Surgical History:   Procedure Laterality Date    HX CHOLECYSTECTOMY      HX COLONOSCOPY  9/2010; 4/2018    (Kellen Piedra) diverticuli; 2nd colo showed polyp/lymphocytic colitis    HX TONSIL AND ADENOIDECTOMY      HX TUBAL LIGATION Bilateral    .      MEDICATIONS    Current Outpatient Medications   Medication Sig Dispense Refill    gabapentin (NEURONTIN) 300 mg capsule Take 1 Cap by mouth nightly. Max Daily Amount: 300 mg. 90 Cap 0    buPROPion (WELLBUTRIN) 100 mg tablet Take 1 tab po q12 hours x 3 days, then increase to 1 tab po q8 hours.  Stop smoking on day 8. 270 Tab 1    atorvastatin (LIPITOR) 10 mg tablet Take 1 Tab by mouth daily. 90 Tab 2    albuterol (PROVENTIL HFA, VENTOLIN HFA, PROAIR HFA) 90 mcg/actuation inhaler Take 1-2 Puffs by inhalation every four (4) hours as needed for Wheezing. 1 Inhaler 1    lisinopril-hydroCHLOROthiazide (PRINZIDE, ZESTORETIC) 20-12.5 mg per tablet Take 1 Tab by mouth daily. 90 Tab 3    calcium carbonate-vitamin D3 1,000 mg(2,500 mg)-800 unit tab Take 1 Tab by mouth daily. 90 Tab 3    melatonin 10 mg cap Take 10 mg by mouth. ALLERGIES  Allergies   Allergen Reactions    Celebrex [Celecoxib] Rash and Palpitations          SOCIAL HISTORY    Social History     Socioeconomic History    Marital status:      Spouse name: Not on file    Number of children: Not on file    Years of education: Not on file    Highest education level: Not on file   Tobacco Use    Smoking status: Current Every Day Smoker     Packs/day: 0.50     Years: 40.00     Pack years: 20.00    Smokeless tobacco: Former User     Quit date: 1/9/2016   Substance and Sexual Activity    Alcohol use: No     Alcohol/week: 0.0 standard drinks     Comment: quit 1988    Drug use: No    Sexual activity: Not Currently     Birth control/protection: Surgical     Comment: tubal ligation       FAMILY HISTORY  Family History   Problem Relation Age of Onset    Arthritis-osteo Mother     Ulcerative Colitis Mother     Breast Cancer Mother         breast, age70s    Heart Disease Father         chf    Hypertension Brother     Diabetes Brother     Elevated Lipids Brother     No Known Problems Brother     No Known Problems Brother          REVIEW OF SYSTEMS  Review of Systems   Musculoskeletal: Positive for back pain. Bilateral buttock pain           PHYSICAL EXAMINATION  Visit Vitals  /69   Pulse 73   Temp 98.2 °F (36.8 °C) (Oral)   Resp 18   Ht 5' (1.524 m)   BMI 24.02 kg/m²       Pain Assessment  11/19/2019   Location of Pain Back   Severity of Pain 4   Quality of Pain Dull; Jorge Ledesma Duration of Pain Persistent   Frequency of Pain Constant   Aggravating Factors -   Limiting Behavior -   Relieving Factors -   Relieving Factors Comment -   Result of Injury -     Constitutional:  Well developed, well nourished, in no acute distress. Psychiatric: Affect and mood are appropriate. Integumentary: No rashes or abrasions noted on exposed areas. SPINE/MUSCULOSKELETAL EXAM    Cervical spine:  Neck is midline. Normal muscle tone. No focal atrophy is noted. ROM pain free. Shoulder ROM intact. Tenderness to palpation of cervical paraspinals. Negative Spurling's sign. Negative Tinel's sign. Negative Delgado's sign. Sensation in the bilateral arms grossly intact to light touch.      Lumbar spine:  No rash, ecchymosis, or gross obliquity. No fasciculations. No focal atrophy is noted. No pain with hip ROM. Full range of motion. Tenderness to palpation of lumbar paraspinals. No tenderness to palpation at the sciatic notch. SI joints tender on the right. Piriformis tender bilaterally (L>R). Trochanters non tender. Sensation in the bilateral legs grossly intact to light touch.     ROBB on R provoked pain in an atypical distribution - more laterally    MOTOR:      Biceps  Triceps Deltoids Wrist Ext Wrist Flex Hand Intrin   Right 5/5 5/5 5/5 5/5 5/5 5/5   Left 5/5 5/5 5/5 5/5 5/5 5/5             Hip Flex  Quads Hamstrings Ankle DF EHL Ankle PF   Right 5/5 5/5 5/5 5/5 5/5 5/5   Left 5/5 5/5 5/5 5/5 5/5 5/5     DTRs are 2+ biceps, triceps, brachioradialis, patella, and Achilles.     Negative Straight Leg raise. Squat not tested. No difficulty with tandem gait.      Ambulation without assistive device.  FWB.       RADIOGRAPHS  Pelvic MRI images taken on 11/9/19 personally reviewed with patient:  Dedicated coronal oblique images through the sacrum and sacroiliac joints are  obtained in addition to routine sequences.     Sacroiliac joints are intact. No erosion. No joint effusion. Minimal nonspecific  edema along the sacral side of the left sacroiliac joint with no fracture line. Mild angulation at the tip of the coccyx, with no edema or surrounding edema.     Pelvis is intact. Hip joints unremarkable. Minimal hamstring origin tendinosis  bilaterally.     There is a large septated cyst in the left adnexal area, likely ovarian cyst, it  measures at least 6.1 x 3.8 x 4 cm. No surrounding inflammation. Sigmoid  diverticulosis with no active diverticulitis.     IMPRESSION  IMPRESSION:  1. Minimal nonspecific edema in the left sided sacrum, at the SI joint with no  joint effusion, erosion or ankylosis. No acute acral or coccygeal fracture. 2. Mild bilateral hamstring origin tendinosis. 3. Large septated cystic mass in left adnexal area, likely ovarian cyst and can  be further evaluated with pelvic ultrasound    Lumbar MRI images taken on 11/9/19 personally reviewed with patient:  Normal alignment and vertebral body heights. No suspicious marrow  replacing lesions. The conus medullaris ends at the L1 vertebral level with  normal signal and caliber. Cauda equina nerve roots are unremarkable. Paraspinal  soft tissues are unremarkable.     Spinal canal and neural foramen:      T12-L1: No significant degenerative disc disease. No spinal canal or foraminal  stenosis.     L1-2: No significant degenerative disc disease. No spinal canal or foraminal  stenosis.     L2-3: No significant degenerative disc disease. No spinal canal or foraminal  stenosis.     L3-4: Anteriorly bulging disc otherwise no significant degenerative disc  disease. No spinal canal or foraminal stenosis.     L4-5: Minimally bulging disc. Mild facet arthropathy. No spinal canal or  foraminal stenosis.     L5-S1: Disc is mildly narrowed and bulging. Mild facet arthropathy.  No spinal  canal or foraminal stenosis.     IMPRESSION  IMPRESSION:     Mild degenerative spinal disease but without significant spinal canal or  foraminal stenosis. Lumbar XR images taken on 3/26/19 personally reviewed with patient:  AP, lateral, and coned down lateral views of the lumbar spine were obtained. There are 5 nonrib-bearing lumbar appearing vertebral bodies which will be  designated L1-L5. The alignment is normal. Osteopenia.     Vertebral heights are maintained. Mild disc space narrowing L4-L5 and L5-S1.      Aortic wall calcifications.     Surgical clips in the right upper quadrant. .     IMPRESSION  IMPRESSION:      Osteopenia. Mild disc space narrowing L4-L5 and L5-S1.    25 minutes of face-to-face contact were spent with the patient during today's visit extensively discussing symptoms and treatment plan. All questions were answered. More than half of this visit today was spent on counseling.      Written by Di Conrad as dictated by Dread Valadez MD

## 2019-11-21 ENCOUNTER — APPOINTMENT (OUTPATIENT)
Dept: PHYSICAL THERAPY | Age: 64
End: 2019-11-21
Payer: COMMERCIAL

## 2019-11-22 ENCOUNTER — APPOINTMENT (OUTPATIENT)
Dept: PHYSICAL THERAPY | Age: 64
End: 2019-11-22
Payer: COMMERCIAL

## 2019-11-26 ENCOUNTER — APPOINTMENT (OUTPATIENT)
Dept: PHYSICAL THERAPY | Age: 64
End: 2019-11-26
Payer: COMMERCIAL

## 2019-12-02 ENCOUNTER — HOSPITAL ENCOUNTER (OUTPATIENT)
Dept: PHYSICAL THERAPY | Age: 64
End: 2019-12-02

## 2019-12-02 ENCOUNTER — HOSPITAL ENCOUNTER (OUTPATIENT)
Dept: ULTRASOUND IMAGING | Age: 64
Discharge: HOME | End: 2019-12-02
Attending: INTERNAL MEDICINE
Payer: MEDICAID

## 2019-12-02 ENCOUNTER — TELEPHONE (OUTPATIENT)
Dept: INTERNAL MEDICINE CLINIC | Age: 64
End: 2019-12-02

## 2019-12-02 DIAGNOSIS — R93.5 ABNORMAL MRI, PELVIS: ICD-10-CM

## 2019-12-02 DIAGNOSIS — N83.202 CYST OF LEFT OVARY: ICD-10-CM

## 2019-12-02 DIAGNOSIS — R93.89 ENDOMETRIAL THICKENING ON ULTRASOUND: Primary | ICD-10-CM

## 2019-12-02 DIAGNOSIS — N83.202 LEFT OVARIAN CYST: ICD-10-CM

## 2019-12-02 DIAGNOSIS — D25.9 UTERINE LEIOMYOMA, UNSPECIFIED LOCATION: ICD-10-CM

## 2019-12-02 PROCEDURE — 76830 TRANSVAGINAL US NON-OB: CPT

## 2019-12-03 ENCOUNTER — APPOINTMENT (OUTPATIENT)
Dept: PHYSICAL THERAPY | Age: 64
End: 2019-12-03

## 2019-12-03 NOTE — TELEPHONE ENCOUNTER
US Results (most recent):  Results from [place] encounter on 12/02/19   US PELV NON OB W TV    Narrative EXAM:  US PELV NON OB W TV    INDICATION:   Large septated cystic mass in left adnexal area on pelvic MRI. Please evaluate. COMPARISON: Pelvic MRI dated 11/9/2019    FINDINGS:     The left adnexal septated cyst seen at MR persists currently measuring 6.3 x 3.0  x 4.3 cm and presumably arises from the left ovary which is otherwise not  visualized and believed to have been replaced by the cyst. Other than the thin  septation, no solid components are appreciated. No abnormal vascular  characteristics. Normal right ovary measures 2.0 x 0.8 x 0.9 cm. No adnexal masses on the right. Heterogeneous myometrium with a left posterior upper uterine body myoma  measuring 2.5 x 1.6 x 1.8 cm. Heterogeneous, thickened (13 mm) endometrial  lining with multicystic degeneration raises the possibility of adenomyomatosis. No retained fluid in the endometrial cavity. No free fluid in the pelvis. Impression IMPRESSION:    Large septated cyst likely replaces the left ovary. Abnormal endometrium with features of adenomyomatosis. Tissue sampling via  hysteroscopy may be a consideration. Uterine myoma. Please let the patient know that her pelvic ultrasound showed that her left ovary has been replaced by a large septated cyst. There are no concerning findings within the cyst that would suggest malignancy. There was also evidence of endometrial thickening lining the uterus as well as a uterine fibroid. Would recommend that she proceed with referral to gynecology to review the findings of the ultrasound. May require sampling of the uterine lining. Referral placed for Dr. Ranulfo Simms. Please provide number to schedule.

## 2019-12-03 NOTE — TELEPHONE ENCOUNTER
Called patient and verified full name and date of birth. Informed patient of ultrasound results and gave number to GYN Dr. Author Sultana. Patient states she has been having 3 days of vaginal problems and went to get Monistat over the counter and it made her start to burn. I informed the patient that I will give Dr. Lee Monday this information and she will let us know what she wants for the patient to do. Patient verbalized understanding.

## 2019-12-04 NOTE — TELEPHONE ENCOUNTER
Please clarify what symptoms she is having. Is she having a vaginal discharge or itching? She has scheduled with gynecology on 12/19/2019. Will she be able to wait for her appointment to have this evaluated?

## 2019-12-04 NOTE — TELEPHONE ENCOUNTER
Called patient and verified full name and date of birth. Informed her of Dr. Mariama Nina' message and patient verbalized understanding.

## 2019-12-04 NOTE — TELEPHONE ENCOUNTER
Called patient and verified full name and date of birth. Patient states she is having clear discharge, burning at urination and and odor. No itching. Onset 2 months. Patient states that it isn't getting worse or better. She states used vaginal Monistat, 2 weeks ago, and only used one day. She states that she was having burning with her labia.

## 2019-12-06 ENCOUNTER — TELEPHONE (OUTPATIENT)
Dept: ORTHOPEDIC SURGERY | Age: 64
End: 2019-12-06

## 2019-12-06 NOTE — TELEPHONE ENCOUNTER
Patient called and stated that she was referred to Dr. Mervat Klein office and she states that the office informed her that they are non par with her insurance would like to be advised on what to do.    Patient tel : [phone]

## 2019-12-09 NOTE — TELEPHONE ENCOUNTER
Spoke with patient and she has changed her insurance to Mauri and it will go into effect in January. She will wait until then for her injection.

## 2019-12-17 NOTE — PROGRESS NOTES
[facility] [address]  [place], [address]  [phone] fax    Physical Therapy Discharge Summary  Patient name: Brenda Newell Start of Care: 10/29/2019   Referral source: Janak Banerjee MD : 1955                Medical Diagnosis: Low back pain [M54.5]  Myalgia, other site [M79.18]  Lesion of sciatic nerve, bilateral lower limbs [G57.03]  Payor: [address] [place], [place] [address] / Plan: [address] / Product Type: Managed Care Medicaid /  Onset Date:3 years ago                Treatment Diagnosis: back pain   Prior Hospitalization: see medical history Provider#: [id number]   Medications: Verified on Patient summary List    Comorbidities: HTN, back pain, depression   Prior Level of Function: Recreational hiker, yoga                   Visits from Start of Care: 4    Missed Visits: 4  Reporting Period : 10/29/2019 to 2019      Summary of Care:  Unable to further assess progress towards goals at this time due to non-compliance/lack of attendance. DC at this time with no further instructions to the patient.   Thank you for this referral.        ASSESSMENT/RECOMMENDATIONS:  [x]Discontinue therapy: []Patient has reached or is progressing toward set goals      [x]Patient is non-compliant or has abdicated      []Due to lack of appreciable progress towards set goals    Thierry Barth, PT 2019 1:31 PM

## 2019-12-19 ENCOUNTER — HOSPITAL ENCOUNTER (OUTPATIENT)
Dept: LAB | Age: 64
Discharge: HOME | End: 2019-12-19
Payer: MEDICAID

## 2019-12-19 ENCOUNTER — OFFICE VISIT (OUTPATIENT)
Dept: OBGYN CLINIC | Age: 64
End: 2019-12-19

## 2019-12-19 VITALS
HEART RATE: 74 BPM | WEIGHT: 122 LBS | TEMPERATURE: 98.5 F | DIASTOLIC BLOOD PRESSURE: 67 MMHG | HEIGHT: 60 IN | SYSTOLIC BLOOD PRESSURE: 127 MMHG | RESPIRATION RATE: 18 BRPM | BODY MASS INDEX: 23.95 KG/M2

## 2019-12-19 DIAGNOSIS — N89.8 VAGINAL IRRITATION: ICD-10-CM

## 2019-12-19 DIAGNOSIS — N83.209 CYST OF OVARY, UNSPECIFIED LATERALITY: ICD-10-CM

## 2019-12-19 DIAGNOSIS — N76.0 ACUTE VAGINITIS: ICD-10-CM

## 2019-12-19 DIAGNOSIS — N83.209 CYST OF OVARY, UNSPECIFIED LATERALITY: Primary | ICD-10-CM

## 2019-12-19 LAB — WET MOUNT POCT, WMPOCT: NORMAL

## 2019-12-19 PROCEDURE — 86304 IMMUNOASSAY TUMOR CA 125: CPT

## 2019-12-19 RX ORDER — NYSTATIN 100000 U/G
CREAM TOPICAL 2 TIMES DAILY
Qty: 15 G | Refills: 0 | Status: SHIPPED | OUTPATIENT
Start: 2019-12-19

## 2019-12-19 NOTE — PROGRESS NOTES
Subjective:      Dieter Peña is a 59 y.o. female who presents as a referral from her PCP. She reports having obtained imaging for lower back pain and was found to have abnormalities noted within her reproductive organs. Her imaging results are in the chart. She was then referred to our office for further evaluation after additional abnormalities noted on pelvic U/S. She has been menopausal since she was 48 and has not complained of vaginal bleeding since that time. She does report external vaginal irritation that burned when she applied monistat cream to it for treatment.      Patient Active Problem List   Diagnosis Code    Personal history of alcoholism ([facility] [address] F10.21    Generalized osteoarthrosis, involving multiple sites M15.9    GERD (gastroesophageal reflux disease) K21.9    Depression F32.9    Hyperlipidemia E78.5    Essential hypertension I10    Chronic peripheral neuropathic pain M79.2, G89.29    Osteopenia of multiple sites M85.89    Prediabetes R73.03    Chronic bilateral low back pain with bilateral sciatica M54.42, M54.41, G89.29    Vitamin D deficiency E55.9    Peripheral polyneuropathy G62.9    Current every day smoker F17.200    Microscopic colitis K52.839     Patient Active Problem List    Diagnosis Date Noted    Chronic bilateral low back pain with bilateral sciatica 08/05/2019    Vitamin D deficiency 08/05/2019    Peripheral polyneuropathy 08/05/2019    Current every day smoker 08/05/2019    Microscopic colitis 08/05/2019    Prediabetes 04/10/2019    Osteopenia of multiple sites 03/29/2019    Chronic peripheral neuropathic pain 03/26/2019    Personal history of alcoholism (HCC)     Generalized osteoarthrosis, involving multiple sites     GERD (gastroesophageal reflux disease)     Depression     Hyperlipidemia     Essential hypertension      Current Outpatient Medications   Medication Sig Dispense Refill    nystatin (MYCOSTATIN) topical cream Apply  to affected area two (2) times a day. 15 g 0    gabapentin (NEURONTIN) 300 mg capsule Take 1 Cap by mouth nightly. Max Daily Amount: 300 mg. 90 Cap 0    buPROPion (WELLBUTRIN) 100 mg tablet Take 1 tab po q12 hours x 3 days, then increase to 1 tab po q8 hours. Stop smoking on day 8. 270 Tab 1    atorvastatin (LIPITOR) 10 mg tablet Take 1 Tab by mouth daily. 90 Tab 2    albuterol (PROVENTIL HFA, VENTOLIN HFA, PROAIR HFA) 90 mcg/actuation inhaler Take 1-2 Puffs by inhalation every four (4) hours as needed for Wheezing. 1 Inhaler 1    lisinopril-hydroCHLOROthiazide (PRINZIDE, ZESTORETIC) 20-12.5 mg per tablet Take 1 Tab by mouth daily. 90 Tab 3    calcium carbonate-vitamin D3 1,000 mg(2,500 mg)-800 unit tab Take 1 Tab by mouth daily. 90 Tab 3    melatonin 10 mg cap Take 10 mg by mouth.        Allergies   Allergen Reactions    Celebrex [Celecoxib] Rash and Palpitations     Past Medical History:   Diagnosis Date    Depression     Essential hypertension     Generalized osteoarthrosis, involving multiple sites     GERD (gastroesophageal reflux disease)     Microscopic colitis 8/5/2019    Osteopenia of multiple sites 3/29/2019    Peripheral polyneuropathy 8/5/2019    Personal history of alcoholism ([address]     Prediabetes 04/10/2019     Past Surgical History:   Procedure Laterality Date    HX CHOLECYSTECTOMY      HX COLONOSCOPY  9/2010; 4/2018    (Susy Leys) diverticuli; 2nd colo showed polyp/lymphocytic colitis    HX TONSIL AND ADENOIDECTOMY      HX TUBAL LIGATION Bilateral      Family History   Problem Relation Age of Onset    Arthritis-osteo Mother     Ulcerative Colitis Mother     Breast Cancer Mother         breast, age70s    Heart Disease Father         chf    Hypertension Brother     Diabetes Brother     Elevated Lipids Brother     No Known Problems Brother     No Known Problems Brother      Social History     Tobacco Use    Smoking status: Current Every Day Smoker     Packs/day: 0.50     Years: 40.00     Pack years: 20.00    Smokeless tobacco: Former User     Quit date: 1/9/2016   Substance Use Topics    Alcohol use: No     Alcohol/week: 0.0 standard drinks     Comment: quit 1988      Review of Systems    A comprehensive review of systems was negative except for that written in the HPI. Objective:     Visit Vitals  /67   Pulse 74   Temp 98.5 °F (36.9 °C) (Oral)   Resp 18   Ht 5' (1.524 m)   Wt 122 lb (55.3 kg)   BMI 23.83 kg/m²      Physical Exam:   General appearance - alert, well appearing, and in no distress  Pelvic - normal external genitalia, vulva, vagina, cervix, uterus and adnexa   KOH/Wet prep: no pathology noted    Assessment/Plan:   Discussed concerns regarding the ovarian cyst and need for repeat imaging to assess for resolution. Also advised of the need for  to ensure minimal chance for malignancy. She denies vaginal bleeding. We discussed the ultrasound findings of possible adenomyosis which obscures the endometrial lining measurements. It is difficult to ascertain whether or not her endometrial stripe is thickened due to the adenomyosis. Because Ms. Chandra does not complain of postmenopausal bleeding and this was an incidental finding, we discussed conservative management with repeat imaging in 8 weeks for further assessment. Ms. Lyudmila Galvan declines invasive management with a biopsy at this time, but understands that we can discuss more invasive means if desired. The entire visit with Ms. Chandra took 30 minutes. Over 50% of this visit was spent counseling the patient regarding her concerns. All questions were answered to her satisfaction. She will follow-up in 2 months. ICD-10-CM ICD-9-CM    1. Cyst of ovary, unspecified laterality N83.209 620.2 CANCER ANTIGEN 125      US PELV NON OB W TV   2. Vaginal irritation N89.8 623.9 nystatin (MYCOSTATIN) topical cream   3.  Acute vaginitis N76.0 616.10 AMB POC SMEAR, STAIN & INTERPRET, WET MOUNT     Encounter Diagnoses   Name Primary?  Cyst of ovary, unspecified laterality Yes    Vaginal irritation     Acute vaginitis      Orders Placed This Encounter    US PELV NON OB W TV    CANCER ANTIGEN 125    AMB POC SMEAR, STAIN & INTERPRET, WET MOUNT    nystatin (MYCOSTATIN) topical cream     Follow-up and Dispositions    · Return in about 2 months (around 2/19/2020).

## 2019-12-20 LAB — CANCER AG125 SERPL-ACNC: 14 U/ML (ref 1.5–35)

## 2020-01-17 PROBLEM — M53.3 SACROCOCCYGEAL DISORDERS, NOT ELSEWHERE CLASSIFIED: Status: ACTIVE | Noted: 2020-01-17

## 2020-01-22 ENCOUNTER — LAB ONLY (OUTPATIENT)
Dept: INTERNAL MEDICINE CLINIC | Age: 65
End: 2020-01-22

## 2020-01-22 ENCOUNTER — TELEPHONE (OUTPATIENT)
Dept: INTERNAL MEDICINE CLINIC | Age: 65
End: 2020-01-22

## 2020-01-22 DIAGNOSIS — E78.5 HYPERLIPIDEMIA, UNSPECIFIED HYPERLIPIDEMIA TYPE: ICD-10-CM

## 2020-01-22 DIAGNOSIS — M85.89 OSTEOPENIA OF MULTIPLE SITES: ICD-10-CM

## 2020-01-22 DIAGNOSIS — I10 ESSENTIAL HYPERTENSION: Primary | ICD-10-CM

## 2020-01-22 DIAGNOSIS — R73.03 PREDIABETES: ICD-10-CM

## 2020-01-22 DIAGNOSIS — G62.9 PERIPHERAL POLYNEUROPATHY: ICD-10-CM

## 2020-01-22 LAB
AVG GLU, 10930: 130 MG/DL (ref 91–123)
HBA1C MFR BLD HPLC: 6.2 % (ref 4.8–5.6)

## 2020-01-22 NOTE — TELEPHONE ENCOUNTER
Jack Bui from [facility] lab said they received a microalbumin on this pt but it was in the wrong specimen  cup

## 2020-01-22 NOTE — TELEPHONE ENCOUNTER
Called patient and verified full name and date of birth. Informed her of the message below and she states she only used what she was given and will come in and give another specimen today.

## 2020-01-23 LAB
CREATININE, URINE: 98 MG/DL
MICROALB/CREAT RATIO, 140286: NORMAL
MICROALBUMIN,URINE RANDOM 140054: <12 MG/L (ref 0.1–17)

## 2020-01-28 ENCOUNTER — OFFICE VISIT (OUTPATIENT)
Dept: INTERNAL MEDICINE CLINIC | Age: 65
End: 2020-01-28

## 2020-01-28 VITALS
TEMPERATURE: 98.6 F | HEART RATE: 68 BPM | SYSTOLIC BLOOD PRESSURE: 122 MMHG | RESPIRATION RATE: 16 BRPM | OXYGEN SATURATION: 96 % | BODY MASS INDEX: 24.74 KG/M2 | DIASTOLIC BLOOD PRESSURE: 64 MMHG | WEIGHT: 126 LBS | HEIGHT: 60 IN

## 2020-01-28 DIAGNOSIS — Z00.00 LABORATORY TESTS ORDERED AS PART OF A COMPLETE PHYSICAL EXAM (CPE): ICD-10-CM

## 2020-01-28 DIAGNOSIS — M54.42 CHRONIC BILATERAL LOW BACK PAIN WITH BILATERAL SCIATICA: ICD-10-CM

## 2020-01-28 DIAGNOSIS — M85.89 OSTEOPENIA OF MULTIPLE SITES: ICD-10-CM

## 2020-01-28 DIAGNOSIS — E55.9 VITAMIN D DEFICIENCY: ICD-10-CM

## 2020-01-28 DIAGNOSIS — M53.3 SACROCOCCYGEAL DISORDERS, NOT ELSEWHERE CLASSIFIED: ICD-10-CM

## 2020-01-28 DIAGNOSIS — M79.2 CHRONIC PERIPHERAL NEUROPATHIC PAIN: ICD-10-CM

## 2020-01-28 DIAGNOSIS — F17.200 CURRENT EVERY DAY SMOKER: ICD-10-CM

## 2020-01-28 DIAGNOSIS — K21.9 GASTROESOPHAGEAL REFLUX DISEASE, ESOPHAGITIS PRESENCE NOT SPECIFIED: ICD-10-CM

## 2020-01-28 DIAGNOSIS — G89.29 CHRONIC PERIPHERAL NEUROPATHIC PAIN: ICD-10-CM

## 2020-01-28 DIAGNOSIS — K52.832 LYMPHOCYTIC COLITIS: ICD-10-CM

## 2020-01-28 DIAGNOSIS — G62.9 PERIPHERAL POLYNEUROPATHY: ICD-10-CM

## 2020-01-28 DIAGNOSIS — M79.18 MYOFASCIAL PAIN SYNDROME OF LUMBAR SPINE: ICD-10-CM

## 2020-01-28 DIAGNOSIS — F33.42 RECURRENT MAJOR DEPRESSIVE DISORDER, IN FULL REMISSION (HCC): ICD-10-CM

## 2020-01-28 DIAGNOSIS — R73.03 PREDIABETES: ICD-10-CM

## 2020-01-28 DIAGNOSIS — M54.41 CHRONIC BILATERAL LOW BACK PAIN WITH BILATERAL SCIATICA: ICD-10-CM

## 2020-01-28 DIAGNOSIS — F10.21 PERSONAL HISTORY OF ALCOHOLISM (HCC): ICD-10-CM

## 2020-01-28 DIAGNOSIS — M15.9 GENERALIZED OSTEOARTHROSIS, INVOLVING MULTIPLE SITES: ICD-10-CM

## 2020-01-28 DIAGNOSIS — I10 ESSENTIAL HYPERTENSION: Primary | ICD-10-CM

## 2020-01-28 DIAGNOSIS — G89.29 CHRONIC BILATERAL LOW BACK PAIN WITH BILATERAL SCIATICA: ICD-10-CM

## 2020-01-28 DIAGNOSIS — E78.5 HYPERLIPIDEMIA, UNSPECIFIED HYPERLIPIDEMIA TYPE: ICD-10-CM

## 2020-01-28 NOTE — PATIENT INSTRUCTIONS
Learning About Meal Planning for Diabetes Why plan your meals? Meal planning can be a key part of managing diabetes. Planning meals and snacks with the right balance of carbohydrate, protein, and fat can help you keep your blood sugar at the target level you set with your doctor. You don't have to eat special foods. You can eat what your family eats, including sweets once in a while. But you do have to pay attention to how often you eat and how much you eat of certain foods. You may want to work with a dietitian or a certified diabetes educator. He or she can give you tips and meal ideas and can answer your questions about meal planning. This health professional can also help you reach a healthy weight if that is one of your goals. What plan is right for you? Your dietitian or diabetes educator may suggest that you start with the plate format or carbohydrate counting. The plate format The plate format is a simple way to help you manage how you eat. You plan meals by learning how much space each food should take on a plate. Using the plate format helps you spread carbohydrate throughout the day. It can make it easier to keep your blood sugar level within your target range. It also helps you see if you're eating healthy portion sizes. To use the plate format, you put non-starchy vegetables on half your plate. Add meat or meat substitutes on one-quarter of the plate. Put a grain or starchy vegetable (such as brown rice or a potato) on the final quarter of the plate. You can add a small piece of fruit and some low-fat or fat-free milk or yogurt, depending on your carbohydrate goal for each meal. 
Here are some tips for using the plate format: · Make sure that you are not using an oversized plate. A 9-inch plate is best. Many restaurants use larger plates. · Get used to using the plate format at home. Then you can use it when you eat out. · Write down your questions about using the plate format. Talk to your doctor, a dietitian, or a diabetes educator about your concerns. Carbohydrate counting With carbohydrate counting, you plan meals based on the amount of carbohydrate in each food. Carbohydrate raises blood sugar higher and more quickly than any other nutrient. It is found in desserts, breads and cereals, and fruit. It's also found in starchy vegetables such as potatoes and corn, grains such as rice and pasta, and milk and yogurt. Spreading carbohydrate throughout the day helps keep your blood sugar levels within your target range. Your daily amount depends on several things, including your weight, how active you are, which diabetes medicines you take, and what your goals are for your blood sugar levels. A registered dietitian or diabetes educator can help you plan how much carbohydrate to include in each meal and snack. A guideline for your daily amount of carbohydrate is: · 45 to 60 grams at each meal. That's about the same as 3 to 4 carbohydrate servings. · 15 to 20 grams at each snack. That's about the same as 1 carbohydrate serving. The Nutrition Facts label on packaged foods tells you how much carbohydrate is in a serving of the food. First, look at the serving size on the food label. Is that the amount you eat in a serving? All of the nutrition information on a food label is based on that serving size. So if you eat more or less than that, you'll need to adjust the other numbers. Total carbohydrate is the next thing you need to look for on the label. If you count carbohydrate servings, one serving of carbohydrate is 15 grams. For foods that don't come with labels, such as fresh fruits and vegetables, you'll need a guide that lists carbohydrate in these foods. Ask your doctor, dietitian, or diabetes educator about books or other nutrition guides you can use.  
If you take insulin, you need to know how many grams of carbohydrate are in a meal. This lets you know how much rapid-acting insulin to take before you eat. If you use an insulin pump, you get a constant rate of insulin during the day. So the pump must be programmed at meals to give you extra insulin to cover the rise in blood sugar after meals. When you know how much carbohydrate you will eat, you can take the right amount of insulin. Or, if you always use the same amount of insulin, you need to make sure that you eat the same amount of carbohydrate at meals. If you need more help to understand carbohydrate counting and food labels, ask your doctor, dietitian, or diabetes educator. How do you get started with meal planning? Here are some tips to get started: 
· Plan your meals a week at a time. Don't forget to include snacks too. · Use cookbooks or online recipes to plan several main meals. Plan some quick meals for busy nights. You also can double some recipes that freeze well. Then you can save half for other busy nights when you don't have time to cook. · Make sure you have the ingredients you need for your recipes. If you're running low on basic items, put these items on your shopping list too. · List foods that you use to make breakfasts, lunches, and snacks. List plenty of fruits and vegetables. · Post this list on the refrigerator. Add to it as you think of more things you need. · Take the list to the store to do your weekly shopping. Follow-up care is a key part of your treatment and safety. Be sure to make and go to all appointments, and call your doctor if you are having problems. It's also a good idea to know your test results and keep a list of the medicines you take. Where can you learn more? Go to http://carolyne-fabiana.info/. Marii Ware in the search box to learn more about \"Learning About Meal Planning for Diabetes. \" Current as of: April 16, 2019 Content Version: 12.2 © 2855-6261 [employer].  Care instructions adapted under license by [facility] [address] (which disclaims liability or warranty for this information). If you have questions about a medical condition or this instruction, always ask your healthcare professional. [address] any warranty or liability for your use of this information. Learning About Diabetes Food Guidelines Your Care Instructions Meal planning is important to manage diabetes. It helps keep your blood sugar at a target level (which you set with your doctor). You don't have to eat special foods. You can eat what your family eats, including sweets once in a while. But you do have to pay attention to how often you eat and how much you eat of certain foods. You may want to work with a dietitian or a certified diabetes educator (CDE) to help you plan meals and snacks. A dietitian or CDE can also help you lose weight if that is one of your goals. What should you know about eating carbs? Managing the amount of carbohydrate (carbs) you eat is an important part of healthy meals when you have diabetes. Carbohydrate is found in many foods. · Learn which foods have carbs. And learn the amounts of carbs in different foods. ? Bread, cereal, pasta, and rice have about 15 grams of carbs in a serving. A serving is 1 slice of bread (1 ounce), ½ cup of cooked cereal, or 1/3 cup of cooked pasta or rice. ? Fruits have 15 grams of carbs in a serving. A serving is 1 small fresh fruit, such as an apple or orange; ½ of a banana; ½ cup of cooked or canned fruit; ½ cup of fruit juice; 1 cup of melon or raspberries; or 2 tablespoons of dried fruit. ? Milk and no-sugar-added yogurt have 15 grams of carbs in a serving. A serving is 1 cup of milk or 2/3 cup of no-sugar-added yogurt. ? Starchy vegetables have 15 grams of carbs in a serving.  A serving is ½ cup of mashed potatoes or sweet potato; 1 cup winter squash; ½ of a small baked potato; ½ cup of cooked beans; or ½ cup cooked corn or green peas. · Learn how much carbs to eat each day and at each meal. A dietitian or CDE can teach you how to keep track of the amount of carbs you eat. This is called carbohydrate counting. · If you are not sure how to count carbohydrate grams, use the Plate Method to plan meals. It is a good, quick way to make sure that you have a balanced meal. It also helps you spread carbs throughout the day. ? Divide your plate by types of foods. Put non-starchy vegetables on half the plate, meat or other protein food on one-quarter of the plate, and a grain or starchy vegetable in the final quarter of the plate. To this you can add a small piece of fruit and 1 cup of milk or yogurt, depending on how many carbs you are supposed to eat at a meal. 
· Try to eat about the same amount of carbs at each meal. Do not \"save up\" your daily allowance of carbs to eat at one meal. 
· Proteins have very little or no carbs per serving. Examples of proteins are beef, chicken, turkey, fish, eggs, tofu, cheese, cottage cheese, and peanut butter. A serving size of meat is 3 ounces, which is about the size of a deck of cards. Examples of meat substitute serving sizes (equal to 1 ounce of meat) are 1/4 cup of cottage cheese, 1 egg, 1 tablespoon of peanut butter, and ½ cup of tofu. How can you eat out and still eat healthy? · Learn to estimate the serving sizes of foods that have carbohydrate. If you measure food at home, it will be easier to estimate the amount in a serving of restaurant food. · If the meal you order has too much carbohydrate (such as potatoes, corn, or baked beans), ask to have a low-carbohydrate food instead. Ask for a salad or green vegetables. · If you use insulin, check your blood sugar before and after eating out to help you plan how much to eat in the future.  
· If you eat more carbohydrate at a meal than you had planned, take a walk or do other exercise. This will help lower your blood sugar. What else should you know? · Limit saturated fat, such as the fat from meat and dairy products. This is a healthy choice because people who have diabetes are at higher risk of heart disease. So choose lean cuts of meat and nonfat or low-fat dairy products. Use olive or canola oil instead of butter or shortening when cooking. · Don't skip meals. Your blood sugar may drop too low if you skip meals and take insulin or certain medicines for diabetes. · Check with your doctor before you drink alcohol. Alcohol can cause your blood sugar to drop too low. Alcohol can also cause a bad reaction if you take certain diabetes medicines. Follow-up care is a key part of your treatment and safety. Be sure to make and go to all appointments, and call your doctor if you are having problems. It's also a good idea to know your test results and keep a list of the medicines you take. Where can you learn more? Go to http://carolyne-fabiana.info/. Enter P473 in the search box to learn more about \"Learning About Diabetes Food Guidelines. \" Current as of: April 16, 2019 Content Version: 12.2 © 4248-8075 [employer] /l?k â€¢ [employer]/, Incorporated. Care instructions adapted under license by [employer] (which disclaims liability or warranty for this information). If you have questions about a medical condition or this instruction, always ask your healthcare professional. [address] any warranty or liability for your use of this information.

## 2020-01-28 NOTE — PROGRESS NOTES
Chief Complaint   Patient presents with    Hypertension     3 month follow up with labs. There are no preventive care reminders to display for this patient. 1. Have you been to the ER, urgent care clinic or hospitalized since your last visit? NO.     2. Have you seen or consulted any other health care providers outside of the [address] [place] [address] since your last visit (Include any pap smears or colon screening)? YES, neurology last seen 2 weeks ago. Do you have an Advanced Directive? YES, patient states she will bring it in. Learning Assessment 8/1/2019   PRIMARY LEARNER Patient   HIGHEST LEVEL OF EDUCATION - PRIMARY LEARNER  SOME COLLEGE   BARRIERS PRIMARY LEARNER NONE   CO-LEARNER CAREGIVER No   PRIMARY LANGUAGE ENGLISH   LEARNER PREFERENCE PRIMARY DEMONSTRATION   ANSWERED BY patient   RELATIONSHIP SELF     Abuse Screening Questionnaire 1/28/2020   Do you ever feel afraid of your partner? N   Are you in a relationship with someone who physically or mentally threatens you? N   Is it safe for you to go home? Y     3 most recent PHQ Screens 1/28/2020   Little interest or pleasure in doing things Not at all   Feeling down, depressed, irritable, or hopeless Not at all   Total Score PHQ 2 0     Fall Risk Assessment, last 12 mths 1/28/2020   Able to walk? Yes   Fall in past 12 months?  No

## 2020-02-01 PROBLEM — M79.18 MYOFASCIAL PAIN SYNDROME OF LUMBAR SPINE: Status: ACTIVE | Noted: 2020-02-01

## 2020-02-01 NOTE — PROGRESS NOTES
HPI:   Tone Cruz is a 59y.o. year old female who presents today for a routine visit. She has a history of hypertension, hyperlipidemia, prediabetes, microscopic colitis, GERD, osteopenia, peripheral neuropathy, and depression. She also has a prior history of alcoholism, but has been sober since 1988. She reports that she is doing reasonably well. She underwent evaluation by Dr. Belinda Guillaume on 12/19/2019 for an incidental finding on her pelvic MRI (11/9/2019) of a large septated cystic mass in the left adnexal area. She underwent a transvaginal ultrasound (12/2/2019) which showed a left adnexal septated cyst measuring 6.3 x 3.0 x 4.3 cm and presumably arises from the left ovary which is otherwise not visualized and believed to have been replaced by the cyst, thin septation noted, but no solid components are appreciated, and no abnormal vascular characteristics; also showed abnormal endometrium with features of adenomyomatosis.  was normal at 14 and recommendation was to obtain a repeat transvaginal ultrasound in 2/2020. She reports today that she is otherwise feeling well. She is continuing to smoke 0.5 ppd due to stress at home and states that she could not tolerate Wellbutrin. She is considering moving to [address] [place] [address] in a few months, although no plans have been finalized. She is otherwise without new complaints. She has had difficulty with low back pain for the last 2 years, which she states first started while hiking the [address] in 2018. She experiences aching pain if sitting and ambulating, and reported that she is no longer able to play tennis due to the discomfort. She denied radiation of the pain into her legs, but she did report bilateral buttock pain. She stated that she underwent evaluation by Dr. Domi Santana in 8/2018, and at that time, her pain was mainly localized to her bilateral hips, and hip x-rays were essentially negative.  He stated that her pain was likely musculoskeletal and advised conservative management. She stated that her pain had worsened while she was caring for her elderly parents, who have since passed away. Since that time, she tried massage, yoga, and chiropractor therapy without significant relief. She was prescribed gabapentin at bedtime by a prior physician, but had not found it to be of benefit. She underwent lumbar x-rays (3/2019) which showed mild disc space narrowing L4-L5 and L5-S1; and bilateral hip and pelvis x-rays (3/2019) which were normal. She denied any fevers, chills, weight change, saddle paresthesia, neurogenic bowel or bladder symptoms, or recent trauma. She was started on diclofenac, but did not find it to be helpful. She was referred to physical therapy, and found dry needling to be of some benefit. She has restarted gabapentin at bedtime and found some benefit. She was also referred to Dr. Alexandrea Hernandez, and he felt that her symptoms were due to cervical and lumbar myofascial pain with piriformis syndrome (L>R), right sacroiliac dysfunction, and coccydynia. He obtained a lumbar MRI (11/9/2019) showing mild degenerative spinal disease but without significant spinal canal or foraminal stenosis; a pelvic MRI (11/9/2019) minimal nonspecific edema in the left sided sacrum, at the SI joint with no joint effusion, erosion or ankylosis, no acute sacral or coccygeal fracture, and mild bilateral hamstring origin tendinosis. She was referred to Dr. Christina Victoria for a ganglion impar block for her coccydynia, and underwent the coccyx injection on 1/17/2020. She reports that she has not noted any significant improvement, but is planning to undergo a second injection. She continues on gabapentin at bedtime, and is continuing to perform stretching exercises at home. She has a history of hypertension, treated with lisinopril and hydrochlorothiazide. She does not monitor her blood pressure at home.  She remains very active, and denies any chest pain, shortness of breath at rest or with exertion, palpitations, lightheadedness, or edema. She has a history of hyperlipidemia, and was started on moderate intensity dose atorvastatin at her last visit in 8/2019 when baseline . She has a history of prediabetes, with elevated fasting blood sugar evident since at least 2016. HbA1c was first measured in 3/2019 and was elevated at 6.1. She denies any polyuria, polydipsia, nocturia, or blurry vision, and has no history of retinopathy or nephropathy. She has a long history of peripheral neuropathy, which may be painful at times. She states that it started over 30 years ago, but has remained stable without worsening. In 3/2019, evaluation included a normal B12 level. SPEP was also ordered, and showed a mild elevation of beta globulins, but no M-spike was observed. She has a history of GERD, which has been treated in the past with omeprazole. In 4/2018, she underwent colonoscopy by Dr. Carson Rich for colon cancer screening as well as to evaluate diarrhea. It showed moderate sigmoid diverticulosis, a 5 mm sessile rectal polyp, and random biopsies were taken with pathology showing lymphocytic colitis. Follow-up recommended for 5 years. She was treated with budesonide with significant improvement. She states that she successfully weaned from taking budesonide for the last year, and has not had a recurrence of symptoms. She denies any abdominal pain, nausea, vomiting, melena, hematochezia, or change in bowel movements. She has a history of osteopenia with last bone density study on 8/20/2019 showing T-scores:  femoral neck left -1.7  /right -1.8 and lumbar -2.4 . She continues to take calcium and Vitamin D supplements. She has no history of pathologic fractures. She has a history of depression and alcoholism, but has remained sober since 1988. She continues to smoke 0.5 ppd and has been smoking for the last 40 years.  She has been unsuccessful at stopping despite treatment with Chantix and Wellbutrin SR. She denies any cough or dyspnea. She has difficulty with insomnia and uses melatonin with good effect. Past Medical History:   Diagnosis Date    Depression     Essential hypertension     Generalized osteoarthrosis, involving multiple sites     GERD (gastroesophageal reflux disease)     Microscopic colitis 8/5/2019    Osteopenia of multiple sites 3/29/2019    Peripheral polyneuropathy 8/5/2019    Personal history of alcoholism ([address]     Prediabetes 04/10/2019     Past Surgical History:   Procedure Laterality Date    HX CHOLECYSTECTOMY      HX COLONOSCOPY  9/2010; 4/2018    (Erin Balderas) diverticuli; 2nd colo showed polyp/lymphocytic colitis    HX TONSIL AND ADENOIDECTOMY      HX TUBAL LIGATION Bilateral      Current Outpatient Medications   Medication Sig    gabapentin (NEURONTIN) 300 mg capsule Take 1 Cap by mouth nightly. Max Daily Amount: 300 mg.    atorvastatin (LIPITOR) 10 mg tablet Take 1 Tab by mouth daily.  lisinopril-hydroCHLOROthiazide (PRINZIDE, ZESTORETIC) 20-12.5 mg per tablet Take 1 Tab by mouth daily.  calcium carbonate-vitamin D3 1,000 mg(2,500 mg)-800 unit tab Take 1 Tab by mouth daily.  melatonin 10 mg cap Take 10 mg by mouth.  nystatin (MYCOSTATIN) topical cream Apply  to affected area two (2) times a day.  buPROPion (WELLBUTRIN) 100 mg tablet Take 1 tab po q12 hours x 3 days, then increase to 1 tab po q8 hours. Stop smoking on day 8.    albuterol (PROVENTIL HFA, VENTOLIN HFA, PROAIR HFA) 90 mcg/actuation inhaler Take 1-2 Puffs by inhalation every four (4) hours as needed for Wheezing. No current facility-administered medications for this visit. Allergies and Intolerances: Allergies   Allergen Reactions    Celebrex [Celecoxib] Rash and Palpitations     Family History: She has no family history of colon or breast cancer. Her mother had an MI at the age of 61, and her father had CAD and CHF.    Family History Problem Relation Age of Onset    Arthritis-osteo Mother     Ulcerative Colitis Mother     Breast Cancer Mother         breast, age70s    Heart Disease Father         chf    Hypertension Brother     Diabetes Brother     Elevated Lipids Brother     No Known Problems Brother     No Known Problems Brother      Social History:   She  reports that she has been smoking. She has a 20.00 pack-year smoking history. She quit smokeless tobacco use about 4 years ago. She continues to smoke 0.5 ppd x 40 years. Social History     Substance and Sexual Activity   Alcohol Use No    Alcohol/week: 0.0 standard drinks    Comment: quit 1988     Immunization History:  Immunization History   Administered Date(s) Administered    Influenza Vaccine (Quad) PF 09/09/2016, 09/18/2017, 11/08/2018, 10/08/2019    Td 08/15/2006    Tdap 09/18/2017    Zoster Recombinant 03/26/2019, 08/01/2019    Zoster Vaccine, Live 10/02/2017       Review of Systems:   As above included in HPI. Otherwise 11 point review of systems negative including constitutional, skin, HENT, eyes, respiratory, cardiovascular, gastrointestinal, genitourinary, musculoskeletal, endocrine, hematologic, allergy, and neurologic. Physical:   Vitals:   BP: 122/64  HR: 68  WT: 126 lb (57.2 kg)  BMI:  24.61 kg/m2    Exam:   Patient appears in no apparent distress. Affect is appropriate. HEENT: PERRLA, anicteric, oropharynx clear, no JVD, adenopathy or thyromegaly. No carotid bruits or radiated murmur. Lungs: clear to auscultation, no wheezes, rhonchi, or rales. Heart: regular rate and rhythm. No murmur, rubs, gallops  Abdomen: soft, nontender, nondistended, normal bowel sounds, no hepatosplenomegaly or masses. Extremities: without edema. Pulses 1-2+ bilaterally. Back: no tenderness to palpation over spine; negative straight leg raises bilaterally.      Review of Data:  Labs:  Lab Only on 01/22/2020   Component Date Value Ref Range Status    Creatinine, urine 2020 98  mg/dL Final    Microalbumin, urine 2020 <12.0  0.1 - 17.0 mg/L Final    Microalb/Creat ratio (ug/mg creat.) 2020    Final   Orders Only on 2020   Component Date Value Ref Range Status    Hemoglobin A1c 2020 6.2* 4.8 - 5.6 % Final    AVG GLU 2020 130* 91 - 123 mg/dL Final   Appointment on 2020   Component Date Value Ref Range Status    VITAMIN D, 25-HYDROXY 2020 33.8  32.0 - 100.0 ng/mL Final    Triglyceride 2020 94  40 - 149 mg/dL Final    HDL Cholesterol 2020 41  >=40 mg/dL Final    Cholesterol, total 2020 160  110 - 200 mg/dL Final    CHOLESTEROL/HDL 2020 3.9  0.0 - 5.0 Final    Non-HDL Cholesterol 2020 119  <130 mg/dL Final    LDL, calculated 2020 100* 50 - 99 mg/dL Final    VLDL, calculated 2020 19  8 - 30 mg/dL Final    LDL/HDL Ratio 2020 2.4   Final    Magnesium 2020 2.0  1.6 - 2.5 mg/dL Final    Glucose 2020 90  70 - 99 mg/dL Final    BUN 2020 22  6 - 22 mg/dL Final    Creatinine 2020 0.6* 0.8 - 1.4 mg/dL Final    Sodium 2020 146* 133 - 145 mmol/L Final    Potassium 2020 4.6  3.5 - 5.5 mmol/L Final    Chloride 2020 105  98 - 110 mmol/L Final    CO2 2020 22  20 - 32 mmol/L Final    Calcium 2020 9.8  8.4 - 10.5 mg/dL Final    Anion gap 2020 19.0  mmol/L Final    GFRAA 2020 >60.0  >60.0 Final    GFRNA 2020 >60.0  >60.0 Final     Health Maintenance:  Screening:    Mammogram: negative (2019)   PAP smear: well women exams with Dr. Daniel Cabrera (last pap smear/HPV negative 2017)   Colorectal: colonoscopy (2018) Dr. Meng Burgos. Due .    Depression: none currently   DM (HbA1c/FPG): HbA1c 6.2 (2020)   Hepatitis C: negative (3/2019)   Falls: none   DEXA: osteopenia (2019)   Glaucoma: regular eye exams with Dr. Giovani Hagan (last 2019)   Smokin.5 ppd x 40 years   Vitamin D: 33.8 (2020)   Medicare Wellness: N/A   Physical: due 4/2020        Impression:  Patient Active Problem List   Diagnosis Code    Personal history of alcoholism ([facility] [address] F10.21    Generalized osteoarthrosis, involving multiple sites M15.9    GERD (gastroesophageal reflux disease) K21.9    Depression F32.9    Hyperlipidemia E78.5    Essential hypertension I10    Chronic peripheral neuropathic pain M79.2, G89.29    Osteopenia of multiple sites M85.89    Prediabetes R73.03    Chronic bilateral low back pain with bilateral sciatica M54.42, M54.41, G89.29    Vitamin D deficiency E55.9    Peripheral polyneuropathy G62.9    Current every day smoker F17.200    Microscopic colitis K52.839    Sacrococcygeal disorders, not elsewhere classified M53.3       Plan:  1. Hypertension. Blood pressure well controlled on current regimen of lisinopril 20 mg daily and hydrochlorothiazide 12.5 mg daily. Renal function has been normal with creatinine 0.6/ eGFR >60. Will continue to follow. 2. Hyperlipidemia. Calculated 10 year ASCVD risk 13 %, which placed her in one of the four statin benefit groups as per new AHA/ACC guidelines (primary prevention: 10 year ASCVD risk >7.5%). LDL was significantly elevated at 158 and HDL 41 (8/1/2019) and started on atorvastatin 10 mg daily. Repeat lipid panel today with LDL improved to 100 and HDL 41, which is reasonable improvement in this patient. Goal LDL <100 given no history of ASCVD. Emphasized importance of lifestyle modifications, including diet, exercise, and weight loss. Will recheck lipid panel at next visit. Continue to follow. 3. Prediabetes. Elevated fasting blood sugar present since at least 2016, but HbA1c first checked in 3/2019 and found to be elevated at 6.1. Improved last visit to 5.8, but now 6.2 today. No evidence of retinopathy or nephropathy, but does have long standing peripheral neuropathy. On Ace-I and started on statin. Completed eye exam with Dr. Kunal Ojeda.  Foot exam essentially with normal sensation (10/2019) to vibration and pin prick, but patient describes hypersensitivity and burning of the soles of her feet. Urine microalbumin/ creatinine ratio negative today. 4. Low back pain. Patient reported difficulty with low back pain for the last 2 years after hiking. Reports exacerbated by caring for her elderly parents prior to their death. No improvement with conservative measures including yoga, massage, and chiropractor care. Lumbar x-ray with evidence of mild degenerative disc changes at L4/5 and L5/S1. Patient reported pain in back and buttocks. Completed physical therapy with some benefit from dry needling. Finding some benefit from gabapentin at bedtime. Underwent evaluation by Dr. Kyle Bae and symptoms felt to be myofascial in origin with coccodynia. Lumbar and pelvic MRI without significant pathology to explain pain. Referred to Dr. Gloria Rivera and underwent coccyx injection without much change, but planning for repeat. Also completed PT and performing exercises at home. Will continue to follow. 5. Left adnexal cyst. Incidental finding on pelvic MRI from 11/2019 showing large septated cystic mass in left adnexal area. Underwent pelvic TV ultrasound which confirmed cyst replacing left ovary, but did not notice any concerning features in cyst. Evaluated by Dr. Dudley Brian, and  normal. Planning for repeat ultrasound in 2/2020.   6. Osteopenia. Last bone density scan 8/2019. Using femoral neck T-scores, calculated FRAX score estimates her 10 year risk of a major osteoporetic fracture at 9.4 % and hip fracture at 1.2 %, which are not an indication for biphosphonate treatment (>20% and >3%, respectively). Significantly decreased in her lumbar spine, approaching the level which would be considered osteoporosis and will need to follow. Continue calcium and Vitamin D. Encouraged exercise, particularly weight bearing activities. [place] 7. Microscopic colitis.  Diagnosed in 4/2018 during random colonic biopsies performed due to complaint of diarrhea. Weaned successfully off of budesonide treatment 1 year ago and has not had a recurrence since that time. Will continue to follow. 8. Peripheral neuropathy. Patient reports that it has been present for over 30 years. B12 and SPEP normal. HbA1c is elevated, but doubt that was responsible. May be related to prior alcohol abuse and small fiber neuropathy associated with excessive alcohol. She states that she does often have pain in her feet, but she has learned to cope with the discomfort and does not wish to receive medication. She also has a history of plantar fasciitis which may exacerbate the pain in her feet. Will continue to follow. 9. Smoking cessation. Discussed at length with the patient, including benefits of improved lung function as well as decreased risk of cardiovascular disease and cancer. Medication and non-pharmacologic options explored. Reviewed strategies to maximize success, including removing cigarettes from environment, addressing triggers, and stress management. She describes inability to tolerate Chantix and Wellbutrin due to bad dreams, but states that she was prescribed the SR formulation of Wellbutrin. Attempted trial of Wellbutrin IR but did not tolerate. Not wishing to attempt cessation currently due to stress at home. Total time spent on topic <10 min. 10. GERD. Currently without symptoms and no longer needing PPI. States that has learned to avoid foods that act as triggers. Follow. 11. Depression. She has a history of alcoholism and has attended AA for the last 31 years. She does use melatonin for insomnia, but does not have any active symptoms of depression. Will continue to follow. 12. Health maintenance. Already received influenza vaccine. She received 2/2 doses of Shingrix vaccine. Discussed Pneumovax 23 (indication: current smoker) and patient wishing to ask pharmacy if she received. Received Tdap.  Other immunizations up to date. Mammogram up to date. Pap smear up to date. Colonoscopy due in 2023. Vitamin D level now normal. Continue maintenance dose supplement. Emphasized importance of lifestyle modifications, including diet, exercise, and weight loss. Smoking cessation as above. Does not meet criteria for low dose lung cancer screening due to 20 pack year smoking history. Patient understands recommendations and agrees with plan.   Follow-up in 3 months for physical.

## 2020-02-07 PROBLEM — M46.1 SACROILIITIS, NOT ELSEWHERE CLASSIFIED (HCC): Status: ACTIVE | Noted: 2020-02-07

## 2020-02-10 ENCOUNTER — HOSPITAL ENCOUNTER (OUTPATIENT)
Dept: ULTRASOUND IMAGING | Age: 65
Discharge: HOME | End: 2020-02-10
Attending: SPECIALIST
Payer: MEDICAID

## 2020-02-10 DIAGNOSIS — N83.209 CYST OF OVARY, UNSPECIFIED LATERALITY: ICD-10-CM

## 2020-02-10 PROCEDURE — 93975 VASCULAR STUDY: CPT

## 2020-02-27 ENCOUNTER — HOSPITAL ENCOUNTER (OUTPATIENT)
Dept: LAB | Age: 65
Discharge: HOME | End: 2020-02-27

## 2020-02-27 LAB — SENTARA SPECIMEN COL,SENBCF: NORMAL

## 2020-02-27 PROCEDURE — 99001 SPECIMEN HANDLING PT-LAB: CPT

## 2020-03-10 ENCOUNTER — TELEPHONE (OUTPATIENT)
Dept: OBGYN CLINIC | Age: 65
End: 2020-03-10

## 2020-03-10 NOTE — TELEPHONE ENCOUNTER
Spoke with this patient concerning MB,explained to her that she did not need a  to accompanied her to that appointment she voice understanding

## 2020-03-13 ENCOUNTER — OFFICE VISIT (OUTPATIENT)
Dept: OBGYN CLINIC | Age: 65
End: 2020-03-13

## 2020-03-13 VITALS
WEIGHT: 125 LBS | BODY MASS INDEX: 24.54 KG/M2 | HEART RATE: 77 BPM | HEIGHT: 60 IN | SYSTOLIC BLOOD PRESSURE: 130 MMHG | DIASTOLIC BLOOD PRESSURE: 62 MMHG | TEMPERATURE: 98.7 F

## 2020-03-13 DIAGNOSIS — Z53.8 PROCEDURE, TEST, OR EXAM NOT INDICATED: Primary | ICD-10-CM

## 2020-03-13 DIAGNOSIS — R93.89 THICKENED ENDOMETRIUM: ICD-10-CM

## 2020-03-13 LAB
HCG URINE, QL. (POC): NEGATIVE
VALID INTERNAL CONTROL?: YES

## 2020-03-13 NOTE — PROCEDURES
Endometrial Biopsy Procedure Note    Endometrial biopsy was performed today. Indications: thickened endometrium    Procedure Details   Urine pregnancy test was done and result is negative. The risks (including infection, bleeding, pain, and uterine perforation) and benefits of the procedure were explained to the patient and written informed consent was obtained. Antibiotic prophylaxis against endocarditis was not indicated. The patient was placed in the dorsal lithotomy position. A speculum inserted in the vagina, and the cervix prepped with povidone iodine. A sharp tenaculum was applied to the anterior lip of the cervix for stabilization. A sterile uterine sound was used to sound the uterus to a depth of 6 cm. A Pipelle endometrial aspirator was used to sample the endometrium. Sample was sent for pathologic examination. The patient tolerated the procedure well and there were no complications. Plan:  The patient was advised to call for any fever or for prolonged or severe pain or bleeding. She was advised to use OTC ibuprofen as needed for mild to moderate pain. She was advised to avoid vaginal intercourse for 48 hours or until the bleeding has completely stopped. My office will get in touch with her as soon as we have the pathology report.

## 2020-03-13 NOTE — PROGRESS NOTES
PREGNANCY TEST= NEG        Christiano Hong presents today for   Chief Complaint   Patient presents with    Endometrial Biopsy       Is someone accompanying this pt? no  Patient does not have any concerns at this time. Depression screening     Is the patient using any DME equipment during OV? no    Depression Screening:  3 most recent PHQ Screens 1/28/2020   Little interest or pleasure in doing things Not at all   Feeling down, depressed, irritable, or hopeless Not at all   Total Score PHQ 2 0       Learning Assessment:  Learning Assessment 8/1/2019   PRIMARY LEARNER Patient   HIGHEST LEVEL OF EDUCATION - PRIMARY LEARNER  SOME COLLEGE   BARRIERS PRIMARY LEARNER NONE   CO-LEARNER CAREGIVER No   PRIMARY LANGUAGE ENGLISH   LEARNER PREFERENCE PRIMARY DEMONSTRATION   ANSWERED BY patient   RELATIONSHIP SELF       Abuse Screening:  Abuse Screening Questionnaire 1/28/2020   Do you ever feel afraid of your partner? N   Are you in a relationship with someone who physically or mentally threatens you? N   Is it safe for you to go home? Y       Fall Risk  Fall Risk Assessment, last 12 mths 1/28/2020   Able to walk? Yes   Fall in past 12 months? No       This Visit Test  Results for orders placed or performed during the hospital encounter of 02/27/20   [address] [place] [address]. Result Value Ref Range    [facility] SPECIM[facility] Specimens collected/sent to [facility] Maintenance reviewed and discussed and ordered per Provider. Health Maintenance Due   Topic Date Due    Breast Cancer Screen Mammogram  04/11/2020   . Printed and placed on desk for review.

## 2020-03-17 LAB — SPECIMEN FOR PATHOLOGY - OTHER, 18585: NORMAL

## 2020-03-23 ENCOUNTER — TELEPHONE (OUTPATIENT)
Dept: ORTHOPEDIC SURGERY | Age: 65
End: 2020-03-23

## 2020-03-23 ENCOUNTER — TELEPHONE (OUTPATIENT)
Dept: OBGYN CLINIC | Age: 65
End: 2020-03-23

## 2020-03-23 NOTE — TELEPHONE ENCOUNTER
Patient is inquiring if we still have her MRI disc in office - she's getting ready to re-locate and would like to pick it up. She left it at her last appointment back in November. Please advise patient at [phone].

## 2020-03-26 NOTE — TELEPHONE ENCOUNTER
Called regarding pathology showing endometrial polyp. Discussed implications and need for outpatient surgery for removal. Patient states that she will be moving to [place], [place] and will likely need her records transferred to another office once she is able to fully transfer to another GYN in the area where she moves. Additionally, she expressed concern regarding her previous visit as she states she did not receive a pad prior to leaving and had significant bleeding the day of the procedure and thereafter for several days. It has now resolved.

## 2020-04-21 RX ORDER — ATORVASTATIN CALCIUM 10 MG/1
10 TABLET, FILM COATED ORAL DAILY
Qty: 90 TAB | Refills: 2 | Status: SHIPPED | OUTPATIENT
Start: 2020-04-21

## 2020-04-21 NOTE — TELEPHONE ENCOUNTER
90 days      Pt has appt with Dr. Barbara Carrillo soon. She has moved to [facility] recently but hasnt been able to get a new doctor yet due to the virus. Wants to know if Dr. Barbara Carrillo wants to see her one more time virtually until she can get into a doctor there so she can get her refill. She won't be able to get here for labs.

## 2020-04-23 ENCOUNTER — VIRTUAL VISIT (OUTPATIENT)
Dept: INTERNAL MEDICINE CLINIC | Age: 65
End: 2020-04-23

## 2020-04-23 DIAGNOSIS — G89.29 CHRONIC BILATERAL LOW BACK PAIN WITH BILATERAL SCIATICA: ICD-10-CM

## 2020-04-23 DIAGNOSIS — M54.42 CHRONIC BILATERAL LOW BACK PAIN WITH BILATERAL SCIATICA: ICD-10-CM

## 2020-04-23 DIAGNOSIS — M54.41 CHRONIC BILATERAL LOW BACK PAIN WITH BILATERAL SCIATICA: ICD-10-CM

## 2020-04-23 DIAGNOSIS — M46.1 SACROILIITIS, NOT ELSEWHERE CLASSIFIED (HCC): ICD-10-CM

## 2020-04-23 DIAGNOSIS — F33.42 RECURRENT MAJOR DEPRESSIVE DISORDER, IN FULL REMISSION (HCC): ICD-10-CM

## 2020-04-23 DIAGNOSIS — K21.9 GASTROESOPHAGEAL REFLUX DISEASE, ESOPHAGITIS PRESENCE NOT SPECIFIED: ICD-10-CM

## 2020-04-23 DIAGNOSIS — K52.832 LYMPHOCYTIC COLITIS: ICD-10-CM

## 2020-04-23 DIAGNOSIS — M85.89 OSTEOPENIA OF MULTIPLE SITES: ICD-10-CM

## 2020-04-23 DIAGNOSIS — F17.200 CURRENT EVERY DAY SMOKER: ICD-10-CM

## 2020-04-23 DIAGNOSIS — F10.21 PERSONAL HISTORY OF ALCOHOLISM (HCC): ICD-10-CM

## 2020-04-23 DIAGNOSIS — M79.18 MYOFASCIAL PAIN SYNDROME OF LUMBAR SPINE: ICD-10-CM

## 2020-04-23 DIAGNOSIS — R73.03 PREDIABETES: ICD-10-CM

## 2020-04-23 DIAGNOSIS — E78.5 HYPERLIPIDEMIA, UNSPECIFIED HYPERLIPIDEMIA TYPE: ICD-10-CM

## 2020-04-23 DIAGNOSIS — G62.9 PERIPHERAL POLYNEUROPATHY: ICD-10-CM

## 2020-04-23 DIAGNOSIS — E55.9 VITAMIN D DEFICIENCY: ICD-10-CM

## 2020-04-23 DIAGNOSIS — I10 ESSENTIAL HYPERTENSION: Primary | ICD-10-CM

## 2020-04-23 RX ORDER — GABAPENTIN 100 MG/1
CAPSULE ORAL
Qty: 150 CAP | Refills: 1 | Status: SHIPPED | OUTPATIENT
Start: 2020-04-23

## 2020-04-23 NOTE — PROGRESS NOTES
Reviewed report generated by the [facility]. Does not demonstrate aberrancies or inconsistencies with regard to the prescribing of controlled medications to this patient by other providers. Last filled gabapentin 3/18/2020 per .

## 2020-04-25 NOTE — PROGRESS NOTES
Lucia Tobar is a 72 y.o. female who was seen by synchronous (real-time) audio-video technology on 4/23/2020. Consent: Lucia Tobar, who was seen by synchronous (real-time) audio-video technology, and/or her healthcare decision maker, is aware that this patient-initiated, Telehealth encounter on 4/23/2020 is a billable service, with coverage as determined by her insurance carrier. She is aware that she may receive a bill and has provided verbal consent to proceed: Yes. HPI:   Lucia Tobar is a 72y.o. year old female who has a history of hypertension, hyperlipidemia, prediabetes, microscopic colitis, GERD, osteopenia, peripheral neuropathy, and depression. She also has a prior history of alcoholism, but has been sober since 1988. She reports that she has now relocated to [place], [place], but has not yet been able to establish care with a new PCP due to the pandemic. She reports that on 2/7/2020 and 3/5/2020, she received two more coccyx injections by Dr. Laura Paez of pain management. However, she reports today that they were not helpful and she is continuing to experience pain in her lower buttocks when sitting which is exacerbated with walking. She states that she is continuing to perform stretches and exercises from physical therapy, and she does take Tylenol and gabapentin 300 mg at bedtime. She is otherwise without new complaints. She has had difficulty with low back pain for the last 2 years, which she states first started while hiking the [id number] in 2018. She experiences aching pain if sitting and ambulating, and reported that she is no longer able to play tennis due to the discomfort. She denied radiation of the pain into her legs, but she did report bilateral buttock pain. She stated that she underwent evaluation by Dr. Diana Grajeda in 8/2018, and at that time, her pain was mainly localized to her bilateral hips, and hip x-rays were essentially negative.  He stated that her pain was likely musculoskeletal and advised conservative management. She stated that her pain had worsened while she was caring for her elderly parents, who have since passed away. Since that time, she tried massage, yoga, and chiropractor therapy without significant relief. She was prescribed gabapentin at bedtime by a prior physician, but had not found it to be of benefit. She underwent lumbar x-rays (3/2019) which showed mild disc space narrowing L4-L5 and L5-S1; and bilateral hip and pelvis x-rays (3/2019) which were normal. She denied any fevers, chills, weight change, saddle paresthesia, neurogenic bowel or bladder symptoms, or recent trauma. She was started on diclofenac, but did not find it to be helpful. She was referred to physical therapy, and found dry needling to be of some benefit. She has restarted gabapentin at bedtime and found some benefit. She was also referred to Dr. Irma Sarmiento, and he felt that her symptoms were due to cervical and lumbar myofascial pain with piriformis syndrome (L>R), right sacroiliac dysfunction, and coccydynia. He obtained a lumbar MRI (11/9/2019) showing mild degenerative spinal disease but without significant spinal canal or foraminal stenosis; a pelvic MRI (11/9/2019) minimal nonspecific edema in the left sided sacrum, at the SI joint with no joint effusion, erosion or ankylosis, no acute sacral or coccygeal fracture, and mild bilateral hamstring origin tendinosis. She was referred to Dr. Donnald Kussmaul for a ganglion impar block for her coccydynia, and underwent the coccyx injection on 1/17/2020. She reports that she has not noted any significant improvement, but is planning to undergo a second injection. She continues on gabapentin at bedtime, and is continuing to perform stretching exercises at home. She has a history of hypertension, treated with lisinopril and hydrochlorothiazide. She does not monitor her blood pressure at home.  She remains very active, and denies any chest pain, shortness of breath at rest or with exertion, palpitations, lightheadedness, or edema. She has a history of hyperlipidemia, and was started on moderate intensity dose atorvastatin at her last visit in 8/2019 when baseline . She has a history of prediabetes, with elevated fasting blood sugar evident since at least 2016. HbA1c was first measured in 3/2019 and was elevated at 6.1. She denies any polyuria, polydipsia, nocturia, or blurry vision, and has no history of retinopathy or nephropathy. She has a long history of peripheral neuropathy, which may be painful at times. She states that it started over 30 years ago, but has remained stable without worsening. In 3/2019, evaluation included a normal B12 level. SPEP was also ordered, and showed a mild elevation of beta globulins, but no M-spike was observed. On 12/19/2019, she underwent evaluation by Dr. Hyun Weber for an incidental finding on her pelvic MRI (11/9/2019) of a large septated cystic mass in the left adnexal area. She underwent a transvaginal ultrasound (12/2/2019) which showed a left adnexal septated cyst measuring 6.3 x 3.0 x 4.3 cm and presumably arises from the left ovary which is otherwise not visualized and believed to have been replaced by the cyst, thin septation noted, but no solid components are appreciated, and no abnormal vascular characteristics; also showed abnormal endometrium with features of adenomyomatosis.  was normal at 14 and recommendation was to obtain a repeat transvaginal ultrasound in 2/2020, which showed the left ovarian cystic lesion was slightly decreased in size since prior ultrasound. It also showed thickening and abnormal appearance of the endometrium, and she underwent an endometrial biopsy on 3/13/2020, with pathology showing an endometrial polyp with cystic changes. She was advised that she needed removal of the polyp, but has not yet had this performed due to the pandemic.     She has a history of GERD, which has been treated in the past with omeprazole. In 4/2018, she underwent colonoscopy by Dr. Alexa Luis for colon cancer screening as well as to evaluate diarrhea. It showed moderate sigmoid diverticulosis, a 5 mm sessile rectal polyp, and random biopsies were taken with pathology showing lymphocytic colitis. Follow-up recommended for 5 years. She was treated with budesonide with significant improvement. She states that she successfully weaned from taking budesonide for the last year, and has not had a recurrence of symptoms. She denies any abdominal pain, nausea, vomiting, melena, hematochezia, or change in bowel movements. She has a history of osteopenia with last bone density study on 8/20/2019 showing T-scores:  femoral neck left -1.7  /right -1.8 and lumbar -2.4 . She continues to take calcium and Vitamin D supplements. She has no history of pathologic fractures. She has a history of depression and alcoholism, but has remained sober since 1988. She continues to smoke 0.5 ppd and has been smoking for the last 40 years. She has been unsuccessful at stopping despite treatment with Chantix and Wellbutrin SR. She denies any cough or dyspnea. She has difficulty with insomnia and uses melatonin with good effect.        Past Medical History:   Diagnosis Date    Depression     Essential hypertension     Generalized osteoarthrosis, involving multiple sites     GERD (gastroesophageal reflux disease)     Microscopic colitis 8/5/2019    Osteopenia of multiple sites 3/29/2019    Peripheral polyneuropathy 8/5/2019    Personal history of alcoholism ([facility] [address]     Prediabetes 04/10/2019     Past Surgical History:   Procedure Laterality Date    HX CHOLECYSTECTOMY      HX COLONOSCOPY  9/2010; 4/2018    (Joyce Ocampo) diverticuli; 2nd colo showed polyp/lymphocytic colitis    HX TONSIL AND ADENOIDECTOMY      HX TUBAL LIGATION Bilateral      Current Outpatient Medications   Medication Sig    gabapentin (NEURONTIN) 100 mg capsule Take 100 mg (1 cap) in the morning and midday, and 300 mg (3 caps) at bedtime. Avoid administration w/ aluminum or magnesium meds within 2 hr.  atorvastatin (LIPITOR) 10 mg tablet Take 1 Tab by mouth daily.  nystatin (MYCOSTATIN) topical cream Apply  to affected area two (2) times a day.  albuterol (PROVENTIL HFA, VENTOLIN HFA, PROAIR HFA) 90 mcg/actuation inhaler Take 1-2 Puffs by inhalation every four (4) hours as needed for Wheezing.  lisinopril-hydroCHLOROthiazide (PRINZIDE, ZESTORETIC) 20-12.5 mg per tablet Take 1 Tab by mouth daily.  calcium carbonate-vitamin D3 1,000 mg(2,500 mg)-800 unit tab Take 1 Tab by mouth daily.  melatonin 10 mg cap Take 10 mg by mouth. No current facility-administered medications for this visit. Allergies and Intolerances: Allergies   Allergen Reactions    Celebrex [Celecoxib] Rash and Palpitations     Family History: She has no family history of colon or breast cancer. Her mother had an MI at the age of 61, and her father had CAD and CHF. Family History   Problem Relation Age of Onset   [de-identified] Arthritis-osteo Mother     Ulcerative Colitis Mother     Breast Cancer Mother         breast, age70s    Heart Disease Father         chf    Hypertension Brother     Diabetes Brother     Elevated Lipids Brother     No Known Problems Brother     No Known Problems Brother      Social History:   She  reports that she has been smoking. She has a 20.00 pack-year smoking history. She quit smokeless tobacco use about 4 years ago. She continues to smoke 0.5 ppd x 40 years.   Social History     Substance and Sexual Activity   Alcohol Use No    Alcohol/week: 0.0 standard drinks    Comment: quit 1988     Immunization History:  Immunization History   Administered Date(s) Administered    Influenza Vaccine (Quad) PF 09/09/2016, 09/18/2017, 11/08/2018, 10/08/2019    Td 08/15/2006    Tdap 09/18/2017    Zoster Recombinant 03/26/2019, 08/01/2019  Zoster Vaccine, Live 10/02/2017       Review of Systems:   As above included in HPI. Otherwise 11 point review of systems negative including constitutional, skin, HENT, eyes, respiratory, cardiovascular, gastrointestinal, genitourinary, musculoskeletal, endocrine, hematologic, allergy, and neurologic. Physical:     Objective: There were no vitals taken for this visit. General: alert, cooperative, no distress   Mental  status: normal mood, behavior, speech, dress, motor activity, and thought processes, able to follow commands   HENT: NCAT   Neck: no visualized mass   Resp: no respiratory distress   Neuro: no gross deficits   Skin: no discoloration or lesions of concern on visible areas   Psychiatric: normal affect, consistent with stated mood, no evidence of hallucinations     Review of Data:  Labs:  No visits with results within 1 Month(s) from this visit. Latest known visit with results is:   Office Visit on 2020   Component Date Value Ref Range Status    VALID INTERNAL CONTROL POC 2020 Yes   Final    HCG urine, Ql. (POC) 2020 Negative  Negative Final    SPECIMEN FOR PATHOLOGY - OTHER 2020    Final     Health Maintenance:  Screening:    Mammogram: negative (2019)   PAP smear: well women exams with Dr. Rashel Johnson (last pap smear/HPV negative 2017)   Colorectal: colonoscopy (2018) Dr. Cirilo Clement. Due .    Depression: none currently   DM (HbA1c/FPG): HbA1c 6.2 (2020)   Hepatitis C: negative (3/2019)   Falls: none   DEXA: osteopenia (2019)   Glaucoma: regular eye exams with Dr. Elizabeth Morris (last 2019)   Smokin.5 ppd x 40 years   Vitamin D: 33.8 (2020)   Medicare Wellness: N/A   Physical: due 2020        Impression:  Patient Active Problem List   Diagnosis Code    Personal history of alcoholism ([facility] [address] F10.21    Generalized osteoarthrosis, involving multiple sites M15.9    GERD (gastroesophageal reflux disease) K21.9    Depression F32.9    Hyperlipidemia E78.5  Essential hypertension I10    Chronic peripheral neuropathic pain M79.2, G89.29    Osteopenia of multiple sites M85.89    Prediabetes R73.03    Chronic bilateral low back pain with bilateral sciatica M54.42, M54.41, G89.29    Vitamin D deficiency E55.9    Peripheral polyneuropathy G62.9    Current every day smoker F17.200    Microscopic colitis K52.839    Sacrococcygeal disorders, not elsewhere classified M53.3    Myofascial pain syndrome of lumbar spine M79.18    Sacroiliitis, not elsewhere classified ([facility] [address] M46.1       Plan:  1. Hypertension. Blood pressure remains well controlled on current regimen of lisinopril 20 mg daily and hydrochlorothiazide 12.5 mg daily. Renal function has been normal with creatinine 0.6/ eGFR >60.   2. Hyperlipidemia. Calculated 10 year ASCVD risk 13 %, which placed her in one of the four statin benefit groups as per new AHA/ACC guidelines (primary prevention: 10 year ASCVD risk >7.5%). LDL was significantly elevated at 158 and HDL 41 (8/1/2019) and started on atorvastatin 10 mg daily. Repeat lipid panel in 1/2020 with LDL improved to 100 and HDL 41, which is reasonable improvement in this patient. Goal LDL <100 given no history of ASCVD. Emphasized importance of lifestyle modifications, including diet, exercise, and weight loss. Will recheck lipid panel at next visit. Continue to follow. 3. Prediabetes. Elevated fasting blood sugar present since at least 2016, but HbA1c first checked in 3/2019 and found to be elevated at 6.1. Improved last visit to 5.8, but now 6.2 today. No evidence of retinopathy or nephropathy, but does have long standing peripheral neuropathy. On Ace-I and started on statin. Completed eye exam with Dr. Alisia Cotto. Foot exam essentially with normal sensation (10/2019) to vibration and pin prick, but patient describes hypersensitivity and burning of the soles of her feet. Urine microalbumin/ creatinine ratio negative today. 4. Low back pain.  Patient reported difficulty with low back pain for the last 2 years after hiking. Reports exacerbated by caring for her elderly parents prior to their death. No improvement with conservative measures including yoga, massage, and chiropractor care. Lumbar x-ray with evidence of mild degenerative disc changes at L4/5 and L5/S1. Patient reported pain in back and buttocks. Completed physical therapy with some benefit from dry needling. Finding some benefit from gabapentin at bedtime. Underwent evaluation by Dr. Irena Mir and symptoms felt to be myofascial in origin with coccodynia. Lumbar and pelvic MRI without significant pathology to explain pain. Referred to Dr. Scott Arias and underwent three coccyx injections without much change. Completed PT and performing exercises at home. Advised to increase gabapentin to 100 mg in AM and midday and 300 mg at bedtime. 5. Left adnexal cyst. Incidental finding on pelvic MRI from 11/2019 showing large septated cystic mass in left adnexal area. Underwent pelvic TV ultrasound which confirmed cyst replacing left ovary, but did not notice any concerning features in cyst. Evaluated by Dr. Izaiah Rolon, and  normal. Repeat ultrasound in 2/2020 with decrease in size of cyst. Will need to be followed. 6. Endometrial polyp. Will need to schedule for removal after pandemic. Advised that will need to find new gynecologist in [facility] if not wishing to return to Dr. Izaiah Rolon. 7. Osteopenia. Last bone density scan 8/2019. Using femoral neck T-scores, calculated FRAX score estimates her 10 year risk of a major osteoporetic fracture at 9.4 % and hip fracture at 1.2 %, which are not an indication for biphosphonate treatment (>20% and >3%, respectively). Significantly decreased in her lumbar spine, approaching the level which would be considered osteoporosis and will need to follow. Continue calcium and Vitamin D. Encouraged exercise, particularly weight bearing activities. Orin Roldan 8.  Microscopic colitis. Diagnosed in 4/2018 during random colonic biopsies performed due to complaint of diarrhea. Weaned successfully off of budesonide treatment 1 year ago and has not had a recurrence since that time. Will continue to follow. 9. Peripheral neuropathy. Patient reports that it has been present for over 30 years. B12 and SPEP normal. HbA1c is elevated, but doubt that was responsible. May be related to prior alcohol abuse and small fiber neuropathy associated with excessive alcohol. She states that she does often have pain in her feet, but she has learned to cope with the discomfort and does not wish to receive medication. She also has a history of plantar fasciitis which may exacerbate the pain in her feet. Will continue to follow. 10. Smoking cessation. Discussed at length with the patient, including benefits of improved lung function as well as decreased risk of cardiovascular disease and cancer. Medication and non-pharmacologic options explored. Reviewed strategies to maximize success, including removing cigarettes from environment, addressing triggers, and stress management. She describes inability to tolerate Chantix and Wellbutrin due to bad dreams, but states that she was prescribed the SR formulation of Wellbutrin. Attempted trial of Wellbutrin IR but did not tolerate. Not wishing to attempt cessation currently due to stress of pandemic. 11. GERD. Currently without symptoms and no longer needing PPI. States that has learned to avoid foods that act as triggers. Follow. 12. Depression. She has a history of alcoholism and has attended AA for the last 31 years. She does use melatonin for insomnia, but does not have any active symptoms of depression. Will continue to follow. 13. Health maintenance. Already received influenza vaccine. She received 2/2 doses of Shingrix vaccine. Discussed Pneumovax 23 and advised to obtain. Received Tdap. Other immunizations up to date. Mammogram up to date.  Pap smear up to date. Colonoscopy due in 2023. Vitamin D level was normal and continuing maintenance dose supplement. Emphasized importance of lifestyle modifications, including diet, exercise, and weight loss. Smoking cessation as above. Does not meet criteria for low dose lung cancer screening due to 20 pack year smoking history. Patient understands recommendations and agrees with plan. Patient has now relocated to [place], [place] so no follow-up was scheduled. We discussed the expected course, resolution and complications of the diagnosis(es) in detail. Medication risks, benefits, costs, interactions, and alternatives were discussed as indicated. I advised her to contact the office if her condition worsens, changes or fails to improve as anticipated. She expressed understanding with the diagnosis(es) and plan. Cat Bueno is a 72 y.o. female who was evaluated by a video visit encounter for concerns as above. Patient identification was verified prior to start of the visit. A caregiver was present when appropriate. Due to this being a TeleHealth encounter (During [facility]- public health emergency), evaluation of the following organ systems was limited: Vitals/Constitutional/EENT/Resp/CV/GI//MS/Neuro/Skin/Heme-Lymph-Imm. Pursuant to the emergency declaration under the [facility], 1135 waiver authority and the [employer] and [employer] Act, this Virtual  Visit was conducted, with patient's (and/or legal guardian's) consent, to reduce the patient's risk of exposure to COVID-19 and provide necessary medical care. Services were provided through a video synchronous discussion virtually to substitute for in-person clinic visit. Patient was at home and provider was located in the office.       Susan Islas MD

## 2020-04-29 DIAGNOSIS — M15.9 GENERALIZED OSTEOARTHROSIS, INVOLVING MULTIPLE SITES: ICD-10-CM

## 2020-04-29 DIAGNOSIS — K21.9 GASTROESOPHAGEAL REFLUX DISEASE, ESOPHAGITIS PRESENCE NOT SPECIFIED: ICD-10-CM

## 2020-04-29 DIAGNOSIS — K52.832 LYMPHOCYTIC COLITIS: ICD-10-CM

## 2020-04-29 DIAGNOSIS — F10.21 PERSONAL HISTORY OF ALCOHOLISM (HCC): ICD-10-CM

## 2020-04-29 DIAGNOSIS — M79.2 CHRONIC PERIPHERAL NEUROPATHIC PAIN: ICD-10-CM

## 2020-04-29 DIAGNOSIS — G62.9 PERIPHERAL POLYNEUROPATHY: ICD-10-CM

## 2020-04-29 DIAGNOSIS — M54.42 CHRONIC BILATERAL LOW BACK PAIN WITH BILATERAL SCIATICA: ICD-10-CM

## 2020-04-29 DIAGNOSIS — M79.18 MYOFASCIAL PAIN SYNDROME OF LUMBAR SPINE: ICD-10-CM

## 2020-04-29 DIAGNOSIS — R73.03 PREDIABETES: ICD-10-CM

## 2020-04-29 DIAGNOSIS — F33.42 RECURRENT MAJOR DEPRESSIVE DISORDER, IN FULL REMISSION (HCC): ICD-10-CM

## 2020-04-29 DIAGNOSIS — M85.89 OSTEOPENIA OF MULTIPLE SITES: ICD-10-CM

## 2020-04-29 DIAGNOSIS — E78.5 HYPERLIPIDEMIA, UNSPECIFIED HYPERLIPIDEMIA TYPE: ICD-10-CM

## 2020-04-29 DIAGNOSIS — M53.3 SACROCOCCYGEAL DISORDERS, NOT ELSEWHERE CLASSIFIED: ICD-10-CM

## 2020-04-29 DIAGNOSIS — M54.41 CHRONIC BILATERAL LOW BACK PAIN WITH BILATERAL SCIATICA: ICD-10-CM

## 2020-04-29 DIAGNOSIS — G89.29 CHRONIC PERIPHERAL NEUROPATHIC PAIN: ICD-10-CM

## 2020-04-29 DIAGNOSIS — I10 ESSENTIAL HYPERTENSION: ICD-10-CM

## 2020-04-29 DIAGNOSIS — F17.200 CURRENT EVERY DAY SMOKER: ICD-10-CM

## 2020-04-29 DIAGNOSIS — E55.9 VITAMIN D DEFICIENCY: ICD-10-CM

## 2020-04-29 DIAGNOSIS — Z00.00 LABORATORY TESTS ORDERED AS PART OF A COMPLETE PHYSICAL EXAM (CPE): ICD-10-CM

## 2020-04-29 DIAGNOSIS — G89.29 CHRONIC BILATERAL LOW BACK PAIN WITH BILATERAL SCIATICA: ICD-10-CM

## 2020-05-06 DIAGNOSIS — I10 ESSENTIAL HYPERTENSION, BENIGN: ICD-10-CM

## 2020-05-06 RX ORDER — LISINOPRIL AND HYDROCHLOROTHIAZIDE 12.5; 2 MG/1; MG/1
1 TABLET ORAL DAILY
Qty: 90 TAB | Refills: 3 | Status: SHIPPED | OUTPATIENT
Start: 2020-05-06